# Patient Record
Sex: MALE | Race: WHITE | NOT HISPANIC OR LATINO | Employment: FULL TIME | URBAN - METROPOLITAN AREA
[De-identification: names, ages, dates, MRNs, and addresses within clinical notes are randomized per-mention and may not be internally consistent; named-entity substitution may affect disease eponyms.]

---

## 2017-09-15 ENCOUNTER — HOSPITAL ENCOUNTER (OUTPATIENT)
Dept: SLEEP MEDICINE | Facility: HOSPITAL | Age: 42
Discharge: HOME | End: 2017-09-15
Admitting: NURSE PRACTITIONER

## 2017-09-15 PROCEDURE — G0463 HOSPITAL OUTPT CLINIC VISIT: HCPCS

## 2017-09-19 NOTE — PROGRESS NOTES
[facility]  Telephone: [phone] / Fax: [phone] [place]  Telephone: [phone] / Fax: [phone] Azeb Villagran    PCP: Lei Gonsalez MD    Reason for visit: LEN f/u    Colby Duenas was last seen at [facility] sleep lab 1 year ago.  He uses the CPAP device and benefits from its use in terms of reduction of hypersomnia and snoring.  His mask fits well.  He reports worsening allergic rhinitis type symptoms that limiting CPAP use at times.  He is using Allegra and fluticasone nasal spray to alleviate the symptoms.  His ESS is 7 today.  He uses over the nose mask.  It fits well.  He denies excessive air leak.  No dry mouth.  He last changed his mask 3 months ago.  In the interval he was found to have paroxysmal supraventricular tachycardia and has followed with cardiology.    Social history, chewing tobacco, drinks 6+ of alcohol every week, one soda a day, no energy drinks.    Review of systems unremarkable    Current Medications:  Hycosamine  Clonazepam  Metoprolol  Lexapro  Allegra      Patient  has no past medical history on file.    I have reviewed the Past Medical History, Past Surgical History, Social History and Family History.             Vital Signs:  Height 68 inches, weight 184 pounds, BMI 27, blood pressure 113/73, heart rate 82           General: Alert. Cooperative. Well developed. No acute distress.           Throat: No oral lesions. No thrush. Moist mucous membranes.           Pharynx- Class III Mallampati airway, large tongue, no evidence of redundant lateral pharyngeal tissue             Head:  Normocephalic. Symmetrical. Atraumatic.              Nose: No septal deviation. No drainage.            Chest Wall -Normal shape. Symmetric expansion with respiration. No tenderness.             Neck:  Trachea midline.           Lungs:  Clear to auscultation bilaterally. No wheezes. No rhonchi. No rales. Respirations regular, even and unlabored.            Heart:  Regular  rhythm and normal rate. Normal S1 and S2. No murmur.     Abdomen:  Soft, non-tender and non-distended. Normal bowel sounds. No masses.  Extremities:  Moves all extremities well. No edema.    Psychiatric: Normal mood and affect.    Testing:  · Download 6/17/17-9/14/17 average use of 5 hours and 53 minutes on auto CPAP 5-15 centimeters with average pressure of 12.5, residual AHI 2.8    · HST 7/1/16-overall apnea hypopneas index is 5.7 indicating mild obstructive sleep apnea.  Snoring present for 59%. No significant desaturation with sleep.         Impression:  1. Obstructive sleep apnea.   2. Allergic rhinitis.  3. Recent PSVT      Plan:  Patient uses the CPAP device and benefits from its use in terms of reduction of hypersomnia and snoring. I asked him to increase compliance for full benefit.  As allergic rhinitis symptoms have increased, he was asked to see an allergist.  He may benefit from allergy shots.  Compliance with CPAP was reemphasized especially with underlying recent diagnosis of paroxysmal supraventricular tachycardia.    Weight loss will be strongly beneficial to reduce the severity of sleep-disordered breathing.  Caution during activities that require prolonged concentration is strongly advised if sleepiness returns. Changing of PAP supplies regularly is important for effective use. Patient needs to change cushion on the mask or plugs on nasal pillows along with disposable filters once every month and change mask frame, tubing, headgear and Velcro straps every 6 months at the minimum.       Follow up with Ilene YOUNGBLOOD in 6 months.    Thank you for allowing me to participate in your patient's care.      FORD Ramos  Dictating for Dr Pacheco  [facility]  Phone: [phone]      Part of this note may be an electronic transcription/translation of spoken language to printed text using the Dragon Dictation System.

## 2018-03-22 ENCOUNTER — APPOINTMENT (OUTPATIENT)
Dept: SLEEP MEDICINE | Facility: HOSPITAL | Age: 43
End: 2018-03-22

## 2021-04-02 ENCOUNTER — BULK ORDERING (OUTPATIENT)
Dept: CASE MANAGEMENT | Facility: OTHER | Age: 46
End: 2021-04-02

## 2021-04-02 DIAGNOSIS — Z23 IMMUNIZATION DUE: ICD-10-CM

## 2021-06-15 ENCOUNTER — OFFICE VISIT (OUTPATIENT)
Dept: FAMILY MEDICINE CLINIC | Facility: CLINIC | Age: 46
End: 2021-06-15

## 2021-06-15 VITALS
HEIGHT: 68 IN | HEART RATE: 62 BPM | WEIGHT: 189.6 LBS | TEMPERATURE: 97.5 F | BODY MASS INDEX: 28.73 KG/M2 | OXYGEN SATURATION: 98 % | RESPIRATION RATE: 16 BRPM | DIASTOLIC BLOOD PRESSURE: 68 MMHG | SYSTOLIC BLOOD PRESSURE: 110 MMHG

## 2021-06-15 DIAGNOSIS — G43.109 MIGRAINE WITH AURA AND WITHOUT STATUS MIGRAINOSUS, NOT INTRACTABLE: Primary | ICD-10-CM

## 2021-06-15 DIAGNOSIS — I70.1 RENAL ARTERY ATHEROSCLEROSIS (HCC): ICD-10-CM

## 2021-06-15 DIAGNOSIS — E55.9 VITAMIN D DEFICIENCY: ICD-10-CM

## 2021-06-15 DIAGNOSIS — J30.9 CHRONIC ALLERGIC RHINITIS: ICD-10-CM

## 2021-06-15 DIAGNOSIS — R55 VASOVAGAL SYNCOPE: ICD-10-CM

## 2021-06-15 DIAGNOSIS — F43.10 PTSD (POST-TRAUMATIC STRESS DISORDER): ICD-10-CM

## 2021-06-15 DIAGNOSIS — Z87.820 HISTORY OF MULTIPLE CONCUSSIONS: ICD-10-CM

## 2021-06-15 DIAGNOSIS — K58.0 IRRITABLE BOWEL SYNDROME WITH DIARRHEA: ICD-10-CM

## 2021-06-15 PROBLEM — G47.8 SLEEP PARALYSIS: Status: ACTIVE | Noted: 2020-10-08

## 2021-06-15 PROBLEM — Z45.09 ENCOUNTER FOR LOOP RECORDER AT END OF BATTERY LIFE: Status: ACTIVE | Noted: 2020-09-30

## 2021-06-15 PROBLEM — R19.7 DIARRHEA: Status: ACTIVE | Noted: 2019-01-11

## 2021-06-15 PROBLEM — G47.19 EXCESSIVE DAYTIME SLEEPINESS: Status: ACTIVE | Noted: 2020-10-08

## 2021-06-15 PROCEDURE — 99204 OFFICE O/P NEW MOD 45 MIN: CPT | Performed by: PHYSICIAN ASSISTANT

## 2021-06-15 RX ORDER — ELETRIPTAN HYDROBROMIDE 20 MG/1
TABLET, FILM COATED ORAL
COMMUNITY
Start: 2021-05-06 | End: 2021-06-15 | Stop reason: SDUPTHER

## 2021-06-15 RX ORDER — PANTOPRAZOLE SODIUM 20 MG/1
TABLET, DELAYED RELEASE ORAL
COMMUNITY
Start: 2021-03-14 | End: 2021-06-15 | Stop reason: SDUPTHER

## 2021-06-15 RX ORDER — ERENUMAB-AOOE 140 MG/ML
1 INJECTION, SOLUTION SUBCUTANEOUS
Qty: 1.12 ML | Refills: 11 | Status: SHIPPED | OUTPATIENT
Start: 2021-06-15 | End: 2022-06-28 | Stop reason: SDUPTHER

## 2021-06-15 RX ORDER — PANTOPRAZOLE SODIUM 20 MG/1
20 TABLET, DELAYED RELEASE ORAL DAILY
Qty: 90 TABLET | Refills: 3 | Status: SHIPPED | OUTPATIENT
Start: 2021-06-15 | End: 2022-07-13 | Stop reason: SDUPTHER

## 2021-06-15 RX ORDER — DICYCLOMINE HCL 20 MG
TABLET ORAL
COMMUNITY
Start: 2021-01-27 | End: 2021-07-13 | Stop reason: SDUPTHER

## 2021-06-15 RX ORDER — MONTELUKAST SODIUM 10 MG/1
TABLET ORAL
COMMUNITY
Start: 2021-06-06 | End: 2021-07-13 | Stop reason: SDUPTHER

## 2021-06-15 RX ORDER — METOPROLOL SUCCINATE 50 MG/1
50 TABLET, EXTENDED RELEASE ORAL DAILY
Qty: 90 TABLET | Refills: 1 | Status: SHIPPED | OUTPATIENT
Start: 2021-06-15 | End: 2021-07-13 | Stop reason: SDUPTHER

## 2021-06-15 RX ORDER — GABAPENTIN 300 MG/1
300 CAPSULE ORAL 3 TIMES DAILY
COMMUNITY
End: 2022-10-05 | Stop reason: SDDI

## 2021-06-15 RX ORDER — ELETRIPTAN HYDROBROMIDE 20 MG/1
20 TABLET, FILM COATED ORAL ONCE AS NEEDED
Qty: 15 TABLET | Refills: 11 | Status: SHIPPED | OUTPATIENT
Start: 2021-06-15 | End: 2022-07-13 | Stop reason: SDUPTHER

## 2021-06-15 NOTE — PROGRESS NOTES
Subjective   Colby Duenas is a 46 y.o. male.     History of Present Illness       Colby Duenas 46 y.o. male who presents today for a new patient appointment.    he has a history of   Patient Active Problem List   Diagnosis   • Change in blood platelet count   • Seizure disorder (CMS/HCC)   • Idiopathic insomnia   • Cutaneous eruption   • Acute post-traumatic neurosis   • LEN (obstructive sleep apnea)   • BP (high blood pressure)   • Fatigue   • Chronic mixed headache syndrome   • Allergic rhinitis   • Benign essential hypertension   • Diarrhea   • Encounter for loop recorder at end of battery life   • Excessive daytime sleepiness   • History of multiple concussions   • Irritable bowel syndrome with diarrhea   • Impingement syndrome of right shoulder   • S/P bilateral inguinal hernia repair   • Sleep paralysis   • Vasovagal syncope   • PTSD (post-traumatic stress disorder)   • Allergic rhinitis   • LEN on CPAP   • Migraine headache without aura   .  he is here to establish care I reviewed the PFSH recorded today by my MA/LPN staff.   he   He has been feeling tired.  Sees Dr Han for PTSD--psych  Use Cpap/Bipap every night  On beta blocker for hx sinus tachycardia; hx syncope  Loop recorder----released from cardio    IBS Dr. Chowdary---IBS---- fiber therapy and IB Guard---this helps  GERD is controlled on PPI Rx.    Last colonoscopy 3-1-19  Colby Duenas 46 y.o. male presents for evaluation of migraine. Symptoms began about several years ago. Generally, the headaches last about several hours and occur 2 or more times a week. The headaches are usually throbbing. The location of the headache pain is bilateral, occipital, temporal, parietal and retro-orbital. Recently, the headaches have been increasing in both severity and frequency. Work attendance or other daily activities are affected by the headaches. Precipitating factors include: odors, stress, weather changes and lack sleep. The headaches are  usually preceded by an aura consisting of a peculiar odor and visual scintillations. Associated symptoms include nausea, vomiting, headache pain worse with movement, photophobia and phonophobia. The patient denies depression. Home treatment has included NSAIDs, Excedrin, Topamax, Elavil with no improvement. Other history includes: Migraine headache. . Family history includes headaches of unknown type in sister.  Prior therapies tried include triptans with relief some help, but still symptoms.    Saw Dr Cordova in past  1-6-17  IMPRESSION:   1. Minimal intimal thickening of the bilateral carotid system with no   hemodynamically significant stenosis.   2. Unremarkable vertebral and subclavian arterial flow.    Renal artery doppler 12-30-16  PROCEDURE PERFORMED: DUPLEX US RENAL ARTERY     Conclusions: Renal-aorta ratios are 0.62  (right) and 0.62  (left) consistent with <60% bilateral renal artery stenosis. Resistive indices within normal limits bilaterally. Kidney lengths measure 10.3  cm (right) and 10.3  cm (left). Bilateral renal   veins are patent. Incidental finding, echogenicity in region of the gallbladder. Recommend clinical correlation and further imaging if warranted.No prior examination for comparison.     Echo stress test 12-23-16  Conclusions:   PDP 90560   Global left ventricular wall motion and contractility are within normal   limits.   The EF 4ch was calculated at 58%.   Normal left ventricular diastolic filling is observed.   Definity was used to optimize study.   Normal Sress Echo   Normal Stress EKG      The following portions of the patient's history were reviewed and updated as appropriate: allergies, current medications, past family history, past medical history, past social history, past surgical history and problem list.    Review of Systems   Constitutional: Negative for activity change, appetite change and unexpected weight change.   HENT: Negative for nosebleeds and trouble swallowing.    Eyes:  Negative for pain and visual disturbance.   Respiratory: Negative for chest tightness, shortness of breath and wheezing.    Cardiovascular: Negative for chest pain and palpitations.   Gastrointestinal: Negative for abdominal pain and blood in stool.   Endocrine: Negative.    Genitourinary: Negative for difficulty urinating and hematuria.   Musculoskeletal: Negative for joint swelling.   Skin: Negative for color change and rash.   Allergic/Immunologic: Negative.    Neurological: Negative for syncope and speech difficulty.   Hematological: Negative for adenopathy.   Psychiatric/Behavioral: Negative for agitation and confusion.   All other systems reviewed and are negative.      Objective   Physical Exam  Vitals and nursing note reviewed.   Constitutional:       General: He is not in acute distress.     Appearance: Normal appearance. He is well-developed. He is not diaphoretic.   HENT:      Head: Normocephalic.      Right Ear: External ear normal.      Left Ear: External ear normal.   Eyes:      General: No scleral icterus.     Conjunctiva/sclera: Conjunctivae normal.      Pupils: Pupils are equal, round, and reactive to light.   Neck:      Vascular: No carotid bruit.   Cardiovascular:      Rate and Rhythm: Normal rate and regular rhythm.      Heart sounds: Normal heart sounds. No murmur heard.     Pulmonary:      Effort: Pulmonary effort is normal.      Breath sounds: Normal breath sounds. No rales.   Abdominal:      Tenderness: There is no abdominal tenderness.   Musculoskeletal:         General: Normal range of motion.      Cervical back: Normal range of motion and neck supple.      Right lower leg: No edema.      Left lower leg: No edema.   Lymphadenopathy:      Cervical: No cervical adenopathy.   Skin:     General: Skin is warm and dry.      Coloration: Skin is not jaundiced.      Findings: No rash.   Neurological:      General: No focal deficit present.      Mental Status: He is alert and oriented to person,  place, and time.   Psychiatric:         Mood and Affect: Affect is not inappropriate.         Behavior: Behavior normal.         Thought Content: Thought content normal.         Judgment: Judgment normal.         Assessment/Plan   Diagnoses and all orders for this visit:    1. Migraine with aura and without status migrainosus, not intractable (Primary)  Comments:  more severe and frequent  Orders:  -     Comprehensive metabolic panel  -     Lipid panel  -     CBC and Differential  -     TSH  -     T3, Free  -     T4, Free  -     Vitamin B12  -     Folate  -     Vitamin D 25 Hydroxy  -     Hemoglobin A1c  -     MRI Brain With & Without Contrast; Future  -     Duplex Renal Artery - Bilateral Complete CAR; Future    2. Chronic allergic rhinitis  -     Comprehensive metabolic panel  -     Lipid panel  -     CBC and Differential  -     TSH  -     T3, Free  -     T4, Free  -     Vitamin B12  -     Folate  -     Vitamin D 25 Hydroxy  -     Hemoglobin A1c  -     MRI Brain With & Without Contrast; Future  -     Duplex Renal Artery - Bilateral Complete CAR; Future    3. PTSD (post-traumatic stress disorder)  -     Comprehensive metabolic panel  -     Lipid panel  -     CBC and Differential  -     TSH  -     T3, Free  -     T4, Free  -     Vitamin B12  -     Folate  -     Vitamin D 25 Hydroxy  -     Hemoglobin A1c  -     MRI Brain With & Without Contrast; Future  -     Duplex Renal Artery - Bilateral Complete CAR; Future    4. History of multiple concussions  -     Comprehensive metabolic panel  -     Lipid panel  -     CBC and Differential  -     TSH  -     T3, Free  -     T4, Free  -     Vitamin B12  -     Folate  -     Vitamin D 25 Hydroxy  -     Hemoglobin A1c  -     MRI Brain With & Without Contrast; Future  -     Duplex Renal Artery - Bilateral Complete CAR; Future    5. Vasovagal syncope  -     Comprehensive metabolic panel  -     Lipid panel  -     CBC and Differential  -     TSH  -     T3, Free  -     T4, Free  -      Vitamin B12  -     Folate  -     Vitamin D 25 Hydroxy  -     Hemoglobin A1c  -     MRI Brain With & Without Contrast; Future  -     Duplex Renal Artery - Bilateral Complete CAR; Future    6. Irritable bowel syndrome with diarrhea  -     Comprehensive metabolic panel  -     Lipid panel  -     CBC and Differential  -     TSH  -     T3, Free  -     T4, Free  -     Vitamin B12  -     Folate  -     Vitamin D 25 Hydroxy  -     Hemoglobin A1c  -     MRI Brain With & Without Contrast; Future  -     Duplex Renal Artery - Bilateral Complete CAR; Future    7. Vitamin D deficiency  -     Comprehensive metabolic panel  -     Lipid panel  -     CBC and Differential  -     TSH  -     T3, Free  -     T4, Free  -     Vitamin B12  -     Folate  -     Vitamin D 25 Hydroxy  -     Hemoglobin A1c  -     MRI Brain With & Without Contrast; Future  -     Duplex Renal Artery - Bilateral Complete CAR; Future    8. Renal artery atherosclerosis (CMS/HCC)  -     Comprehensive metabolic panel  -     Lipid panel  -     CBC and Differential  -     TSH  -     T3, Free  -     T4, Free  -     Vitamin B12  -     Folate  -     Vitamin D 25 Hydroxy  -     Hemoglobin A1c  -     MRI Brain With & Without Contrast; Future  -     Duplex Renal Artery - Bilateral Complete CAR; Future    Other orders  -     pantoprazole (PROTONIX) 20 MG EC tablet; Take 1 tablet by mouth Daily. For GERD  Dispense: 90 tablet; Refill: 3  -     metoprolol succinate XL (TOPROL-XL) 50 MG 24 hr tablet; Take 1 tablet by mouth Daily. For heart rate  Dispense: 90 tablet; Refill: 1  -     eletriptan (RELPAX) 20 MG tablet; Take 1 tablet by mouth 1 (One) Time As Needed for Migraine for up to 1 dose. may repeat in 2 hours if necessary  Dispense: 15 tablet; Refill: 11  -     Erenumab-aooe (Aimovig) 140 MG/ML prefilled syringe; Inject 1 mL under the skin into the appropriate area as directed Every 30 (Thirty) Days. For migraines  Dispense: 1.12 mL; Refill: 11      Failed several meds for  migrain--restart Aimovig and keep Relpax PRN migraines  Talk to DR Han about sleep  Update brain imaging---hx concussions; migraines worse again; restart Aimovig and keep Relpax PRN  Ok Bentyl PRN IBS and PPI for GERD  Use Cpap/Bipap every night  Renal doppler---2016 plaque?

## 2021-06-16 LAB
25(OH)D3+25(OH)D2 SERPL-MCNC: 21.4 NG/ML (ref 30–100)
ALBUMIN SERPL-MCNC: 5.3 G/DL (ref 3.5–5.2)
ALBUMIN/GLOB SERPL: 2 G/DL
ALP SERPL-CCNC: 87 U/L (ref 39–117)
ALT SERPL-CCNC: 25 U/L (ref 1–41)
AST SERPL-CCNC: 20 U/L (ref 1–40)
BASOPHILS # BLD AUTO: ABNORMAL 10*3/UL
BILIRUB SERPL-MCNC: 0.7 MG/DL (ref 0–1.2)
BUN SERPL-MCNC: 16 MG/DL (ref 6–20)
BUN/CREAT SERPL: 15.8 (ref 7–25)
CALCIUM SERPL-MCNC: 10.2 MG/DL (ref 8.6–10.5)
CHLORIDE SERPL-SCNC: 100 MMOL/L (ref 98–107)
CHOLEST SERPL-MCNC: 216 MG/DL (ref 0–200)
CO2 SERPL-SCNC: 28.8 MMOL/L (ref 22–29)
CREAT SERPL-MCNC: 1.01 MG/DL (ref 0.76–1.27)
DIFFERENTIAL COMMENT: NORMAL
EOSINOPHIL # BLD AUTO: ABNORMAL 10*3/UL
EOSINOPHIL # BLD MANUAL: 0.14 10*3/MM3 (ref 0–0.4)
EOSINOPHIL NFR BLD AUTO: ABNORMAL %
EOSINOPHIL NFR BLD MANUAL: 2 % (ref 0.3–6.2)
ERYTHROCYTE [DISTWIDTH] IN BLOOD BY AUTOMATED COUNT: 12.8 % (ref 12.3–15.4)
FOLATE SERPL-MCNC: 9.5 NG/ML (ref 4.78–24.2)
GLOBULIN SER CALC-MCNC: 2.7 GM/DL
GLUCOSE SERPL-MCNC: 87 MG/DL (ref 65–99)
HBA1C MFR BLD: 5.2 % (ref 4.8–5.6)
HCT VFR BLD AUTO: 48.8 % (ref 37.5–51)
HDLC SERPL-MCNC: 58 MG/DL (ref 40–60)
HGB BLD-MCNC: 16.2 G/DL (ref 13–17.7)
LDLC SERPL CALC-MCNC: 137 MG/DL (ref 0–100)
LYMPHOCYTES # BLD AUTO: ABNORMAL 10*3/UL
LYMPHOCYTES # BLD MANUAL: 2.93 10*3/MM3 (ref 0.7–3.1)
LYMPHOCYTES NFR BLD AUTO: ABNORMAL %
LYMPHOCYTES NFR BLD MANUAL: 41.4 % (ref 19.6–45.3)
MCH RBC QN AUTO: 30.5 PG (ref 26.6–33)
MCHC RBC AUTO-ENTMCNC: 33.2 G/DL (ref 31.5–35.7)
MCV RBC AUTO: 91.7 FL (ref 79–97)
MONOCYTES # BLD MANUAL: 0.36 10*3/MM3 (ref 0.1–0.9)
MONOCYTES NFR BLD AUTO: ABNORMAL %
MONOCYTES NFR BLD MANUAL: 5.1 % (ref 5–12)
NEUTROPHILS # BLD MANUAL: 3.64 10*3/MM3 (ref 1.7–7)
NEUTROPHILS NFR BLD AUTO: ABNORMAL %
NEUTROPHILS NFR BLD MANUAL: 51.5 % (ref 42.7–76)
PLATELET # BLD AUTO: 120 10*3/MM3 (ref 140–450)
PLATELET BLD QL SMEAR: NORMAL
POTASSIUM SERPL-SCNC: 4.8 MMOL/L (ref 3.5–5.2)
PROT SERPL-MCNC: 8 G/DL (ref 6–8.5)
RBC # BLD AUTO: 5.32 10*6/MM3 (ref 4.14–5.8)
RBC MORPH BLD: NORMAL
SODIUM SERPL-SCNC: 140 MMOL/L (ref 136–145)
T3FREE SERPL-MCNC: 3.1 PG/ML (ref 2–4.4)
T4 FREE SERPL-MCNC: 1.17 NG/DL (ref 0.93–1.7)
TRIGL SERPL-MCNC: 120 MG/DL (ref 0–150)
TSH SERPL DL<=0.005 MIU/L-ACNC: 2.04 UIU/ML (ref 0.27–4.2)
VIT B12 SERPL-MCNC: 846 PG/ML (ref 211–946)
VLDLC SERPL CALC-MCNC: 21 MG/DL (ref 5–40)
WBC # BLD AUTO: 7.07 10*3/MM3 (ref 3.4–10.8)

## 2021-07-13 ENCOUNTER — OFFICE VISIT (OUTPATIENT)
Dept: FAMILY MEDICINE CLINIC | Facility: CLINIC | Age: 46
End: 2021-07-13

## 2021-07-13 VITALS
OXYGEN SATURATION: 96 % | TEMPERATURE: 98.5 F | WEIGHT: 191 LBS | RESPIRATION RATE: 16 BRPM | DIASTOLIC BLOOD PRESSURE: 82 MMHG | HEIGHT: 68 IN | BODY MASS INDEX: 28.95 KG/M2 | SYSTOLIC BLOOD PRESSURE: 120 MMHG | HEART RATE: 65 BPM

## 2021-07-13 DIAGNOSIS — K21.9 GASTROESOPHAGEAL REFLUX DISEASE WITHOUT ESOPHAGITIS: ICD-10-CM

## 2021-07-13 DIAGNOSIS — D69.6 THROMBOCYTOPENIA (HCC): ICD-10-CM

## 2021-07-13 DIAGNOSIS — I47.1 PSVT (PAROXYSMAL SUPRAVENTRICULAR TACHYCARDIA) (HCC): ICD-10-CM

## 2021-07-13 DIAGNOSIS — G43.019 INTRACTABLE MIGRAINE WITHOUT AURA AND WITHOUT STATUS MIGRAINOSUS: Primary | ICD-10-CM

## 2021-07-13 DIAGNOSIS — Z99.89 OSA ON CPAP: ICD-10-CM

## 2021-07-13 DIAGNOSIS — E78.2 HYPERLIPIDEMIA, MIXED: ICD-10-CM

## 2021-07-13 DIAGNOSIS — G47.33 OSA ON CPAP: ICD-10-CM

## 2021-07-13 PROCEDURE — 99214 OFFICE O/P EST MOD 30 MIN: CPT | Performed by: PHYSICIAN ASSISTANT

## 2021-07-13 RX ORDER — METOPROLOL SUCCINATE 50 MG/1
50 TABLET, EXTENDED RELEASE ORAL DAILY
Qty: 90 TABLET | Refills: 3 | Status: SHIPPED | OUTPATIENT
Start: 2021-07-13 | End: 2022-07-13 | Stop reason: SDUPTHER

## 2021-07-13 RX ORDER — DICYCLOMINE HCL 20 MG
20 TABLET ORAL 3 TIMES DAILY PRN
Qty: 90 TABLET | Refills: 11 | Status: SHIPPED | OUTPATIENT
Start: 2021-07-13 | End: 2022-07-13 | Stop reason: SDUPTHER

## 2021-07-13 RX ORDER — MONTELUKAST SODIUM 10 MG/1
10 TABLET ORAL NIGHTLY
Qty: 90 TABLET | Refills: 3 | Status: SHIPPED | OUTPATIENT
Start: 2021-07-13 | End: 2022-07-13 | Stop reason: SDUPTHER

## 2021-07-13 NOTE — PATIENT INSTRUCTIONS
Fat and Cholesterol Restricted Eating Plan  Eating a diet that limits fat and cholesterol may help lower your risk for heart disease and other conditions. Your body needs fat and cholesterol for basic functions, but eating too much of these things can be harmful to your health.  Your health care provider may order lab tests to check your blood fat (lipid) and cholesterol levels. This helps your health care provider understand your risk for certain conditions and whether you need to make diet changes. Work with your health care provider or dietitian to make an eating plan that is right for you.  Your plan includes:  · Limit your fat intake to ______% or less of your total calories a day.  · Limit your saturated fat intake to ______% or less of your total calories a day.  · Limit the amount of cholesterol in your diet to less than _________mg a day.  · Eat ___________ g of fiber a day.  What are tips for following this plan?  General guidelines    · If you are overweight, work with your health care provider to lose weight safely. Losing just 5-10% of your body weight can improve your overall health and help prevent diseases such as diabetes and heart disease.  · Avoid:  ? Foods with added sugar.  ? Fried foods.  ? Foods that contain partially hydrogenated oils, including stick margarine, some tub margarines, cookies, crackers, and other baked goods.  · Limit alcohol intake to no more than 1 drink a day for nonpregnant women and 2 drinks a day for men. One drink equals 12 oz of beer, 5 oz of wine, or 1½ oz of hard liquor.  Reading food labels  · Check food labels for:  ? Trans fats, partially hydrogenated oils, or high amounts of saturated fat. Avoid foods that contain saturated fat and trans fat.  ? The amount of cholesterol in each serving. Try to eat no more than 200 mg of cholesterol each day.  ? The amount of fiber in each serving. Try to eat at least 20-30 g of fiber each day.  · Choose foods with healthy fats,  "such as:  ? Monounsaturated and polyunsaturated fats. These include olive and canola oil, flaxseeds, walnuts, almonds, and seeds.  ? Omega-3 fats. These are found in foods such as salmon, mackerel, sardines, tuna, flaxseed oil, and ground flaxseeds.  · Choose grain products that have whole grains. Look for the word \"whole\" as the first word in the ingredient list.  Cooking  · Cook foods using methods other than frying. Baking, boiling, grilling, and broiling are some healthy options.  · Eat more home-cooked food and less restaurant, buffet, and fast food.  · Avoid cooking using saturated fats.  ? Animal sources of saturated fats include meats, butter, and cream.  ? Plant sources of saturated fats include palm oil, palm kernel oil, and coconut oil.  Meal planning    · At meals, imagine dividing your plate into fourths:  ? Fill one-half of your plate with vegetables and green salads.  ? Fill one-fourth of your plate with whole grains.  ? Fill one-fourth of your plate with lean protein foods.  · Eat fish that is high in omega-3 fats at least two times a week.  · Eat more foods that contain fiber, such as whole grains, beans, apples, broccoli, carrots, peas, and barley. These foods help promote healthy cholesterol levels in the blood.  Recommended foods  Grains  · Whole grains, such as whole wheat or whole grain breads, crackers, cereals, and pasta. Unsweetened oatmeal, bulgur, barley, quinoa, or brown rice. Corn or whole wheat flour tortillas.  Vegetables  · Fresh or frozen vegetables (raw, steamed, roasted, or grilled). Green salads.  Fruits  · All fresh, canned (in natural juice), or frozen fruits.  Meats and other protein foods  · Ground beef (85% or leaner), grass-fed beef, or beef trimmed of fat. Skinless chicken or turkey. Ground chicken or turkey. Pork trimmed of fat. All fish and seafood. Egg whites. Dried beans, peas, or lentils. Unsalted nuts or seeds. Unsalted canned beans. Natural nut butters without added " sugar and oil.  Dairy  · Low-fat or nonfat dairy products, such as skim or 1% milk, 2% or reduced-fat cheeses, low-fat and fat-free ricotta or cottage cheese, or plain low-fat and nonfat yogurt.  Fats and oils  · Tub margarine without trans fats. Light or reduced-fat mayonnaise and salad dressings. Avocado. Olive, canola, sesame, or safflower oils.  The items listed above may not be a complete list of recommended foods or beverages. Contact your dietitian for more options.  Foods to avoid  Grains  · White bread. White pasta. White rice. Cornbread. Bagels, pastries, and croissants. Crackers and snack foods that contain trans fat and hydrogenated oils.  Vegetables  · Vegetables cooked in cheese, cream, or butter sauce. Fried vegetables.  Fruits  · Canned fruit in heavy syrup. Fruit in cream or butter sauce. Fried fruit.  Meats and other protein foods  · Fatty cuts of meat. Ribs, chicken wings, suero, sausage, bologna, salami, chitterlings, fatback, hot dogs, bratwurst, and packaged lunch meats. Liver and organ meats. Whole eggs and egg yolks. Chicken and turkey with skin. Fried meat.  Dairy  · Whole or 2% milk, cream, half-and-half, and cream cheese. Whole milk cheeses. Whole-fat or sweetened yogurt. Full-fat cheeses. Nondairy creamers and whipped toppings. Processed cheese, cheese spreads, and cheese curds.  Beverages  · Alcohol. Sugar-sweetened drinks such as sodas, lemonade, and fruit drinks.  Fats and oils  · Butter, stick margarine, lard, shortening, ghee, or suero fat. Coconut, palm kernel, and palm oils.  Sweets and desserts  · Corn syrup, sugars, honey, and molasses. Candy. Jam and jelly. Syrup. Sweetened cereals. Cookies, pies, cakes, donuts, muffins, and ice cream.  The items listed above may not be a complete list of foods and beverages to avoid. Contact your dietitian for more information.  Summary  · Your body needs fat and cholesterol for basic functions. However, eating too much of these things can be  harmful to your health.  · Work with your health care provider and dietitian to follow a diet low in fat and cholesterol. Doing this may help lower your risk for heart disease and other conditions.  · Choose healthy fats, such as monounsaturated and polyunsaturated fats, and foods high in omega-3 fatty acids.  · Eat fiber-rich foods, such as whole grains, beans, peas, fruits, and vegetables.  · Limit or avoid alcohol, fried foods, and foods high in saturated fats, partially hydrogenated oils, and sugar.  This information is not intended to replace advice given to you by your health care provider. Make sure you discuss any questions you have with your health care provider.  Document Revised: 11/30/2018 Document Reviewed: 09/04/2018  [employer]vier Patient Education © 2021 [employer]

## 2021-07-13 NOTE — PROGRESS NOTES
"Subjective   Colby Duenas is a 46 y.o. male.     History of Present Illness    Since the last visit, he has overall felt tired.  He has GERD controlled on PPI Rx, Hyperlipidemia and working on this with diet and exercise, Seasonal allergies and doing well on their medication , Vitamin D deficiency and will update labs for continued management, Migraine headaches and responding well to PRN triptan Rx and Migraine headaches and well controlled on suppression medication.  he has been compliant with current medications have reviewed them.  The patient denies medication side effects.  Will refill medications. /82   Pulse 65   Temp 98.5 °F (36.9 °C)   Resp 16   Ht 172.7 cm (67.99\")   Wt 86.6 kg (191 lb)   SpO2 96%   BMI 29.05 kg/m²   GERD Rx is daily---failed Zantac;  Bentyl for IBS works great PRN.  2013 AHA (American Heart Association) Cholesterol Risk Ratio Score Goal is <=7.5% and your score is:  2.15%  Exercise daily--physical job    Results for orders placed or performed in visit on 06/15/21   Comprehensive metabolic panel    Specimen: Blood   Result Value Ref Range    Glucose 87 65 - 99 mg/dL    BUN 16 6 - 20 mg/dL    Creatinine 1.01 0.76 - 1.27 mg/dL    eGFR Non African Am 79 >60 mL/min/1.73    eGFR African Am 96 >60 mL/min/1.73    BUN/Creatinine Ratio 15.8 7.0 - 25.0    Sodium 140 136 - 145 mmol/L    Potassium 4.8 3.5 - 5.2 mmol/L    Chloride 100 98 - 107 mmol/L    Total CO2 28.8 22.0 - 29.0 mmol/L    Calcium 10.2 8.6 - 10.5 mg/dL    Total Protein 8.0 6.0 - 8.5 g/dL    Albumin 5.30 (H) 3.50 - 5.20 g/dL    Globulin 2.7 gm/dL    A/G Ratio 2.0 g/dL    Total Bilirubin 0.7 0.0 - 1.2 mg/dL    Alkaline Phosphatase 87 39 - 117 U/L    AST (SGOT) 20 1 - 40 U/L    ALT (SGPT) 25 1 - 41 U/L   Lipid panel    Specimen: Blood   Result Value Ref Range    Total Cholesterol 216 (H) 0 - 200 mg/dL    Triglycerides 120 0 - 150 mg/dL    HDL Cholesterol 58 40 - 60 mg/dL    VLDL Cholesterol Jose Armando 21 5 - 40 mg/dL    LDL " Chol Calc (NIH) 137 (H) 0 - 100 mg/dL   TSH    Specimen: Blood   Result Value Ref Range    TSH 2.040 0.270 - 4.200 uIU/mL   T3, Free    Specimen: Blood   Result Value Ref Range    T3, Free 3.1 2.0 - 4.4 pg/mL   T4, Free    Specimen: Blood   Result Value Ref Range    Free T4 1.17 0.93 - 1.70 ng/dL   Vitamin B12    Specimen: Blood   Result Value Ref Range    Vitamin B-12 846 211 - 946 pg/mL   Folate    Specimen: Blood   Result Value Ref Range    Folate 9.50 4.78 - 24.20 ng/mL   Vitamin D 25 Hydroxy    Specimen: Blood   Result Value Ref Range    25 Hydroxy, Vitamin D 21.4 (L) 30.0 - 100.0 ng/mL   Hemoglobin A1c    Specimen: Blood   Result Value Ref Range    Hemoglobin A1C 5.20 4.80 - 5.60 %   Manual Differential   Result Value Ref Range    Neutrophil Rel % 51.5 42.7 - 76.0 %    Lymphocyte Rel % 41.4 19.6 - 45.3 %    Monocyte Rel % 5.1 5.0 - 12.0 %    Eosinophil Rel % 2.0 0.3 - 6.2 %    Neutrophils Absolute 3.64 1.70 - 7.00 10*3/mm3    Lymphocytes Absolute 2.93 0.70 - 3.10 10*3/mm3    Monocytes Absolute 0.36 0.10 - 0.90 10*3/mm3    Eosinophil Abs 0.14 0.00 - 0.40 10*3/mm3    Differential Comment Comment     Comment Comment     Plt Comment Comment    CBC and Differential    Specimen: Blood   Result Value Ref Range    WBC 7.07 3.40 - 10.80 10*3/mm3    RBC 5.32 4.14 - 5.80 10*6/mm3    Hemoglobin 16.2 13.0 - 17.7 g/dL    Hematocrit 48.8 37.5 - 51.0 %    MCV 91.7 79.0 - 97.0 fL    MCH 30.5 26.6 - 33.0 pg    MCHC 33.2 31.5 - 35.7 g/dL    RDW 12.8 12.3 - 15.4 %    Platelets 120 (L) 140 - 450 10*3/mm3    Neutrophil Rel % CANCELED     Lymphocyte Rel % CANCELED     Monocyte Rel % CANCELED     Eosinophil Rel % CANCELED     Lymphocytes Absolute CANCELED     Eosinophils Absolute CANCELED     Basophils Absolute CANCELED      Prior notes: ---  On beta blocker for hx sinus tachycardia; hx syncope  Loop recorder----released from cardio-----DR Moreno   Echo stress test 12-23-16  Conclusions:   Global left ventricular wall motion and  contractility are within normal   limits.   The EF 4ch was calculated at 58%.   Normal left ventricular diastolic filling is observed.   Definity was used to optimize study.   Normal Sress Echo   Normal Stress EKG      Has been to neurology in the past and saw Dr. Cordova for migraines and hx concussions.  Last visit he reported that he was not taking his prevention medicine due to insurance.  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  Also noted prior renal artery ultrasound stating less than 60% bilateral renal artery stenosis and ordered follow-up Doppler and this was completed in normal.    Sees Dr Han for psych    The following portions of the patient's history were reviewed and updated as appropriate: allergies, current medications, past family history, past medical history, past social history, past surgical history and problem list.    Review of Systems   Constitutional: Positive for appetite change and fatigue. Negative for activity change and unexpected weight change.   HENT: Negative for nosebleeds and trouble swallowing.    Eyes: Negative for pain and visual disturbance.   Respiratory: Negative for chest tightness, shortness of breath and wheezing.    Cardiovascular: Negative for chest pain and palpitations.   Gastrointestinal: Negative for abdominal pain and blood in stool.   Endocrine: Negative.    Genitourinary: Negative for difficulty urinating and hematuria.   Musculoskeletal: Positive for arthralgias. Negative for joint swelling.   Skin: Negative for color change and rash.   Allergic/Immunologic: Positive for environmental allergies.   Neurological: Negative for syncope and speech difficulty.   Hematological: Negative for adenopathy.   Psychiatric/Behavioral: Positive  for decreased concentration and sleep disturbance. Negative for agitation and confusion. The patient is nervous/anxious.    All other systems reviewed and are negative.      Objective   Physical Exam  Vitals and nursing note reviewed.   Constitutional:       General: He is not in acute distress.     Appearance: Normal appearance. He is well-developed. He is not diaphoretic.   HENT:      Head: Normocephalic.   Eyes:      General: No scleral icterus.     Conjunctiva/sclera: Conjunctivae normal.      Pupils: Pupils are equal, round, and reactive to light.   Cardiovascular:      Rate and Rhythm: Normal rate and regular rhythm.      Pulses: Normal pulses.      Heart sounds: Normal heart sounds. No murmur heard.     Pulmonary:      Effort: Pulmonary effort is normal.      Breath sounds: Normal breath sounds. No rales.   Musculoskeletal:         General: Normal range of motion.      Cervical back: Normal range of motion and neck supple.   Skin:     General: Skin is warm and dry.      Findings: No rash.   Neurological:      Mental Status: He is alert and oriented to person, place, and time. Mental status is at baseline.   Psychiatric:         Mood and Affect: Affect is not inappropriate.         Behavior: Behavior normal.         Thought Content: Thought content normal.         Judgment: Judgment normal.         Assessment/Plan   Diagnoses and all orders for this visit:    1. Intractable migraine without aura and without status migrainosus (Primary)    2. LEN on CPAP    3. PSVT (paroxysmal supraventricular tachycardia) (CMS/HCC)    4. Hyperlipidemia, mixed    5. Thrombocytopenia (CMS/HCC)  -     Platelet Ct On Citrated Bld    6. Gastroesophageal reflux disease without esophagitis    Other orders  -     montelukast (SINGULAIR) 10 MG tablet; Take 1 tablet by mouth Every Night.  Dispense: 90 tablet; Refill: 3  -     metoprolol succinate XL (TOPROL-XL) 50 MG 24 hr tablet; Take 1 tablet by mouth Daily. For heart rate  Dispense:  90 tablet; Refill: 3  -     dicyclomine (BENTYL) 20 MG tablet; Take 1 tablet by mouth 3 (Three) Times a Day As Needed (IBS).  Dispense: 90 tablet; Refill: 11      MRI brain as above---no further work up, renal doppler was normal ---no further work up   Aimovig working--suppresses migraines  Sees Dr Han, psych  Cont beta blocker for heart rate control  Need citrated platelet count---abn lab  Failed H2 blocker  Bentyl for IBS works great PRN.  Plan, Colby Duenas, was seen today.  he was seen for GERD and will continue on PPI medication, Hyperlipidemia and will work on this with diet and exercise, Hypothyroidism and under the care of Endocrinologist, Vitamin D deficiency and will update labs , Migraine headaches and will continue with their PRN triptan and Migraine headaches and well controlled on their suppressive medication.

## 2021-09-09 ENCOUNTER — OFFICE VISIT (OUTPATIENT)
Dept: FAMILY MEDICINE CLINIC | Facility: CLINIC | Age: 46
End: 2021-09-09

## 2021-09-09 VITALS
DIASTOLIC BLOOD PRESSURE: 62 MMHG | SYSTOLIC BLOOD PRESSURE: 115 MMHG | RESPIRATION RATE: 16 BRPM | TEMPERATURE: 97.5 F | WEIGHT: 194 LBS | BODY MASS INDEX: 29.4 KG/M2 | HEIGHT: 68 IN | OXYGEN SATURATION: 96 % | HEART RATE: 66 BPM

## 2021-09-09 DIAGNOSIS — M25.511 ACUTE PAIN OF RIGHT SHOULDER: Primary | ICD-10-CM

## 2021-09-09 PROCEDURE — 99213 OFFICE O/P EST LOW 20 MIN: CPT | Performed by: PHYSICIAN ASSISTANT

## 2021-09-09 RX ORDER — LORAZEPAM 1 MG/1
TABLET ORAL
Qty: 2 TABLET | Refills: 0 | Status: SHIPPED | OUTPATIENT
Start: 2021-09-09 | End: 2021-10-05 | Stop reason: SDUPTHER

## 2021-09-09 NOTE — PATIENT INSTRUCTIONS
Shoulder Pain  Many things can cause shoulder pain, including:  · An injury to the shoulder.  · Overuse of the shoulder.  · Arthritis.  The source of the pain can be:  · Inflammation.  · An injury to the shoulder joint.  · An injury to a tendon, ligament, or bone.  Follow these instructions at home:  Pay attention to changes in your symptoms. Let your health care provider know about them. Follow these instructions to relieve your pain.  If you have a sling:  · Wear the sling as told by your health care provider. Remove it only as told by your health care provider.  · Loosen the sling if your fingers tingle, become numb, or turn cold and blue.  · Keep the sling clean.  · If the sling is not waterproof:  ? Do not let it get wet. Remove it to shower or bathe.  · Move your arm as little as possible, but keep your hand moving to prevent swelling.  Managing pain, stiffness, and swelling    · If directed, put ice on the painful area:  ? Put ice in a plastic bag.  ? Place a towel between your skin and the bag.  ? Leave the ice on for 20 minutes, 2-3 times per day. Stop applying ice if it does not help with the pain.  · Squeeze a soft ball or a foam pad as much as possible. This helps to keep the shoulder from swelling. It also helps to strengthen the arm.    General instructions  · Take over-the-counter and prescription medicines only as told by your health care provider.  · Keep all follow-up visits as told by your health care provider. This is important.  Contact a health care provider if:  · Your pain gets worse.  · Your pain is not relieved with medicines.  · New pain develops in your arm, hand, or fingers.  Get help right away if:  · Your arm, hand, or fingers:  ? Tingle.  ? Become numb.  ? Become swollen.  ? Become painful.  ? Turn white or blue.  Summary  · Shoulder pain can be caused by an injury, overuse, or arthritis.  · Pay attention to changes in your symptoms. Let your health care provider know about  them.  · This condition may be treated with a sling, ice, and pain medicines.  · Contact your health care provider if the pain gets worse or new pain develops. Get help right away if your arm, hand, or fingers tingle or become numb, swollen, or painful.  · Keep all follow-up visits as told by your health care provider. This is important.  This information is not intended to replace advice given to you by your health care provider. Make sure you discuss any questions you have with your health care provider.  Document Revised: 07/02/2019 Document Reviewed: 07/02/2019  Elsevier Patient Education © 2021 [employer]

## 2021-09-09 NOTE — PROGRESS NOTES
"Subjective   Colby Duenas is a 46 y.o. male.     History of Present Illness   Colby Duenas 46 y.o. male /62 (BP Location: Left arm, Patient Position: Sitting, Cuff Size: Adult)   Pulse 66   Temp 97.5 °F (36.4 °C)   Resp 16   Ht 172.7 cm (67.99\")   Wt 88 kg (194 lb)   SpO2 96%   BMI 29.50 kg/m²  who presents today for right shoulder pain.  he has a history of   Patient Active Problem List   Diagnosis   • Change in blood platelet count   • Idiopathic insomnia   • Cutaneous eruption   • Acute post-traumatic neurosis   • LEN (obstructive sleep apnea)   • BP (high blood pressure)   • Fatigue   • Chronic mixed headache syndrome   • Allergic rhinitis   • Diarrhea   • Encounter for loop recorder at end of battery life   • Excessive daytime sleepiness   • History of multiple concussions   • Irritable bowel syndrome with diarrhea   • Impingement syndrome of right shoulder   • S/P bilateral inguinal hernia repair   • Sleep paralysis   • Vasovagal syncope   • PTSD (post-traumatic stress disorder)   • Allergic rhinitis   • LEN on CPAP   • Migraine headache without aura   • PSVT (paroxysmal supraventricular tachycardia) (CMS/HCC)   • Hyperlipidemia, mixed   • Gastroesophageal reflux disease without esophagitis   .    Did see ortho mos ago and had injection----only helped 10 days.  Next step is MRI.  He does not need precert from me.  I will print order today.  I do recommend right shoulder MRI--ortho did Xray a few mos ago.     Pain with all shoulder ROM; pain to lay on right shoulder  I see he called yesterday about his shoulder pain.  And noted prior surgery 7 years ago due to bone spurs and chronic impingement syndrome.  Reported experiencing discomfort mobility issues now and mirrors the symptoms he had before    Sees psych--on Gabapentin; will get N[facility] and do premed one time for MRI---can get today--wife works there.  The following portions of the patient's history were reviewed and updated as " appropriate: allergies, current medications, past family history, past medical history, past social history, past surgical history and problem list.    Review of Systems   Constitutional: Negative for activity change, appetite change and unexpected weight change.   HENT: Negative for nosebleeds and trouble swallowing.    Eyes: Negative for pain and visual disturbance.   Respiratory: Negative for chest tightness, shortness of breath and wheezing.    Cardiovascular: Negative for chest pain and palpitations.   Gastrointestinal: Negative for abdominal pain and blood in stool.   Endocrine: Negative.    Genitourinary: Negative for difficulty urinating and hematuria.   Musculoskeletal: Positive for arthralgias. Negative for joint swelling.   Skin: Negative for color change and rash.   Allergic/Immunologic: Negative.    Neurological: Negative for syncope and speech difficulty.   Hematological: Negative for adenopathy.   Psychiatric/Behavioral: Negative for agitation and confusion.   All other systems reviewed and are negative.      Objective   Physical Exam  Vitals and nursing note reviewed.   Constitutional:       General: He is not in acute distress.     Appearance: Normal appearance. He is well-developed. He is not diaphoretic.   HENT:      Head: Normocephalic.   Eyes:      Conjunctiva/sclera: Conjunctivae normal.      Pupils: Pupils are equal, round, and reactive to light.   Cardiovascular:      Rate and Rhythm: Normal rate.   Pulmonary:      Effort: Pulmonary effort is normal.   Musculoskeletal:         General: Tenderness present. Normal range of motion.      Cervical back: Normal range of motion and neck supple.      Comments: Sore to touch right shoulder and all ROM pain   Skin:     General: Skin is warm and dry.      Findings: No rash.   Neurological:      Mental Status: He is alert and oriented to person, place, and time.   Psychiatric:         Mood and Affect: Mood normal. Affect is not inappropriate.          Behavior: Behavior normal.         Thought Content: Thought content normal.         Judgment: Judgment normal.         Assessment/Plan   Diagnoses and all orders for this visit:    1. Acute pain of right shoulder (Primary)  -     MRI Shoulder Right Without Contrast; Future        Refer to ortho  Needs MRI right shoulder--pre med Ativan           Answers for HPI/ROS submitted by the patient on 9/9/2021  What is the primary reason for your visit?: Other  Please describe your symptoms.: Right shoulder pain,  mobility issues  Have you had these symptoms before?: Yes  How long have you been having these symptoms?: Greater than 2 weeks  Please describe any probable cause for these symptoms. : Chronic impingement syndrome,  had surgery approximately 8 years ago to clean out joint and remove bone spurs

## 2021-09-21 ENCOUNTER — OFFICE VISIT (OUTPATIENT)
Dept: ORTHOPEDIC SURGERY | Facility: CLINIC | Age: 46
End: 2021-09-21

## 2021-09-21 VITALS — WEIGHT: 194 LBS | TEMPERATURE: 98 F | BODY MASS INDEX: 29.4 KG/M2 | HEIGHT: 68 IN

## 2021-09-21 DIAGNOSIS — M79.601 PAIN OF RIGHT UPPER EXTREMITY: ICD-10-CM

## 2021-09-21 DIAGNOSIS — M75.41 SHOULDER IMPINGEMENT, RIGHT: Primary | ICD-10-CM

## 2021-09-21 PROCEDURE — 20610 DRAIN/INJ JOINT/BURSA W/O US: CPT | Performed by: NURSE PRACTITIONER

## 2021-09-21 PROCEDURE — 99204 OFFICE O/P NEW MOD 45 MIN: CPT | Performed by: NURSE PRACTITIONER

## 2021-09-21 PROCEDURE — 73030 X-RAY EXAM OF SHOULDER: CPT | Performed by: NURSE PRACTITIONER

## 2021-09-21 RX ORDER — LIDOCAINE HYDROCHLORIDE 20 MG/ML
5 INJECTION, SOLUTION EPIDURAL; INFILTRATION; INTRACAUDAL; PERINEURAL
Status: COMPLETED | OUTPATIENT
Start: 2021-09-21 | End: 2021-09-21

## 2021-09-21 RX ORDER — METHYLPREDNISOLONE ACETATE 80 MG/ML
80 INJECTION, SUSPENSION INTRA-ARTICULAR; INTRALESIONAL; INTRAMUSCULAR; SOFT TISSUE
Status: COMPLETED | OUTPATIENT
Start: 2021-09-21 | End: 2021-09-21

## 2021-09-21 RX ADMIN — METHYLPREDNISOLONE ACETATE 80 MG: 80 INJECTION, SUSPENSION INTRA-ARTICULAR; INTRALESIONAL; INTRAMUSCULAR; SOFT TISSUE at 13:38

## 2021-09-21 RX ADMIN — LIDOCAINE HYDROCHLORIDE 5 ML: 20 INJECTION, SOLUTION EPIDURAL; INFILTRATION; INTRACAUDAL; PERINEURAL at 13:38

## 2021-09-21 NOTE — PROGRESS NOTES
[facility]  New Patient      Patient Name: Colby Duenas  : 1975  Primary Care Physician: Mary Lopez PA-C        Chief Complaint:  R shoulder pain.     HPI:   Colby Duenas is a 46 y.o. year old who presents today for evaluation of R shoulder pain. Patient previously had right shoulder arthroscopy and debridement 7 years ago with a physician at [facility]. He did well after that, but noted return of his pain in about January of this year. He had no particular event or injury which restarted the pain. He works as an  and his job is quite demanding on his shoulders. He went back for an evaluation in February of this year, he reports they gave him an injection which did absolutely nothing for his pain symptoms. He has also used a dedicated regimen of anti-inflammatories, his physical therapy exercises, gentle stretching and strengthening all without alleviation. He comes today with new x-rays and an MRI for further evaluation and treatment.    In discussion with the patient he also endorses some pain and numbness and tingling which radiates down into the arm, the numbness and tingling gets better with position changes but he feels persistent weakness in the upper extremity and particularly with his  strength in the right hand. No dedicated neck pain but his shoulder pain is deep within the joint and also some posteriorly around the trapezius muscles.    He is right-hand dominant, pain is severe at 5 out of 10. It is waking him up at night. Pain is throbbing and grinding as well as some associated clicking and popping.      Past Medical/Surgical, Social and Family History:  I have reviewed and/or updated pertinent history as noted in the medical record including:  Past Medical History:   Diagnosis Date   • Allergic    • Anxiety    • Dislocation, shoulder    • Fracture, clavicle    • Knee swelling 10/94   • Low back strain    • Migraines    •  Periarthritis of shoulder 2014   • PSVT (paroxysmal supraventricular tachycardia) (CMS/HCC)    • Stress fracture    • Tear of meniscus of knee    • Tendinitis of knee      Past Surgical History:   Procedure Laterality Date   • CARDIAC CATHETERIZATION     • HAND SURGERY     • HERNIA REPAIR     • INGUINAL HERNIA REPAIR     • SHOULDER ACROMIOCLAVICULAR JOINT REPAIR     • SHOULDER SURGERY  2014   • TRIGGER POINT INJECTION  Multiple     Social History     Occupational History   • Not on file   Tobacco Use   • Smoking status: Former Smoker     Packs/day: 2.00     Years: 15.00     Pack years: 30.00     Types: Cigarettes     Start date: 7/10/1990     Quit date: 2017     Years since quittin.3   • Smokeless tobacco: Former User     Types: Chew     Quit date: 2014   Vaping Use   • Vaping Use: Never used   Substance and Sexual Activity   • Alcohol use: Not Currently     Alcohol/week: 0.0 standard drinks     Comment: Sober since 2018   • Drug use: Never   • Sexual activity: Yes     Partners: Female     Birth control/protection: None      Social History     Social History Narrative   • Not on file     Family History   Problem Relation Age of Onset   • Hypertension Mother    • Arthritis Mother    • Hypertension Father        Allergies:   Allergies   Allergen Reactions   • Oxycodone-Acetaminophen Hives   • Oxycodone-Acetaminophen        Medications:   Home Medications:  Current Outpatient Medications on File Prior to Visit   Medication Sig   • dicyclomine (BENTYL) 20 MG tablet Take 1 tablet by mouth 3 (Three) Times a Day As Needed (IBS).   • eletriptan (RELPAX) 20 MG tablet Take 1 tablet by mouth 1 (One) Time As Needed for Migraine for up to 1 dose. may repeat in 2 hours if necessary   • Erenumab-aooe (Aimovig) 140 MG/ML prefilled syringe Inject 1 mL under the skin into the appropriate area as directed Every 30 (Thirty) Days. For migraines   • FIBER COMPLETE tablet Take  by mouth.   •  fluticasone (FLONASE) 50 MCG/ACT nasal spray 1 spray into the nostril(s) as directed by provider Daily.   • gabapentin (NEURONTIN) 300 MG capsule Take 300 mg by mouth 3 (Three) Times a Day.   • LORazepam (ATIVAN) 1 MG tablet One PO one hour prior to medical test and may repeat X1 if needed at time of procedure   • melatonin 3 MG tablet Take  by mouth Daily.   • metoprolol succinate XL (TOPROL-XL) 50 MG 24 hr tablet Take 1 tablet by mouth Daily. For heart rate   • montelukast (SINGULAIR) 10 MG tablet Take 1 tablet by mouth Every Night.   • ondansetron ODT (ZOFRAN-ODT) 4 MG disintegrating tablet DISSOLVE ONE TABLET BY MOUTH EVERY 8 HOURS AS NEEDED FOR NAUSEA   • pantoprazole (PROTONIX) 20 MG EC tablet Take 1 tablet by mouth Daily. For GERD     No current facility-administered medications on file prior to visit.         ROS:  14 point review of systems was negative except as listed in the HPI and activity change, sleep disturbance, headache and environmental allergies.    Physical Exam:   46 y.o. male  Body mass index is 29.5 kg/m²., 88 kg (194 lb)  Vitals:    09/21/21 1019   Temp: 98 °F (36.7 °C)     General: Alert, cooperative, appears well and in no observable distress. Appears stated age and BMI as listed above.  HEENT: Normocephalic, atraumatic on external visual inspection.  CV: No significant peripheral edema.  Respiratory: Normal respiratory effort.  Skin: Warm & well perfused; appropriate skin turgor.  Psych: Appropriate mood & affect.  Neuro: Gross sensation and motor intact in affected extremity/extremities.  Vascular: Peripheral pulses palpable in affected extremity/extremities.     MSK Exam:  RIGHT Shoulder  No obvious deformity or wounds, atrophy: absent, tenderness:AC joint, lateral acromial, AROM:Flexion: 180; ER: 60; IR: lower thoracic spine, Cuff Strength: ER 4+; IR 5; Supraspinatus 4+, uncomfortable with isometric testing, +impingement signs, empty can supraspinatus test positive, external  rotation/infraspinatus test : positive and [place]'s Test: positive    LEFT Shoulder  No deformity or wounds.  No tenderness to palpation.  Full, painless AROM.  Cuff Strength 5/5 with ER, IR & Supraspinatus testing.  Negative provocative testing.    Cervical Spine:  No obvious deformity.  limitation of flexion: mild, limitation of rotation to the right: mild, limitation of rotation to the left: not present, limitation of extension: mild  Motor strength preserved in bilateral upper extremities. I do appreciate very slight discrepancy in his  strength. Mild weakness with shoulder abduction compared with the L. Bicep, tricep, wrist flex/ext WNL, digit abduction WNL.  SILT distally.      Radiology:    The following X-rays were ordered/reviewed today to evaluate the patient's symptoms: Shoulder: AP, Scapular Y and Axillary Lateral of right shoulder(s) show Mild degenerative changes in the shoulder particularly the AC joint without acute bony pathology.. There are no prior films available for direct comparison.     Patient also has an MRI which was performed on September 9, 2021 at [facility].  I independently reviewed those images and agree with the findings as noted by the radiologist. Patient has minimal AC joint arthrosis with supraspinatus contact.  There is mild tendinopathy in the supraspinatus and infraspinatus without a tear.  The remainder of the shoulder structures are unremarkable.    Procedure:   See Procedure Note: The potential risks and benefits of performing a diagnostic and therapeutic injection were discussed with the patient prior to procedure. Risks include, but are not limited to infection, swelling, transient increase in pain, bleeding, bruising. Patient was advised that injections are a diagnostic and therapeutic tool meaning they may not alleviate symptoms at all, or may only provide partial or temporary relief.     Large Joint Arthrocentesis: R subacromial bursa  Date/Time: 9/21/2021 1:38  PM  Consent given by: patient  Site marked: site marked  Timeout: Immediately prior to procedure a time out was called to verify the correct patient, procedure, equipment, support staff and site/side marked as required   Supporting Documentation  Indications: pain   Procedure Details  Location: shoulder - R subacromial bursa  Preparation: Patient was prepped and draped in the usual sterile fashion  Needle size: 22 G  Approach: posterior  Medications administered: 80 mg methylPREDNISolone acetate 80 MG/ML; 5 mL lidocaine PF 2% 2 %  Patient tolerance: patient tolerated the procedure well with no immediate complications            Misc. Data/Labs: N/A    Assessment & Plan:    This is a 46-year-old male with recurrent right shoulder pain.  He was quite discouraged after a cortisone injection in February which gave him no relief.  He does have some tendinitis in the rotator cuff but no appreciable tear.  He does have some concerning symptoms of numbness and tingling in the right upper extremity which are concerning for neck pathology and or nerve impingement.    I discussed the case with Dr. Caruso and we agree that it would be best to try and exhaust conservative measures first.  I would like to start with repeating the cortisone injection as a diagnostic and therapeutic tool.  Patient was agreeable to that today and tolerated the procedure well.  If again he gets no relief and that I think we would need to work-up the chain and evaluate his neck.  His shoulder MRI results do not really show anything significant enough to cause him the amount of pain that he is currently having.  Additionally if we work the neck up and are unable to find anything clinically significant might consider repeating the MRI with an arthrogram, certainly if there is something there we will get him to Dr. Caruso to consider repeat arthroscopy.    Discussed this plan thoroughly with the patient and he was in agreement.  He will continue to use  his over-the-counter medications and other conservative treatments.  We will follow up with him in 2 weeks.    Injection provided in the office today. Tolerated well with no immediate complications. Injection precautions discussed with the patient., Patient encouraged to call with questions or concerns prior to follow up. , Patient instructed on appropriate use of NSAIDs and signs/symptoms of adverse reactions. Patient advised to stop medications and notify the office in the event of any noted side effects.  and Patient instructed on use of ICE therapy PRN for pain and swelling.      ICD-10-CM ICD-9-CM   1. Shoulder impingement, right  M75.41 726.2   2. Pain of right upper extremity  M79.601 729.5     Imaging Results (Most Recent)     Procedure Component Value Units Date/Time    XR Shoulder 2+ View Right [[id number]] Resulted: 09/21/21 1018     Updated: 09/21/21 1018    Impression:      Ordering physician's impression is located in the Encounter Note dated 09/21/21. X-ray performed in the DR room.          No orders of the defined types were placed in this encounter.    Orders Placed This Encounter   Procedures   • XR Shoulder 2+ View Right     Return in about 2 weeks (around 10/5/2021) for Recheck.    FORD Cancino      Dictated Utilizing Dragon Dictation

## 2021-10-05 ENCOUNTER — OFFICE VISIT (OUTPATIENT)
Dept: ORTHOPEDIC SURGERY | Facility: CLINIC | Age: 46
End: 2021-10-05

## 2021-10-05 VITALS — TEMPERATURE: 97.3 F | BODY MASS INDEX: 29.4 KG/M2 | HEIGHT: 68 IN | WEIGHT: 194 LBS

## 2021-10-05 DIAGNOSIS — M25.511 ACUTE PAIN OF RIGHT SHOULDER: ICD-10-CM

## 2021-10-05 DIAGNOSIS — S43.431D TEAR OF RIGHT GLENOID LABRUM, SUBSEQUENT ENCOUNTER: Primary | ICD-10-CM

## 2021-10-05 DIAGNOSIS — M79.601 PAIN OF RIGHT UPPER EXTREMITY: ICD-10-CM

## 2021-10-05 PROCEDURE — 99214 OFFICE O/P EST MOD 30 MIN: CPT | Performed by: NURSE PRACTITIONER

## 2021-10-05 RX ORDER — LORAZEPAM 1 MG/1
TABLET ORAL
Qty: 2 TABLET | Refills: 0 | Status: SHIPPED | OUTPATIENT
Start: 2021-10-05 | End: 2021-11-17

## 2021-10-05 NOTE — PROGRESS NOTES
[facility]              Follow Up      Patient Name: Colby Duenas  : 1975  Primary Care Physician: Mary Lopez PA-C        Chief Complaint: R Shoulder pain.    HPI:   Colby Duenas is a 46 y.o. year old who presents today for evaluation of R shoulder pain.  Patient previously had right shoulder arthroscopy and debridement 7 years ago in the same shoulder and did well after that but his pain returned in January of this year.  There was no particular event or injury pain gradually came back and he really felt it was quite similar to his symptoms before.  He had new x-rays and an MRI on 2021 without contrast which suggested minimal AC joint arthrosis with some supraspinatus contact.  Additionally he had some tendinopathy but no tear.    The severity of his symptoms did not quite reflect what was revealed on the MRI so we elected to try 1 additional cortisone injection he had previously had one in February with no relief.  The injection we gave him 2 weeks ago did temporarily help his symptoms for about half a week and then returned right back to the baseline.    He has also done a strict regimen of anti-inflammatories, physical therapy, stretching strengthening all without alleviation.  He works as an  and does have some symptoms especially while he is working and driving for long distances.  Pain is mostly in the anterior shoulder some radiates posteriorly, he does have some sensation of weakness in the arm with activities but reports is really not so much numbness and tingling or electric shock type pain.      Past Medical/Surgical, Social and Family History:  I have reviewed and/or updated pertinent history as noted in the medical record including:  Past Medical History:   Diagnosis Date   • Allergic    • Anxiety    • Dislocation, shoulder    • Fracture, clavicle    • Knee swelling 10/94   • Low back strain    • Migraines    • Periarthritis of shoulder  2014   • PSVT (paroxysmal supraventricular tachycardia) ([facility])    • Stress fracture    • Tear of meniscus of knee    • Tendinitis of knee      Past Surgical History:   Procedure Laterality Date   • CARDIAC CATHETERIZATION     • HAND SURGERY     • HERNIA REPAIR     • INGUINAL HERNIA REPAIR     • SHOULDER ACROMIOCLAVICULAR JOINT REPAIR     • SHOULDER SURGERY  2014   • TRIGGER POINT INJECTION  Multiple     Social History     Occupational History   • Not on file   Tobacco Use   • Smoking status: Former Smoker     Packs/day: 2.00     Years: 15.00     Pack years: 30.00     Types: Cigarettes     Start date: 7/10/1990     Quit date: 2017     Years since quittin.3   • Smokeless tobacco: Former User     Types: Chew     Quit date: 2014   Vaping Use   • Vaping Use: Never used   Substance and Sexual Activity   • Alcohol use: Not Currently     Alcohol/week: 0.0 standard drinks     Comment: Sober since 2018   • Drug use: Never   • Sexual activity: Yes     Partners: Female     Birth control/protection: None      Social History     Social History Narrative   • Not on file     Family History   Problem Relation Age of Onset   • Hypertension Mother    • Arthritis Mother    • Hypertension Father        Allergies:   Allergies   Allergen Reactions   • Oxycodone-Acetaminophen Hives   • Oxycodone-Acetaminophen        Medications:   Home Medications:  Current Outpatient Medications on File Prior to Visit   Medication Sig   • dicyclomine (BENTYL) 20 MG tablet Take 1 tablet by mouth 3 (Three) Times a Day As Needed (IBS).   • Erenumab-aooe (Aimovig) 140 MG/ML prefilled syringe Inject 1 mL under the skin into the appropriate area as directed Every 30 (Thirty) Days. For migraines   • FIBER COMPLETE tablet Take  by mouth.   • gabapentin (NEURONTIN) 300 MG capsule Take 300 mg by mouth 3 (Three) Times a Day.   • melatonin 3 MG tablet Take  by mouth Daily.   • metoprolol succinate XL (TOPROL-XL) 50 MG 24 hr tablet  Take 1 tablet by mouth Daily. For heart rate   • montelukast (SINGULAIR) 10 MG tablet Take 1 tablet by mouth Every Night.   • pantoprazole (PROTONIX) 20 MG EC tablet Take 1 tablet by mouth Daily. For GERD   • eletriptan (RELPAX) 20 MG tablet Take 1 tablet by mouth 1 (One) Time As Needed for Migraine for up to 1 dose. may repeat in 2 hours if necessary   • fluticasone (FLONASE) 50 MCG/ACT nasal spray 1 spray into the nostril(s) as directed by provider Daily.   • ondansetron ODT (ZOFRAN-ODT) 4 MG disintegrating tablet DISSOLVE ONE TABLET BY MOUTH EVERY 8 HOURS AS NEEDED FOR NAUSEA   • [DISCONTINUED] LORazepam (ATIVAN) 1 MG tablet One PO one hour prior to medical test and may repeat X1 if needed at time of procedure     No current facility-administered medications on file prior to visit.         ROS:  ROS negative except as listed in the HPI.    Physical Exam:   46 y.o. male  Body mass index is 29.5 kg/m²., 88 kg (194 lb)  Vitals:    10/05/21 0834   Temp: 97.3 °F (36.3 °C)     General: Alert, cooperative, appears well and in no observable distress. Appears stated age and BMI as listed above.  HEENT: Normocephalic, atraumatic on external visual inspection.  CV: No significant peripheral edema.  Respiratory: Normal respiratory effort.  Skin: Warm & well perfused; appropriate skin turgor.  Psych: Appropriate mood & affect.  Neuro: Gross sensation and motor intact in affected extremity/extremities.  Vascular: Peripheral pulses palpable in affected extremity/extremities.     MSK Exam:  RIGHT Shoulder  No obvious deformity or wounds, atrophy: absent, tenderness:AC joint, lateral acromial, AROM:Flexion: 180; ER: 60; IR: lower thoracic spine, Cuff Strength: ER 4+; IR 5; Supraspinatus 4+, uncomfortable with isometric testing,+ labral signs, mild apprehension, +impingement signs, empty can supraspinatus test positive, external rotation/infraspinatus test : positive and [place]'s Test: positive     LEFT Shoulder  No deformity or  wounds.  No tenderness to palpation.  Full, painless AROM.  Cuff Strength 5/5 with ER, IR & Supraspinatus testing.  Negative provocative testing.        Radiology:    No new images were needed at the visit today.      Prior images were reviewed today, these were taken at the last visit 2 weeks ago and show only mild degenerative changes at the AC joint.    Again as stated the MRI performed on September 9, 2021 without contrast at [facility] shows minimal AC joint arthrosis with supraspinatus contact, mild tendinopathy without tear in the supraspinatus and infraspinatus.  No other acute or chronic pathology was appreciated.    Procedure:   N/A      Misc. Data/Labs: N/A    Assessment & Plan:    This is a 46-year-old male with recurrent right shoulder pain.  We elected to try a cortisone injection at the last visit and he did get some temporary relief when previously he had not gotten any relief back in February.  This does make me suspicious that he has some pathology in the shoulder itself.  After discussion at our last appointment with  we considered repeating his MRI with an arthrogram depending on how he responded.  Certainly some of his symptoms sound like they could be labral, and his exam does support that.      Recommend to proceed with the MRI arthrogram of the right shoulder.  Once we get the results I will review those and discuss them with the patient.  Ultimately would like to have him follow-up with Dr. Caruso for additional recommendations and treatments.     I did refill the Ativan for him to use during the procedure.  I gave him to 1 mg tablets with instructions for use.  I checked the [facility] report and it is appropriate to prescribe that for acute claustrophobia related to a medical procedure.    Patient encouraged to call with questions or concerns prior to follow up.       ICD-10-CM ICD-9-CM   1. Tear of right glenoid labrum, subsequent encounter  S43.431D V58.89     840.8   2. Pain  of right upper extremity  M79.601 729.5   3. Acute pain of right shoulder  M25.511 719.41     Imaging Results (Most Recent)     None        New Medications Ordered This Visit   Medications   • LORazepam (ATIVAN) 1 MG tablet     Sig: One PO one hour prior to medical test and may repeat X1 if needed at time of procedure     Dispense:  2 tablet     Refill:  0     Orders Placed This Encounter   Procedures   • MRI Shoulder Right Arthrogram   • FL Contrast Injection CT / MRI     Return for Appointment after MRI is complete..    Navi Mcdonald, FORD      Dictated Utilizing Dragon Dictation

## 2021-10-20 ENCOUNTER — TELEPHONE (OUTPATIENT)
Dept: ORTHOPEDIC SURGERY | Facility: CLINIC | Age: 46
End: 2021-10-20

## 2021-10-25 ENCOUNTER — OFFICE VISIT (OUTPATIENT)
Dept: ORTHOPEDIC SURGERY | Facility: CLINIC | Age: 46
End: 2021-10-25

## 2021-10-25 VITALS — HEIGHT: 68 IN | WEIGHT: 190 LBS | TEMPERATURE: 97.8 F | BODY MASS INDEX: 28.79 KG/M2

## 2021-10-25 DIAGNOSIS — M75.41 IMPINGEMENT SYNDROME OF RIGHT SHOULDER: Primary | ICD-10-CM

## 2021-10-25 PROCEDURE — 99214 OFFICE O/P EST MOD 30 MIN: CPT | Performed by: ORTHOPAEDIC SURGERY

## 2021-10-25 RX ORDER — CEFAZOLIN SODIUM 2 G/100ML
2 INJECTION, SOLUTION INTRAVENOUS ONCE
Status: CANCELLED | OUTPATIENT
Start: 2021-11-24 | End: 2021-10-25

## 2021-10-25 NOTE — PROGRESS NOTES
Patient: Colby Duenas  YOB: 1975  Date of Service: 10/25/2021    Chief Complaints:   Chief Complaint   Patient presents with   • Right Shoulder - Establish Care, Pain     Right shoulder pain  Subjective:    History of Present Illness: Pt is seen in the office today with complaints of Colby Duenas is a 46 y.o. year old who presents today for evaluation of R shoulder pain. Patient previously had right shoulder arthroscopy and debridement 7 years ago with a physician at [facility]. He did well after that, but noted return of his pain in about January of this year. He had no particular event or injury which restarted the pain. He works as an  and his job is quite demanding on his shoulders. He went back for an evaluation in February of this year, he reports they gave him an injection which did absolutely nothing for his pain symptoms. He has also used a dedicated regimen of anti-inflammatories, his physical therapy exercises, gentle stretching and strengthening all without alleviation. He comes today with new x-rays and an MRI for further evaluation and treatment.     In discussion with the patient he also endorses some pain and numbness and tingling which radiates down into the arm, the numbness and tingling gets better with position changes but he feels persistent weakness in the upper extremity and particularly with his  strength in the right hand. No dedicated neck pain but his shoulder pain is deep within the joint and also some posteriorly around the trapezius muscles.    He has done conservative management, physical therapy injections with no significant lasting relief but temporary relief.  He does have an MR arthrogram which shows some tendinosis of the rotator cuff no obvious tear and obvious labral tear.  He is quite symptomatic and limit his activities and wishes to proceed the next step.  His symptoms are moderate intermittent aching worse with activity  somewhat better with rest his past medical history is well listed below reviewed by me     Chief Complaint   Patient presents with   • Right Shoulder - Establish Care, Pain   .          Allergies:   Allergies   Allergen Reactions   • Oxycodone-Acetaminophen Hives   • Oxycodone-Acetaminophen        Medications:   Home Medications:  Current Outpatient Medications on File Prior to Visit   Medication Sig   • dicyclomine (BENTYL) 20 MG tablet Take 1 tablet by mouth 3 (Three) Times a Day As Needed (IBS).   • eletriptan (RELPAX) 20 MG tablet Take 1 tablet by mouth 1 (One) Time As Needed for Migraine for up to 1 dose. may repeat in 2 hours if necessary   • Erenumab-aooe (Aimovig) 140 MG/ML prefilled syringe Inject 1 mL under the skin into the appropriate area as directed Every 30 (Thirty) Days. For migraines   • FIBER COMPLETE tablet Take  by mouth.   • fluticasone (FLONASE) 50 MCG/ACT nasal spray 1 spray into the nostril(s) as directed by provider Daily.   • gabapentin (NEURONTIN) 300 MG capsule Take 300 mg by mouth 3 (Three) Times a Day.   • LORazepam (ATIVAN) 1 MG tablet One PO one hour prior to medical test and may repeat X1 if needed at time of procedure   • melatonin 3 MG tablet Take  by mouth Daily.   • metoprolol succinate XL (TOPROL-XL) 50 MG 24 hr tablet Take 1 tablet by mouth Daily. For heart rate   • montelukast (SINGULAIR) 10 MG tablet Take 1 tablet by mouth Every Night.   • ondansetron ODT (ZOFRAN-ODT) 4 MG disintegrating tablet DISSOLVE ONE TABLET BY MOUTH EVERY 8 HOURS AS NEEDED FOR NAUSEA   • pantoprazole (PROTONIX) 20 MG EC tablet Take 1 tablet by mouth Daily. For GERD     No current facility-administered medications on file prior to visit.     Current Medications:  Scheduled Meds:  Continuous Infusions:No current facility-administered medications for this visit.    PRN Meds:.    I have reviewed the patient's medical history in detail and updated the computerized patient record.  Review and summarization  of old records include:    Past Medical History:   Diagnosis Date   • Allergic    • Anxiety    • Dislocation, shoulder    • Fracture, clavicle    • Knee swelling 10/94   • Low back strain    • Migraines    • Periarthritis of shoulder 2014   • PSVT (paroxysmal supraventricular tachycardia) (HCC)    • Stress fracture    • Tear of meniscus of knee    • Tendinitis of knee         Past Surgical History:   Procedure Laterality Date   • CARDIAC CATHETERIZATION     • HAND SURGERY     • HERNIA REPAIR     • INGUINAL HERNIA REPAIR     • SHOULDER ACROMIOCLAVICULAR JOINT REPAIR     • SHOULDER SURGERY  2014   • TRIGGER POINT INJECTION  Multiple        Social History     Occupational History   • Not on file   Tobacco Use   • Smoking status: Former Smoker     Packs/day: 2.00     Years: 15.00     Pack years: 30.00     Types: Cigarettes     Start date: 7/10/1990     Quit date: 2017     Years since quittin.4   • Smokeless tobacco: Former User     Types: Chew     Quit date: 2014   Vaping Use   • Vaping Use: Never used   Substance and Sexual Activity   • Alcohol use: Not Currently     Alcohol/week: 0.0 standard drinks     Comment: Sober since 2018   • Drug use: Never   • Sexual activity: Yes     Partners: Female     Birth control/protection: None      Social History     Social History Narrative   • Not on file        Family History   Problem Relation Age of Onset   • Hypertension Mother    • Arthritis Mother    • Hypertension Father        ROS: 14 point review of systems was performed and was negative except for documented findings in HPI and today's encounter.     Allergies:   Allergies   Allergen Reactions   • Oxycodone-Acetaminophen Hives   • Oxycodone-Acetaminophen      Constitutional:  Denies fever, shaking or chills   Eyes:  Denies change in visual acuity   HENT:  Denies nasal congestion or sore throat   Respiratory:  Denies cough or shortness of breath   Cardiovascular:  Denies  "chest pain or severe LE edema   GI:  Denies abdominal pain, nausea, vomiting, bloody stools or diarrhea   Musculoskeletal:  Numbness, tingling, or loss of motor function only as noted above in history of present illness.  : Denies painful urination or hematuria  Integument:  Denies rash, lesion or ulceration   Neurologic:  Denies headache or focal weakness  Endocrine:  Denies lymphadenopathy  Psych:  Denies confusion or change in mental status   Hem:  Denies active bleeding      Physical Exam: 46 y.o. male  Wt Readings from Last 3 Encounters:   10/25/21 86.2 kg (190 lb)   10/05/21 88 kg (194 lb)   09/21/21 88 kg (194 lb)       Body mass index is 28.89 kg/m².  Facility age limit for growth percentiles is 20 years.  Vitals:    10/25/21 1523   Temp: 97.8 °F (36.6 °C)     Vital signs reviewed.   General Appearance:    Alert, cooperative, in no acute distress                    Ortho exam  Physical exam of the right shoulder reveals no overlying skin changes no lymphedema no lymphadenopathy.  Patient has active flexion 180 with mild symptoms abduction is similar external rotation is to 50 and internal rotation to the upper lumbar spine with mild symptoms.  Patient has good rotator cuff strength 4+ over 5 with isometric strength testing with pain.  Patient has a positive impingement and a positive Moreno sign.  Patient has good cervical range of motion which is full and asymptomatic no radicular symptoms.  Patient has a normal elbow exam.  Good distal pulses are present  Patient has pain with overhead activity and a positive Neer sign and a positive empty can sign , a positive drop arm and a definitive painful arc    Physical Exam: 46 y.o. male  General Appearance:    Alert, cooperative, in no acute distress                      Vitals:    10/25/21 1523   Temp: 97.8 °F (36.6 °C)   Weight: 86.2 kg (190 lb)   Height: 172.7 cm (68\")   PainSc:   4        Head:    Normocephalic, without obvious abnormality, atraumatic "   Eyes:            conjunctivae and sclerae normal, no pallor, corneas clear,    Ears:    Ears appear intact with no abnormalities noted   Throat:   No oral lesions, no thrush, oral mucosa moist   Neck:   No adenopathy, supple, trachea midline, no thyromegaly,    Back:     No kyphosis present, no scoliosis present, no skin lesions,      erythema or scars, no tenderness to percussion or                   palpation,   range of motion normal   Lungs:     Clear to auscultation,respirations regular, even and                  unlabored    Heart:    Regular rhythm and normal rate               Chest Wall:    No abnormalities observed               Pulses:   Pulses palpable and equal bilaterally   Skin:   No bleeding, bruising or rash   Lymph nodes:   No palpable adenopathy   Neurologic:   Appears neurologic intact       I did review x-rays taken by [facility] he has a mild degenerative change acromioclavicular joint otherwise no significant abnormality also reviewed his MRI which shows marked tendinosis of the rotator cuff no obvious tear have reviewed all of the above and agree with     .time    Assessment: Persistent right shoulder pain despite conservative measures he is failed all the conservative management he is an MRI which shows some tendinosis.  Plan is to proceed with arthroscopy would also discussed with him possibility of decompression.  I discussed in detail other possibilities that could be found at time of surgery including a more significant rotator cuff injury requiring rotator cuff repair.  Also talked about the potential for labral injury requiring debridement or repair as well as addressing the biceps tendon with debridement tenotomy or tenodesis.  I discussed all these in detail with the patient he understands and agrees to proceed we also talked about risk benefits and alternatives  The patient voiced understanding of the risks, benefits, and alternative forms of treatment that were discussed and the  patient consents to proceed with the above listed surgery.  All risks, benefits and alternatives were discussed.  Risks including to but not exclusive to anesthetic complications, including death, MI, CVA, infection, bleeding DVT, fracture, residual pain and need for future surgery.      Plan: Plan is as above  Follow up as indicated.  Ice, elevate, and rest as needed.  Radha Caruso M.D.

## 2021-10-29 ENCOUNTER — TELEPHONE (OUTPATIENT)
Dept: ORTHOPEDIC SURGERY | Facility: CLINIC | Age: 46
End: 2021-10-29

## 2021-11-17 ENCOUNTER — PRE-ADMISSION TESTING (OUTPATIENT)
Dept: PREADMISSION TESTING | Facility: HOSPITAL | Age: 46
End: 2021-11-17

## 2021-11-17 VITALS
TEMPERATURE: 98.8 F | WEIGHT: 194 LBS | HEART RATE: 66 BPM | OXYGEN SATURATION: 98 % | DIASTOLIC BLOOD PRESSURE: 80 MMHG | SYSTOLIC BLOOD PRESSURE: 118 MMHG | BODY MASS INDEX: 29.4 KG/M2 | HEIGHT: 68 IN | RESPIRATION RATE: 16 BRPM

## 2021-11-17 LAB
ANION GAP SERPL CALCULATED.3IONS-SCNC: 8.2 MMOL/L (ref 5–15)
BUN SERPL-MCNC: 12 MG/DL (ref 6–20)
BUN/CREAT SERPL: 11.1 (ref 7–25)
CALCIUM SPEC-SCNC: 9 MG/DL (ref 8.6–10.5)
CHLORIDE SERPL-SCNC: 102 MMOL/L (ref 98–107)
CO2 SERPL-SCNC: 25.8 MMOL/L (ref 22–29)
CREAT SERPL-MCNC: 1.08 MG/DL (ref 0.76–1.27)
DEPRECATED RDW RBC AUTO: 40 FL (ref 37–54)
ERYTHROCYTE [DISTWIDTH] IN BLOOD BY AUTOMATED COUNT: 13 % (ref 12.3–15.4)
GFR SERPL CREATININE-BSD FRML MDRD: 73 ML/MIN/1.73
GLUCOSE SERPL-MCNC: 91 MG/DL (ref 65–99)
HCT VFR BLD AUTO: 39.5 % (ref 37.5–51)
HGB BLD-MCNC: 14.3 G/DL (ref 13–17.7)
MCH RBC QN AUTO: 31.1 PG (ref 26.6–33)
MCHC RBC AUTO-ENTMCNC: 36.2 G/DL (ref 31.5–35.7)
MCV RBC AUTO: 85.9 FL (ref 79–97)
PLATELET # BLD AUTO: 133 10*3/MM3 (ref 140–450)
PMV BLD AUTO: 12.9 FL (ref 6–12)
POTASSIUM SERPL-SCNC: 4.3 MMOL/L (ref 3.5–5.2)
QT INTERVAL: 429 MS
RBC # BLD AUTO: 4.6 10*6/MM3 (ref 4.14–5.8)
SODIUM SERPL-SCNC: 136 MMOL/L (ref 136–145)
WBC # BLD AUTO: 6.34 10*3/MM3 (ref 3.4–10.8)

## 2021-11-17 PROCEDURE — 93010 ELECTROCARDIOGRAM REPORT: CPT | Performed by: INTERNAL MEDICINE

## 2021-11-17 PROCEDURE — 93005 ELECTROCARDIOGRAM TRACING: CPT

## 2021-11-17 PROCEDURE — 80048 BASIC METABOLIC PNL TOTAL CA: CPT

## 2021-11-17 PROCEDURE — 36415 COLL VENOUS BLD VENIPUNCTURE: CPT

## 2021-11-17 PROCEDURE — 85027 COMPLETE CBC AUTOMATED: CPT

## 2021-11-17 NOTE — DISCHARGE INSTRUCTIONS
Take the following medications the morning of surgery:  METOPROLOL, PANTOPRAZOLE      ARRIVE 7:45 AM  11/24    If you are on prescription narcotic pain medication to control your pain you may also take that medication the morning of surgery.    General Instructions:  • Do not eat solid food after midnight the night before surgery.  • You may drink clear liquids day of surgery but must stop at least one hour before your hospital arrival time.  • It is beneficial for you to have a clear drink that contains carbohydrates the day of surgery.  We suggest a 12 to 20 ounce bottle of Gatorade or Powerade for non-diabetic patients or a 12 to 20 ounce bottle of G2 or Powerade Zero for diabetic patients. (Pediatric patients, are not advised to drink a 12 to 20 ounce carbohydrate drink)    Clear liquids are liquids you can see through.  Nothing red in color.     Plain water                               Sports drinks  Sodas                                   Gelatin (Jell-O)  Fruit juices without pulp such as white grape juice and apple juice  Popsicles that contain no fruit or yogurt  Tea or coffee (no cream or milk added)  Gatorade / Powerade  G2 / Powerade Zero    • Infants may have breast milk up to four hours before surgery.  • Infants drinking formula may drink formula up to six hours before surgery.   • Patients who avoid smoking, chewing tobacco and alcohol for 4 weeks prior to surgery have a reduced risk of post-operative complications.  Quit smoking as many days before surgery as you can.  • Do not smoke, use chewing tobacco or drink alcohol the day of surgery.   • If applicable bring your C-PAP/ BI-PAP machine.  • Bring any papers given to you in the doctor’s office.  • Wear clean comfortable clothes.  • Do not wear contact lenses, false eyelashes or make-up.  Bring a case for your glasses.   • Bring crutches or walker if applicable.  • Remove all piercings.  Leave jewelry and any other valuables at home.  • Hair  extensions with metal clips must be removed prior to surgery.  • The Pre-Admission Testing nurse will instruct you to bring medications if unable to obtain an accurate list in Pre-Admission Testing.        If you were given a blood bank ID arm band remember to bring it with you the day of surgery.  Preventing a Surgical Site Infection:  • For 2 to 3 days before surgery, avoid shaving with a razor because the razor can irritate skin and make it easier to develop an infection.    • Any areas of open skin can increase the risk of a post-operative wound infection by allowing bacteria to enter and travel throughout the body.  Notify your surgeon if you have any skin wounds / rashes even if it is not near the expected surgical site.  The area will need assessed to determine if surgery should be delayed until it is healed.  • The night prior to surgery shower using a fresh bar of anti-bacterial soap (such as Dial) and clean washcloth.  Sleep in a clean bed with clean clothing.  Do not allow pets to sleep with you.  • Shower on the morning of surgery using a fresh bar of anti-bacterial soap (such as Dial) and clean washcloth.  Dry with a clean towel and dress in clean clothing.  • Ask your surgeon if you will be receiving antibiotics prior to surgery.  • Make sure you, your family, and all healthcare providers clean their hands with soap and water or an alcohol based hand  before caring for you or your wound.    Day of surgery:  Your arrival time is approximately two hours before your scheduled surgery time.  Upon arrival, a Pre-op nurse and Anesthesiologist will review your health history, obtain vital signs, and answer questions you may have.  The only belongings needed at this time will be a list of your home medications and if applicable your C-PAP/BI-PAP machine.  A Pre-op nurse will start an IV and you may receive medication in preparation for surgery, including something to help you relax.     Please be  aware that surgery does come with discomfort.  We want to make every effort to control your discomfort so please discuss any uncontrolled symptoms with your nurse.   Your doctor will most likely have prescribed pain medications.      If you are going home after surgery you will receive individualized written care instructions before being discharged.  A responsible adult must drive you to and from the hospital on the day of your surgery and stay with you for 24 hours.  Discharge prescriptions can be filled by the hospital pharmacy during regular pharmacy hours.  If you are having surgery late in the day/evening your prescription may be e-prescribed to your pharmacy.  Please verify your pharmacy hours or chose a 24 hour pharmacy to avoid not having access to your prescription because your pharmacy has closed for the day.    If you are staying overnight following surgery, you will be transported to your hospital room following the recovery period.  [facility] has all private rooms.    If you have any questions please call Pre-Admission Testing at [phone].  Deductibles and co-payments are collected on the day of service. Please be prepared to pay the required co-pay, deductible or deposit on the day of service as defined by your plan.    Patient Education for Self-Quarantine Process    • Following your COVID testing, we strongly recommend that you wear a mask when you are with other people and practice social distancing.   • Limit your activities to only required outings.  • Wash your hands with soap and water frequently for at least 20 seconds.   • Avoid touching your eyes, nose and mouth with unwashed hands.  • Do not share anything - utensils, drinking glasses, food from the same bowl.   • Sanitize household surfaces daily. Include all high touch areas (door handles, light switches, phones, countertops, etc.)    Call your surgeon immediately if you experience any of the following  symptoms:  • Sore Throat  • Shortness of Breath or difficulty breathing  • Cough  • Chills  • Body soreness or muscle pain  • Headache  • Fever  • New loss of taste or smell  • Do not arrive for your surgery ill.  Your procedure will need to be rescheduled to another time.  You will need to call your physician before the day of surgery to avoid any unnecessary exposure to hospital staff as well as other patients.

## 2021-11-23 ENCOUNTER — LAB (OUTPATIENT)
Dept: LAB | Facility: HOSPITAL | Age: 46
End: 2021-11-23

## 2021-11-23 DIAGNOSIS — M75.41 IMPINGEMENT SYNDROME OF RIGHT SHOULDER: ICD-10-CM

## 2021-11-23 LAB — SARS-COV-2 ORF1AB RESP QL NAA+PROBE: NOT DETECTED

## 2021-11-23 PROCEDURE — C9803 HOPD COVID-19 SPEC COLLECT: HCPCS

## 2021-11-23 PROCEDURE — U0004 COV-19 TEST NON-CDC HGH THRU: HCPCS

## 2021-11-24 ENCOUNTER — ANESTHESIA (OUTPATIENT)
Dept: PERIOP | Facility: HOSPITAL | Age: 46
End: 2021-11-24

## 2021-11-24 ENCOUNTER — ANESTHESIA EVENT (OUTPATIENT)
Dept: PERIOP | Facility: HOSPITAL | Age: 46
End: 2021-11-24

## 2021-11-24 ENCOUNTER — HOSPITAL ENCOUNTER (OUTPATIENT)
Facility: HOSPITAL | Age: 46
Setting detail: HOSPITAL OUTPATIENT SURGERY
Discharge: HOME | End: 2021-11-24
Attending: ORTHOPAEDIC SURGERY | Admitting: ORTHOPAEDIC SURGERY

## 2021-11-24 VITALS
DIASTOLIC BLOOD PRESSURE: 90 MMHG | SYSTOLIC BLOOD PRESSURE: 133 MMHG | RESPIRATION RATE: 16 BRPM | HEART RATE: 75 BPM | TEMPERATURE: 97.8 F | OXYGEN SATURATION: 95 %

## 2021-11-24 DIAGNOSIS — M75.41 IMPINGEMENT SYNDROME OF RIGHT SHOULDER: ICD-10-CM

## 2021-11-24 PROCEDURE — 29822 SHO ARTHRS SRG LMTD DBRDMT: CPT | Performed by: ORTHOPAEDIC SURGERY

## 2021-11-24 PROCEDURE — 25010000002 FENTANYL CITRATE (PF) 50 MCG/ML SOLUTION: Performed by: ANESTHESIOLOGY

## 2021-11-24 PROCEDURE — 25010000002 ONDANSETRON PER 1 MG: Performed by: NURSE ANESTHETIST, CERTIFIED REGISTERED

## 2021-11-24 PROCEDURE — C9290 INJ, BUPIVACAINE LIPOSOME: HCPCS | Performed by: ANESTHESIOLOGY

## 2021-11-24 PROCEDURE — 25010000002 MIDAZOLAM PER 1 MG: Performed by: ANESTHESIOLOGY

## 2021-11-24 PROCEDURE — 25010000002 FENTANYL CITRATE (PF) 50 MCG/ML SOLUTION: Performed by: NURSE ANESTHETIST, CERTIFIED REGISTERED

## 2021-11-24 PROCEDURE — 25010000002 PHENYLEPHRINE 10 MG/ML SOLUTION: Performed by: NURSE ANESTHETIST, CERTIFIED REGISTERED

## 2021-11-24 PROCEDURE — 25010000002 PROPOFOL 10 MG/ML EMULSION: Performed by: NURSE ANESTHETIST, CERTIFIED REGISTERED

## 2021-11-24 PROCEDURE — 29826 SHO ARTHRS SRG DECOMPRESSION: CPT | Performed by: ORTHOPAEDIC SURGERY

## 2021-11-24 PROCEDURE — 76942 ECHO GUIDE FOR BIOPSY: CPT | Performed by: ORTHOPAEDIC SURGERY

## 2021-11-24 PROCEDURE — 25010000002 EPINEPHRINE PER 0.1 MG: Performed by: ORTHOPAEDIC SURGERY

## 2021-11-24 PROCEDURE — 0 BUPIVACAINE LIPOSOME 1.3 % SUSPENSION: Performed by: ANESTHESIOLOGY

## 2021-11-24 PROCEDURE — 25010000002 DEXAMETHASONE PER 1 MG: Performed by: NURSE ANESTHETIST, CERTIFIED REGISTERED

## 2021-11-24 PROCEDURE — 0 CEFAZOLIN IN DEXTROSE 2-4 GM/100ML-% SOLUTION: Performed by: ORTHOPAEDIC SURGERY

## 2021-11-24 RX ORDER — HYDRALAZINE HYDROCHLORIDE 20 MG/ML
5 INJECTION INTRAMUSCULAR; INTRAVENOUS
Status: DISCONTINUED | OUTPATIENT
Start: 2021-11-24 | End: 2021-11-24 | Stop reason: HOSPADM

## 2021-11-24 RX ORDER — NALOXONE HCL 0.4 MG/ML
0.2 VIAL (ML) INJECTION AS NEEDED
Status: DISCONTINUED | OUTPATIENT
Start: 2021-11-24 | End: 2021-11-24 | Stop reason: HOSPADM

## 2021-11-24 RX ORDER — PROPOFOL 10 MG/ML
VIAL (ML) INTRAVENOUS AS NEEDED
Status: DISCONTINUED | OUTPATIENT
Start: 2021-11-24 | End: 2021-11-24 | Stop reason: SURG

## 2021-11-24 RX ORDER — HYDROMORPHONE HYDROCHLORIDE 1 MG/ML
0.5 INJECTION, SOLUTION INTRAMUSCULAR; INTRAVENOUS; SUBCUTANEOUS
Status: DISCONTINUED | OUTPATIENT
Start: 2021-11-24 | End: 2021-11-24 | Stop reason: HOSPADM

## 2021-11-24 RX ORDER — LABETALOL HYDROCHLORIDE 5 MG/ML
5 INJECTION, SOLUTION INTRAVENOUS
Status: DISCONTINUED | OUTPATIENT
Start: 2021-11-24 | End: 2021-11-24 | Stop reason: HOSPADM

## 2021-11-24 RX ORDER — PROMETHAZINE HYDROCHLORIDE 25 MG/1
25 SUPPOSITORY RECTAL ONCE AS NEEDED
Status: DISCONTINUED | OUTPATIENT
Start: 2021-11-24 | End: 2021-11-24 | Stop reason: HOSPADM

## 2021-11-24 RX ORDER — ROCURONIUM BROMIDE 10 MG/ML
INJECTION, SOLUTION INTRAVENOUS AS NEEDED
Status: DISCONTINUED | OUTPATIENT
Start: 2021-11-24 | End: 2021-11-24 | Stop reason: SURG

## 2021-11-24 RX ORDER — MIDAZOLAM HYDROCHLORIDE 1 MG/ML
INJECTION INTRAMUSCULAR; INTRAVENOUS
Status: COMPLETED | OUTPATIENT
Start: 2021-11-24 | End: 2021-11-24

## 2021-11-24 RX ORDER — FENTANYL CITRATE 50 UG/ML
50 INJECTION, SOLUTION INTRAMUSCULAR; INTRAVENOUS
Status: DISCONTINUED | OUTPATIENT
Start: 2021-11-24 | End: 2021-11-24 | Stop reason: HOSPADM

## 2021-11-24 RX ORDER — SODIUM CHLORIDE 0.9 % (FLUSH) 0.9 %
3 SYRINGE (ML) INJECTION EVERY 12 HOURS SCHEDULED
Status: DISCONTINUED | OUTPATIENT
Start: 2021-11-24 | End: 2021-11-24 | Stop reason: HOSPADM

## 2021-11-24 RX ORDER — EPHEDRINE SULFATE 50 MG/ML
5 INJECTION, SOLUTION INTRAVENOUS ONCE AS NEEDED
Status: DISCONTINUED | OUTPATIENT
Start: 2021-11-24 | End: 2021-11-24 | Stop reason: HOSPADM

## 2021-11-24 RX ORDER — ONDANSETRON 4 MG/1
4 TABLET, FILM COATED ORAL EVERY 8 HOURS PRN
Qty: 20 TABLET | Refills: 0 | Status: SHIPPED | OUTPATIENT
Start: 2021-11-24

## 2021-11-24 RX ORDER — FLUMAZENIL 0.1 MG/ML
0.2 INJECTION INTRAVENOUS AS NEEDED
Status: DISCONTINUED | OUTPATIENT
Start: 2021-11-24 | End: 2021-11-24 | Stop reason: HOSPADM

## 2021-11-24 RX ORDER — SODIUM CHLORIDE, SODIUM LACTATE, POTASSIUM CHLORIDE, CALCIUM CHLORIDE 600; 310; 30; 20 MG/100ML; MG/100ML; MG/100ML; MG/100ML
9 INJECTION, SOLUTION INTRAVENOUS CONTINUOUS
Status: DISCONTINUED | OUTPATIENT
Start: 2021-11-24 | End: 2021-11-24 | Stop reason: HOSPADM

## 2021-11-24 RX ORDER — IBUPROFEN 600 MG/1
600 TABLET ORAL ONCE AS NEEDED
Status: DISCONTINUED | OUTPATIENT
Start: 2021-11-24 | End: 2021-11-24 | Stop reason: HOSPADM

## 2021-11-24 RX ORDER — LIDOCAINE HYDROCHLORIDE 20 MG/ML
INJECTION, SOLUTION INFILTRATION; PERINEURAL AS NEEDED
Status: DISCONTINUED | OUTPATIENT
Start: 2021-11-24 | End: 2021-11-24 | Stop reason: SURG

## 2021-11-24 RX ORDER — HYDROCODONE BITARTRATE AND ACETAMINOPHEN 5; 325 MG/1; MG/1
1 TABLET ORAL EVERY 4 HOURS PRN
Qty: 50 TABLET | Refills: 0 | Status: SHIPPED | OUTPATIENT
Start: 2021-11-24 | End: 2022-02-21 | Stop reason: SDDI

## 2021-11-24 RX ORDER — DIPHENHYDRAMINE HCL 25 MG
25 CAPSULE ORAL
Status: DISCONTINUED | OUTPATIENT
Start: 2021-11-24 | End: 2021-11-24 | Stop reason: HOSPADM

## 2021-11-24 RX ORDER — DEXAMETHASONE SODIUM PHOSPHATE 4 MG/ML
INJECTION, SOLUTION INTRA-ARTICULAR; INTRALESIONAL; INTRAMUSCULAR; INTRAVENOUS; SOFT TISSUE AS NEEDED
Status: DISCONTINUED | OUTPATIENT
Start: 2021-11-24 | End: 2021-11-24 | Stop reason: SURG

## 2021-11-24 RX ORDER — CEFAZOLIN SODIUM 2 G/100ML
2 INJECTION, SOLUTION INTRAVENOUS ONCE
Status: COMPLETED | OUTPATIENT
Start: 2021-11-24 | End: 2021-11-24

## 2021-11-24 RX ORDER — HYDROCODONE BITARTRATE AND ACETAMINOPHEN 7.5; 325 MG/1; MG/1
1 TABLET ORAL ONCE AS NEEDED
Status: DISCONTINUED | OUTPATIENT
Start: 2021-11-24 | End: 2021-11-24 | Stop reason: HOSPADM

## 2021-11-24 RX ORDER — LIDOCAINE HYDROCHLORIDE 10 MG/ML
0.5 INJECTION, SOLUTION EPIDURAL; INFILTRATION; INTRACAUDAL; PERINEURAL ONCE AS NEEDED
Status: DISCONTINUED | OUTPATIENT
Start: 2021-11-24 | End: 2021-11-24 | Stop reason: HOSPADM

## 2021-11-24 RX ORDER — PROMETHAZINE HYDROCHLORIDE 25 MG/1
25 TABLET ORAL ONCE AS NEEDED
Status: DISCONTINUED | OUTPATIENT
Start: 2021-11-24 | End: 2021-11-24 | Stop reason: HOSPADM

## 2021-11-24 RX ORDER — FENTANYL CITRATE 50 UG/ML
INJECTION, SOLUTION INTRAMUSCULAR; INTRAVENOUS
Status: COMPLETED | OUTPATIENT
Start: 2021-11-24 | End: 2021-11-24

## 2021-11-24 RX ORDER — FAMOTIDINE 10 MG/ML
20 INJECTION, SOLUTION INTRAVENOUS ONCE
Status: COMPLETED | OUTPATIENT
Start: 2021-11-24 | End: 2021-11-24

## 2021-11-24 RX ORDER — PHENYLEPHRINE HYDROCHLORIDE 10 MG/ML
INJECTION INTRAVENOUS AS NEEDED
Status: DISCONTINUED | OUTPATIENT
Start: 2021-11-24 | End: 2021-11-24 | Stop reason: SURG

## 2021-11-24 RX ORDER — DIPHENHYDRAMINE HYDROCHLORIDE 50 MG/ML
12.5 INJECTION INTRAMUSCULAR; INTRAVENOUS
Status: DISCONTINUED | OUTPATIENT
Start: 2021-11-24 | End: 2021-11-24 | Stop reason: HOSPADM

## 2021-11-24 RX ORDER — MIDAZOLAM HYDROCHLORIDE 1 MG/ML
1 INJECTION INTRAMUSCULAR; INTRAVENOUS
Status: DISCONTINUED | OUTPATIENT
Start: 2021-11-24 | End: 2021-11-24 | Stop reason: HOSPADM

## 2021-11-24 RX ORDER — SODIUM CHLORIDE 0.9 % (FLUSH) 0.9 %
3-10 SYRINGE (ML) INJECTION AS NEEDED
Status: DISCONTINUED | OUTPATIENT
Start: 2021-11-24 | End: 2021-11-24 | Stop reason: HOSPADM

## 2021-11-24 RX ORDER — BUPIVACAINE HYDROCHLORIDE 2.5 MG/ML
INJECTION, SOLUTION EPIDURAL; INFILTRATION; INTRACAUDAL
Status: COMPLETED | OUTPATIENT
Start: 2021-11-24 | End: 2021-11-24

## 2021-11-24 RX ORDER — DROPERIDOL 2.5 MG/ML
0.62 INJECTION, SOLUTION INTRAMUSCULAR; INTRAVENOUS ONCE
Status: DISCONTINUED | OUTPATIENT
Start: 2021-11-24 | End: 2021-11-24 | Stop reason: HOSPADM

## 2021-11-24 RX ORDER — ONDANSETRON 2 MG/ML
INJECTION INTRAMUSCULAR; INTRAVENOUS AS NEEDED
Status: DISCONTINUED | OUTPATIENT
Start: 2021-11-24 | End: 2021-11-24 | Stop reason: SURG

## 2021-11-24 RX ORDER — ONDANSETRON 2 MG/ML
4 INJECTION INTRAMUSCULAR; INTRAVENOUS ONCE AS NEEDED
Status: DISCONTINUED | OUTPATIENT
Start: 2021-11-24 | End: 2021-11-24 | Stop reason: HOSPADM

## 2021-11-24 RX ADMIN — FENTANYL CITRATE 50 MCG: 50 INJECTION INTRAMUSCULAR; INTRAVENOUS at 11:48

## 2021-11-24 RX ADMIN — CEFAZOLIN SODIUM 2 G: 2 INJECTION, SOLUTION INTRAVENOUS at 09:57

## 2021-11-24 RX ADMIN — BUPIVACAINE 10 ML: 13.3 INJECTION, SUSPENSION, LIPOSOMAL INFILTRATION at 08:51

## 2021-11-24 RX ADMIN — MIDAZOLAM 2 MG: 1 INJECTION INTRAMUSCULAR; INTRAVENOUS at 08:37

## 2021-11-24 RX ADMIN — LIDOCAINE HYDROCHLORIDE 100 MG: 20 INJECTION, SOLUTION INFILTRATION; PERINEURAL at 10:11

## 2021-11-24 RX ADMIN — PHENYLEPHRINE HYDROCHLORIDE 200 MCG: 10 INJECTION, SOLUTION INTRAVENOUS at 10:45

## 2021-11-24 RX ADMIN — BUPIVACAINE HYDROCHLORIDE 20 ML: 2.5 INJECTION, SOLUTION EPIDURAL; INFILTRATION; INTRACAUDAL; PERINEURAL at 08:51

## 2021-11-24 RX ADMIN — DEXAMETHASONE SODIUM PHOSPHATE 8 MG: 4 INJECTION, SOLUTION INTRAMUSCULAR; INTRAVENOUS at 10:33

## 2021-11-24 RX ADMIN — PROPOFOL 200 MG: 10 INJECTION, EMULSION INTRAVENOUS at 10:11

## 2021-11-24 RX ADMIN — PHENYLEPHRINE HYDROCHLORIDE 200 MCG: 10 INJECTION, SOLUTION INTRAVENOUS at 10:28

## 2021-11-24 RX ADMIN — FAMOTIDINE 20 MG: 10 INJECTION INTRAVENOUS at 08:36

## 2021-11-24 RX ADMIN — SUGAMMADEX 200 MG: 100 INJECTION, SOLUTION INTRAVENOUS at 10:46

## 2021-11-24 RX ADMIN — SODIUM CHLORIDE, POTASSIUM CHLORIDE, SODIUM LACTATE AND CALCIUM CHLORIDE 9 ML/HR: 600; 310; 30; 20 INJECTION, SOLUTION INTRAVENOUS at 08:36

## 2021-11-24 RX ADMIN — PHENYLEPHRINE HYDROCHLORIDE 200 MCG: 10 INJECTION, SOLUTION INTRAVENOUS at 10:37

## 2021-11-24 RX ADMIN — ROCURONIUM BROMIDE 50 MG: 50 INJECTION INTRAVENOUS at 10:12

## 2021-11-24 RX ADMIN — FENTANYL CITRATE 50 MCG: 50 INJECTION INTRAMUSCULAR; INTRAVENOUS at 08:44

## 2021-11-24 RX ADMIN — ONDANSETRON 4 MG: 2 INJECTION INTRAMUSCULAR; INTRAVENOUS at 10:47

## 2021-11-24 NOTE — ANESTHESIA PROCEDURE NOTES
Airway  Urgency: elective    Date/Time: 11/24/2021 10:14 AM  Airway not difficult    General Information and Staff    Patient location during procedure: OR  Anesthesiologist: Khang Julien MD  CRNA: Pily Cueva CRNA    Indications and Patient Condition  Indications for airway management: airway protection    Preoxygenated: yes  Mask difficulty assessment: 2 - vent by mask + OA or adjuvant +/- NMBA    Final Airway Details  Final airway type: endotracheal airway      Successful airway: ETT  Cuffed: yes   Successful intubation technique: direct laryngoscopy  Facilitating devices/methods: cricoid pressure  Endotracheal tube insertion site: oral  Blade: Larry  Blade size: 2  ETT size (mm): 7.5  Cormack-Lehane Classification: grade IIa - partial view of glottis  Placement verified by: chest auscultation and capnometry   Cuff volume (mL): 5  Measured from: lips  ETT/EBT  to lips (cm): 22  Number of attempts at approach: 1  Assessment: lips, teeth, and gum same as pre-op    Additional Comments  EBBS: ETCO2+: Atraumatic intubation

## 2021-11-24 NOTE — H&P
History & Physical       Patient: Colby Duenas    Date of Admission: No admission date for patient encounter.    YOB: 1975    Medical Record Number: [id number]    Attending Physician: Radha Caruso MD        Chief Complaints: Impingement syndrome of right shoulder [M75.41]      History of Present Illness: This patient is several-month history of right shoulder pain.  He is status post decompression 7 or 8 years ago by another physician.  He has an MRI which shows some tendinosis of the rotator cuff.  He has failed conservative management and presents for arthroscopy  Persistent right shoulder pain despite conservative measures he is failed all the conservative management he is an MRI which shows some tendinosis.  Plan is to proceed with arthroscopy would also discussed with him possibility of decompression.  I discussed in detail other possibilities that could be found at time of surgery including a more significant rotator cuff injury requiring rotator cuff repair.  Also talked about the potential for labral injury requiring debridement or repair as well as addressing the biceps tendon with debridement tenotomy or tenodesis.  I discussed all these in detail with the patient he understands and agrees to proceed we also talked about risk benefits and alternatives  The patient voiced understanding of the risks, benefits, and alternative forms of treatment that were discussed and the patient consents to proceed with the above listed surgery.  All risks, benefits and alternatives were discussed.  Risks including to but not exclusive to anesthetic complications, including death, MI, CVA, infection, bleeding DVT, fracture, residual pain and need for future surgery.      Allergies:   Allergies   Allergen Reactions   • Oxycodone-Acetaminophen Hives       Medications:   Home Medications:  No current facility-administered medications on file prior to encounter.     Current Outpatient Medications on  File Prior to Encounter   Medication Sig   • dicyclomine (BENTYL) 20 MG tablet Take 1 tablet by mouth 3 (Three) Times a Day As Needed (IBS).   • eletriptan (RELPAX) 20 MG tablet Take 1 tablet by mouth 1 (One) Time As Needed for Migraine for up to 1 dose. may repeat in 2 hours if necessary   • Erenumab-aooe (Aimovig) 140 MG/ML prefilled syringe Inject 1 mL under the skin into the appropriate area as directed Every 30 (Thirty) Days. For migraines   • FIBER COMPLETE tablet Take 1 tablet by mouth 2 (Two) Times a Day.   • fluticasone (FLONASE) 50 MCG/ACT nasal spray 1 spray into the nostril(s) as directed by provider Daily As Needed.   • gabapentin (NEURONTIN) 300 MG capsule Take 300 mg by mouth 3 (Three) Times a Day.   • melatonin 3 MG tablet Take  by mouth Every Night. WITH CBD OIL/PT HOLDING FOR SURGERY   • metoprolol succinate XL (TOPROL-XL) 50 MG 24 hr tablet Take 1 tablet by mouth Daily. For heart rate (Patient taking differently: Take 50 mg by mouth Every Morning. For heart rate)   • montelukast (SINGULAIR) 10 MG tablet Take 1 tablet by mouth Every Night.   • ondansetron ODT (ZOFRAN-ODT) 4 MG disintegrating tablet Place 4 mg on the tongue Every 8 (Eight) Hours As Needed.   • pantoprazole (PROTONIX) 20 MG EC tablet Take 1 tablet by mouth Daily. For GERD (Patient taking differently: Take 20 mg by mouth Every Morning. For GERD)     Current Medications:  Scheduled Meds:  Continuous Infusions:No current facility-administered medications for this encounter.    PRN Meds:.    Past Medical History:   Diagnosis Date   • Allergic    • Anxiety    • Arthritis    • Dislocation, shoulder 6/96   • Fracture, clavicle 2/86   • GERD (gastroesophageal reflux disease)    • History of colon polyps    • IBS (irritable bowel syndrome)    • Knee swelling 10/94   • Low back strain 7/92   • Migraines    • Periarthritis of shoulder 4/2014   • PSVT (paroxysmal supraventricular tachycardia) ([facility])    • Seasonal allergies    • Shoulder pain    •  Sleep apnea     CPAP   • Stress fracture    • Tear of meniscus of knee    • Tendinitis of knee         Past Surgical History:   Procedure Laterality Date   • CARDIAC CATHETERIZATION     • COLONOSCOPY     • HAND SURGERY     • HERNIA REPAIR     • INGUINAL HERNIA REPAIR      X3   • SHOULDER ACROMIOCLAVICULAR JOINT REPAIR Right    • SHOULDER SURGERY Right 2014   • TRIGGER POINT INJECTION  Multiple        Social History     Occupational History   • Not on file   Tobacco Use   • Smoking status: Former Smoker     Packs/day: 2.00     Years: 15.00     Pack years: 30.00     Types: Cigarettes     Start date: 7/10/1990     Quit date: 2017     Years since quittin.4   • Smokeless tobacco: Former User     Types: Chew     Quit date: 2014   Vaping Use   • Vaping Use: Never used   Substance and Sexual Activity   • Alcohol use: Not Currently     Alcohol/week: 0.0 standard drinks     Comment: Sober since 2018   • Drug use: Never   • Sexual activity: Yes     Partners: Female     Birth control/protection: None      Social History     Social History Narrative   • Not on file        Family History   Problem Relation Age of Onset   • Hypertension Mother    • Arthritis Mother    • Hypertension Father    • Malig Hyperthermia Neg Hx        Review of Systems      Physical Exam: 46 y.o. male  General Appearance:    Alert, cooperative, in no acute distress                    There were no vitals filed for this visit.     Head:  Normocephalic, without obvious abnormality, atraumatic   Eyes:          Conjunctivae and sclerae normal, no pallor, corneas clear,    Ears:  Ears appear intact with no abnormalities noted   Throat: No oral lesions, no thrush, oral mucosa moist   Neck: No adenopathy, supple, trachea midline, no thyromegaly,    Back:   No kyphosis present, no scoliosis present, no skin lesions,      erythema or scars, no tenderness to percussion or                   palpation,range of motion normal    Lungs:   Clear to auscultation, respirations regular, even and                 unlabored    Heart:  Regular rhythm and normal rate               Chest Wall:  No abnormalities observed   Abdomen:   Normal bowel sounds, no masses, no organomegaly, soft    nontender, nondistended, no guarding, no rebound   tenderness   Rectal:   Deferred   Extremities:  Moves all extremities well, no edema,   no cyanosis, no redness   Pulses: Pulses palpable and equal bilaterally   Skin: No bleeding, bruising or rash   Lymph nodes: No palpable adenopathy   Neurologic: Appears neurologic intact             Assessment:  Patient Active Problem List   Diagnosis   • Change in blood platelet count   • Idiopathic insomnia   • Cutaneous eruption   • Acute post-traumatic neurosis   • LEN (obstructive sleep apnea)   • BP (high blood pressure)   • Fatigue   • Chronic mixed headache syndrome   • Allergic rhinitis   • Diarrhea   • Encounter for loop recorder at end of battery life   • Excessive daytime sleepiness   • History of multiple concussions   • Irritable bowel syndrome with diarrhea   • Impingement syndrome of right shoulder   • S/P bilateral inguinal hernia repair   • Sleep paralysis   • Vasovagal syncope   • PTSD (post-traumatic stress disorder)   • Allergic rhinitis   • LEN on CPAP   • Migraine headache without aura   • PSVT (paroxysmal supraventricular tachycardia) ([facility])   • Hyperlipidemia, mixed   • Gastroesophageal reflux disease without esophagitis           Plan: All risks, benefits and alternatives were discussed.  Risks including but not exclusive to anesthetic complications, including death, MI, CVA, infection, bleeding DVT, PE,  fracture, residual pain and need for future surgery.  Patient understood all and agrees to proceed.

## 2021-11-24 NOTE — OP NOTE
Shoulder Scope Decompression Operative Note      Facility: [facility]  Patient Name: Colby Duenas  YOB: 1975  Date: 11/24/2021  Medical Record Number: [id number]      Pre-op Diagnosis:   Impingement syndrome of right shoulder [M75.41]    Post-Op Diagnosis Codes:     * Impingement syndrome of right shoulder [M75.41]  Partial bursal sided tear of the rotator cuff    Procedure(s):  Right SHOULDER ARTHROSCOPY WITH SUBACROMIAL DECOMPRESSION, DEBRIDEMENT  BURSAL SIDED ROTATOR CUFF    Surgeon(s):  Radha Caruso MD    Anesthesia: General with Block  Anesthesiologist: Khang Julien MD  CRNA: Pily Cueva CRNA    Staff:   Circulator: Deidre Wild RN  Scrub Person: Stacy Calderon  Assistant: Yu Amado first assist   first assist was used for proper positioning the patient, proper positioning of the extremity during the surgery itself and assistance with the scope in other instruments       Estimated Blood Loss: 10 mL    Specimens:   * No orders in the log *     Drains: None    Findings: See Dictation    Complications: None    Indication for procedure:     This patient has had a several month history of right shoulder pain which has been unresponsive to conservative management. They have an MRI and an exam which are consistent with impingement and a presents for arthroscopy. The patient understands all risks benefits and alternatives. Risks including but not exclusive to anesthetic complications including death, MI, CVA, as well as infection, bleeding, failure to relieve symptoms and need for future surgery. The patient understands these and agrees to proceed.      Description of procedure:       Patient was taken to the operating room. They were placed supine on the operating room table. After induction of adequate LMA anesthesia, scalene nerve block and IV antibiotics, they underwent exam under anesthesia which demonstrated  full range of motion which was  symmetric side to side. They were placed in the modified beachchair position all prominent areas well padded and head well stabilized. The arm was prepped and draped in the usual sterile fashion. Bony landmarks were demarcated and the joint was infiltrated with 30 cc of fluid. Standard posterior portal was made inferior and medial to the posterior H acromion with a 11 blade. Blunt trocar penetrated into the joint, scope followed and our evaluation began. This shoulder joint itself looked fine with regard to the biceps tendon subscapularis the rotator cuff looked normal. The articular cartilage was normal    I then exited the space entered the subacromial space. I made accessory lateral portal off the anterior lateral edge of the acromion placed the shaver and removed marked amount of thickened bursa. I delineated the anterior lateral edge of the acromion with the Apollo device and resected the large spur that was present. The rotator cuff was found to have some tendinopathy and a partial tear on the bursal side this was debrided with a motorized shaver and the Apollo device was not a significant amount of a tear and did not need to be repaired everything was thoroughly irrigated, it was suctioned, the portals were closed with 3-0 nylon in interrupted fashion. Sterile dressings and a sling were applied. The patient tolerated this well, was taken to recovery room in good condition all sponge and needle counts were correct.      Date: 11/24/2021  Time: 11:41 EST

## 2021-11-24 NOTE — ANESTHESIA PROCEDURE NOTES
Peripheral Block    Pre-sedation assessment completed: 11/24/2021 8:35 AM    Patient reassessed immediately prior to procedure    Patient location during procedure: pre-op  Start time: 11/24/2021 8:42 AM  Stop time: 11/24/2021 8:51 AM  Reason for block: at surgeon's request and post-op pain management  Performed by  Anesthesiologist: Khang Julien MD  Preanesthetic Checklist  Completed: patient identified, IV checked, site marked, risks and benefits discussed, surgical consent, monitors and equipment checked, pre-op evaluation and timeout performed  Prep:  Pt Position: supine  Sterile barriers:cap, gloves, mask and washed/disinfected hands  Prep: ChloraPrep  Patient monitoring: blood pressure monitoring, continuous pulse oximetry and EKG  Procedure    Sedation: yes  Performed under: local infiltration  Guidance:ultrasound guided    ULTRASOUND INTERPRETATION. Using ultrasound guidance a gauge needle was placed in close proximity to the brachial plexus nerve, at which point, under ultrasound guidance anesthetic was injected in the area of the nerve and spread of the anesthesia was seen on ultrasound in close proximity thereto.  There were no abnormalities seen on ultrasound; a digital image was taken; and the patient tolerated the procedure with no complications. Images:still images obtained    Laterality:right  Block Type:interscalene  Injection Technique:single-shot  Needle Type:echogenic and short-bevel  Needle Gauge:21 G  Resistance on Injection: none    Medications Used: bupivacaine liposome (EXPAREL) 1.3 % injection, 10 mL  bupivacaine PF (MARCAINE) 0.25 % injection, 20 mL  Med administered at 11/24/2021 8:51 AM      Post Assessment  Injection Assessment: negative aspiration for heme, no paresthesia on injection and incremental injection  Patient Tolerance:comfortable throughout block  Complications:no  Additional Notes  Ultrasound guidance used for location of target, insertion of needle, injection of  medication, and placement confirmation.

## 2021-11-24 NOTE — ANESTHESIA POSTPROCEDURE EVALUATION
Patient: Colby Duenas    Procedure Summary     Date: 11/24/21 Room / Location:  [facility]    Anesthesia Start: 0957 Anesthesia Stop: 1112    Procedure: SHOULDER ARTHROSCOPY WITH SUBACROMIAL DECOMPRESSION, DEBRIDEMENT  BURSAL SIDED ROTATOR CUFF (Right Shoulder) Diagnosis:       Impingement syndrome of right shoulder      (Impingement syndrome of right shoulder [M75.41])    Surgeons: Radha Caruso MD Provider: Khang Julien MD    Anesthesia Type: general with block ASA Status: 3          Anesthesia Type: general with block    Vitals  Vitals Value Taken Time   /90 11/24/21 1201   Temp 36.6 °C (97.8 °F) 11/24/21 1200   Pulse 76 11/24/21 1206   Resp 16 11/24/21 1200   SpO2 93 % 11/24/21 1207   Vitals shown include unvalidated device data.        Post Anesthesia Care and Evaluation    Patient location during evaluation: bedside  Patient participation: complete - patient participated  Level of consciousness: awake and alert  Pain management: adequate  Airway patency: patent  Anesthetic complications: No anesthetic complications    Cardiovascular status: acceptable  Respiratory status: acceptable  Hydration status: acceptable    Comments: /90 (BP Location: Left arm, Patient Position: Lying)   Pulse 72   Temp 36.6 °C (97.8 °F) (Temporal)   Resp 16   SpO2 94%

## 2021-11-24 NOTE — ANESTHESIA PREPROCEDURE EVALUATION
Anesthesia Evaluation     Patient summary reviewed   NPO Solid Status: > 8 hours  NPO Liquid Status: > 2 hours           Airway   Mallampati: I  TM distance: >3 FB  Neck ROM: full  Dental          Pulmonary     breath sounds clear to auscultation  (+) a smoker Former, sleep apnea on CPAP,   (-) shortness of breath  Cardiovascular   Exercise tolerance: good (4-7 METS)    Patient on routine beta blocker and Beta blocker given within 24 hours of surgery  Rhythm: regular  Rate: normal    (+) hypertension, dysrhythmias (paroxysmal supraventricular tachycardia), hyperlipidemia,   (-) angina, JONES      Neuro/Psych  (+) headaches, syncope, psychiatric history,     GI/Hepatic/Renal/Endo    (+)  GERD,      Musculoskeletal     Abdominal    Substance History   (-) alcohol use (sober x 3 yrs)     OB/GYN          Other   arthritis,                      Anesthesia Plan    ASA 3     general with block   (Nerve block(s) requested by surgeon for post-op pain control. Risks, benefits, and alternatives discussed with patient or patient representative who agrees to proceed with plan.   )  intravenous induction     Anesthetic plan, all risks, benefits, and alternatives have been provided, discussed and informed consent has been obtained with: patient.

## 2021-11-30 ENCOUNTER — OFFICE VISIT (OUTPATIENT)
Dept: ORTHOPEDIC SURGERY | Facility: CLINIC | Age: 46
End: 2021-11-30

## 2021-11-30 VITALS — BODY MASS INDEX: 29.55 KG/M2 | HEIGHT: 68 IN | TEMPERATURE: 98 F | WEIGHT: 195 LBS

## 2021-11-30 DIAGNOSIS — Z98.890 S/P ARTHROSCOPY OF SHOULDER: Primary | ICD-10-CM

## 2021-11-30 PROCEDURE — 99024 POSTOP FOLLOW-UP VISIT: CPT | Performed by: ORTHOPAEDIC SURGERY

## 2021-12-02 ENCOUNTER — TELEPHONE (OUTPATIENT)
Dept: ORTHOPEDIC SURGERY | Facility: CLINIC | Age: 46
End: 2021-12-02

## 2021-12-02 NOTE — TELEPHONE ENCOUNTER
Caller: LUKE    Relationship: [facility]    Best call back number: [phone]    What form or medical record are you requesting: OP REPORT / RT SHOULDER    Who is requesting this form or medical record from you: [facility]    How would you like to receive the form or medical records (pick-up, mail, fax):   FAX: [phone]      Timeframe paperwork needed: AS SOON AS POSSIBLE

## 2022-01-04 ENCOUNTER — OFFICE VISIT (OUTPATIENT)
Dept: ORTHOPEDIC SURGERY | Facility: CLINIC | Age: 47
End: 2022-01-04

## 2022-01-04 VITALS — HEIGHT: 68 IN | TEMPERATURE: 98.1 F | BODY MASS INDEX: 28.79 KG/M2 | WEIGHT: 190 LBS

## 2022-01-04 DIAGNOSIS — Z09 SURGERY FOLLOW-UP: Primary | ICD-10-CM

## 2022-01-04 PROCEDURE — 20610 DRAIN/INJ JOINT/BURSA W/O US: CPT | Performed by: ORTHOPAEDIC SURGERY

## 2022-01-04 RX ADMIN — TRIAMCINOLONE ACETONIDE 40 MG: 40 INJECTION, SUSPENSION INTRA-ARTICULAR; INTRAMUSCULAR at 15:39

## 2022-01-04 NOTE — PROGRESS NOTES
Patient: Colby Duenas  YOB: 1975  Date of Service: 1/4/2022    Chief Complaints: Right shoulder pain status post shoulder arthroscopy follow-up    Subjective:    History of Present Illness: Pt is seen in the office today with complaints of right shoulder pain he is status post shoulder arthroscopy he states he continues to improve little bit achy still at night but is progressing he is.   back to work but did not do a lot of lifting. He states is his most very experienced in his shoulder is deftly better than it was before surgery      Allergies:   Allergies   Allergen Reactions   • Oxycodone-Acetaminophen Hives       Medications:   Home Medications:  Current Outpatient Medications on File Prior to Visit   Medication Sig   • dicyclomine (BENTYL) 20 MG tablet Take 1 tablet by mouth 3 (Three) Times a Day As Needed (IBS).   • eletriptan (RELPAX) 20 MG tablet Take 1 tablet by mouth 1 (One) Time As Needed for Migraine for up to 1 dose. may repeat in 2 hours if necessary   • Erenumab-aooe (Aimovig) 140 MG/ML prefilled syringe Inject 1 mL under the skin into the appropriate area as directed Every 30 (Thirty) Days. For migraines   • FIBER COMPLETE tablet Take 1 tablet by mouth 2 (Two) Times a Day.   • fluticasone (FLONASE) 50 MCG/ACT nasal spray 1 spray into the nostril(s) as directed by provider Daily As Needed.   • gabapentin (NEURONTIN) 300 MG capsule Take 300 mg by mouth 3 (Three) Times a Day.   • HYDROcodone-acetaminophen (NORCO) 5-325 MG per tablet Take 1 tablet by mouth Every 4 (Four) Hours As Needed for Severe Pain .   • melatonin 3 MG tablet Take  by mouth Every Night. WITH CBD OIL/PT HOLDING FOR SURGERY   • metoprolol succinate XL (TOPROL-XL) 50 MG 24 hr tablet Take 1 tablet by mouth Daily. For heart rate (Patient taking differently: Take 50 mg by mouth Every Morning. For heart rate)   • montelukast (SINGULAIR) 10 MG tablet Take 1 tablet by mouth Every Night.   • ondansetron (Zofran) 4 MG  tablet Take 1 tablet by mouth Every 8 (Eight) Hours As Needed for Nausea or Vomiting.   • ondansetron ODT (ZOFRAN-ODT) 4 MG disintegrating tablet Place 4 mg on the tongue Every 8 (Eight) Hours As Needed.   • pantoprazole (PROTONIX) 20 MG EC tablet Take 1 tablet by mouth Daily. For GERD (Patient taking differently: Take 20 mg by mouth Every Morning. For GERD)   • Peppermint Oil (IBGARD PO) Take 1 capsule by mouth 2 (Two) Times a Day. PT HOLDING FOR SURGERY   • Probiotic Product (ALIGN PO) Take 1 capsule by mouth Every Morning. PT HOLDING FOR SURGERY     No current facility-administered medications on file prior to visit.     Current Medications:  Scheduled Meds:  Continuous Infusions:No current facility-administered medications for this visit.    PRN Meds:.    I have reviewed the patient's medical history in detail and updated the computerized patient record.  Review and summarization of old records include:    Past Medical History:   Diagnosis Date   • Allergic    • Anxiety    • Arthritis    • Dislocation, shoulder 6/96   • Fracture, clavicle 2/86   • GERD (gastroesophageal reflux disease)    • History of colon polyps    • IBS (irritable bowel syndrome)    • Knee swelling 10/94   • Low back strain 7/92   • Migraines    • Periarthritis of shoulder 4/2014   • PSVT (paroxysmal supraventricular tachycardia) ([facility])    • Seasonal allergies    • Shoulder pain    • Sleep apnea     CPAP   • Stress fracture 2/95   • Tear of meniscus of knee 2/95   • Tendinitis of knee 2/95        Past Surgical History:   Procedure Laterality Date   • CARDIAC CATHETERIZATION     • COLONOSCOPY     • HAND SURGERY  11/90   • HERNIA REPAIR     • INGUINAL HERNIA REPAIR      X3   • SHOULDER ACROMIOCLAVICULAR JOINT REPAIR Right    • SHOULDER ARTHROSCOPY Right 11/24/2021    Procedure: SHOULDER ARTHROSCOPY WITH SUBACROMIAL DECOMPRESSION, DEBRIDEMENT  BURSAL SIDED ROTATOR CUFF;  Surgeon: Radha Caruso MD;  Location: [facility] OR [facility];  Service:  Orthopedics;  Laterality: Right;   • SHOULDER SURGERY Right 2014   • TRIGGER POINT INJECTION  Multiple        Social History     Occupational History   • Not on file   Tobacco Use   • Smoking status: Former Smoker     Packs/day: 2.00     Years: 15.00     Pack years: 30.00     Types: Cigarettes     Start date: 7/10/1990     Quit date: 2017     Years since quittin.5   • Smokeless tobacco: Former User     Types: Chew     Quit date: 2014   Vaping Use   • Vaping Use: Never used   Substance and Sexual Activity   • Alcohol use: Not Currently     Alcohol/week: 0.0 standard drinks     Comment: Sober since 2018   • Drug use: Never   • Sexual activity: Yes     Partners: Female     Birth control/protection: None      Social History     Social History Narrative   • Not on file        Family History   Problem Relation Age of Onset   • Hypertension Mother    • Arthritis Mother    • Hypertension Father    • Malig Hyperthermia Neg Hx        ROS: 14 point review of systems was performed and was negative except for documented findings in HPI and today's encounter.     Allergies:   Allergies   Allergen Reactions   • Oxycodone-Acetaminophen Hives     Constitutional:  Denies fever, shaking or chills   Eyes:  Denies change in visual acuity   HENT:  Denies nasal congestion or sore throat   Respiratory:  Denies cough or shortness of breath   Cardiovascular:  Denies chest pain or severe LE edema   GI:  Denies abdominal pain, nausea, vomiting, bloody stools or diarrhea   Musculoskeletal:  Numbness, tingling, or loss of motor function only as noted above in history of present illness.  : Denies painful urination or hematuria  Integument:  Denies rash, lesion or ulceration   Neurologic:  Denies headache or focal weakness  Endocrine:  Denies lymphadenopathy  Psych:  Denies confusion or change in mental status   Hem:  Denies active bleeding      Physical Exam: 46 y.o. male  Wt Readings from Last 3 Encounters:   21 88.5  kg (195 lb)   11/17/21 88 kg (194 lb)   10/25/21 86.2 kg (190 lb)       There is no height or weight on file to calculate BMI.  No height and weight on file for this encounter.  There were no vitals filed for this visit.  Vital signs reviewed.   General Appearance:    Alert, cooperative, in no acute distress                    Ortho exam    Physical exam of the right shoulder reveals no overlying skin changes no lymphedema no lymphadenopathy.  Patient has active flexion 180 with mild symptoms abduction is similar external rotation is to 50 and internal rotation to the upper lumbar spine with mild symptoms.  Patient has good rotator cuff strength 4+ over 5 with isometric strength testing with pain.  Patient has a positive impingement and a positive Moreno sign.  Patient has good cervical range of motion which is full and asymptomatic no radicular symptoms.  Patient has a normal elbow exam.  Good distal pulses are present  Patient has pain with overhead activity and a positive Neer sign and a positive empty can sign , a positive drop arm and a definitive painful arc         .time    Assessment: Status post right shoulder arthroscopy is doing well still having some night pain probably doing a little bit too much during the day headache an injection will hopefully calm this down which he was agreeable to do    Plan:   Follow up as indicated.  Ice, elevate, and rest as needed.  Discussed conservative measures of pain control including ice, bracing.  Also talked about the importance of strengthening and maintaining ideal body weight    Radha Caruso M.D.    Large Joint Arthrocentesis: R subacromial bursa  Date/Time: 1/4/2022 3:39 PM  Consent given by: patient  Site marked: site marked  Timeout: Immediately prior to procedure a time out was called to verify the correct patient, procedure, equipment, support staff and site/side marked as required   Supporting Documentation  Indications: pain   Procedure Details  Location:  shoulder - R subacromial bursa  Preparation: Patient was prepped and draped in the usual sterile fashion  Needle gauge: 21G.  Approach: posterior  Medications administered: 4 mL lidocaine (cardiac); 40 mg triamcinolone acetonide 40 MG/ML  Patient tolerance: patient tolerated the procedure well with no immediate complications

## 2022-01-05 RX ORDER — TRIAMCINOLONE ACETONIDE 40 MG/ML
40 INJECTION, SUSPENSION INTRA-ARTICULAR; INTRAMUSCULAR
Status: COMPLETED | OUTPATIENT
Start: 2022-01-04 | End: 2022-01-04

## 2022-02-21 ENCOUNTER — OFFICE VISIT (OUTPATIENT)
Dept: FAMILY MEDICINE CLINIC | Facility: CLINIC | Age: 47
End: 2022-02-21

## 2022-02-21 VITALS
HEIGHT: 68 IN | TEMPERATURE: 97.5 F | BODY MASS INDEX: 28.95 KG/M2 | DIASTOLIC BLOOD PRESSURE: 62 MMHG | RESPIRATION RATE: 16 BRPM | OXYGEN SATURATION: 97 % | WEIGHT: 191 LBS | HEART RATE: 78 BPM | SYSTOLIC BLOOD PRESSURE: 110 MMHG

## 2022-02-21 DIAGNOSIS — K21.9 GASTROESOPHAGEAL REFLUX DISEASE WITHOUT ESOPHAGITIS: ICD-10-CM

## 2022-02-21 DIAGNOSIS — R42 LIGHT HEADED: ICD-10-CM

## 2022-02-21 DIAGNOSIS — G47.33 OSA (OBSTRUCTIVE SLEEP APNEA): Primary | ICD-10-CM

## 2022-02-21 DIAGNOSIS — H53.8 BLURRED VISION: ICD-10-CM

## 2022-02-21 DIAGNOSIS — I47.1 PSVT (PAROXYSMAL SUPRAVENTRICULAR TACHYCARDIA): ICD-10-CM

## 2022-02-21 DIAGNOSIS — D69.6 THROMBOCYTOPENIA: ICD-10-CM

## 2022-02-21 DIAGNOSIS — E78.2 HYPERLIPIDEMIA, MIXED: ICD-10-CM

## 2022-02-21 DIAGNOSIS — K58.0 IRRITABLE BOWEL SYNDROME WITH DIARRHEA: ICD-10-CM

## 2022-02-21 DIAGNOSIS — E55.9 VITAMIN D DEFICIENCY: ICD-10-CM

## 2022-02-21 DIAGNOSIS — R41.3 MEMORY CHANGES: ICD-10-CM

## 2022-02-21 DIAGNOSIS — G43.109 MIGRAINE WITH AURA AND WITHOUT STATUS MIGRAINOSUS, NOT INTRACTABLE: ICD-10-CM

## 2022-02-21 DIAGNOSIS — G43.019 INTRACTABLE MIGRAINE WITHOUT AURA AND WITHOUT STATUS MIGRAINOSUS: ICD-10-CM

## 2022-02-21 PROCEDURE — 99214 OFFICE O/P EST MOD 30 MIN: CPT | Performed by: PHYSICIAN ASSISTANT

## 2022-02-21 NOTE — PROGRESS NOTES
"Subjective   Colby Duenas is a 46 y.o. male.     History of Present Illness    Since the last visit, he has overall felt tired.  He has GERD controlled on PPI Rx, Hyperlipidemia and working on this with diet and exercise, Vitamin D deficiency and labs are at goal >30 ng/mL, Migraine headaches and responding well to PRN triptan Rx and Migraine headaches and well controlled on suppression medication.  he has been compliant with current medications have reviewed them.  The patient denies medication side effects.  Will refill medications. /62 (BP Location: Left arm, Patient Position: Sitting, Cuff Size: Adult)   Pulse 78   Temp 97.5 °F (36.4 °C)   Resp 16   Ht 172.7 cm (67.99\")   Wt 86.6 kg (191 lb)   SpO2 97%   BMI 29.05 kg/m²     Results for orders placed or performed in visit on 11/23/21   COVID-19,APTIMA PANTHER(CHLOE),[facility], NP/OP SWAB IN UTM/VTM/[facility] TRANSPORT MEDIA,24 HR TAT - Swab, Nasopharynx    Specimen: Nasopharynx; Swab   Result Value Ref Range    COVID19 Not Detected Not Detected - Ref. Range         Has hx obst sleep apnea  Has been lightheaded, confusion, memory changes, issues with object---name recall.   .  Not using Cpap and sleeps only 3 hours a night.  6-23-21 brain MRI without acute changes----incidental notes.  No f/u noted  GERD Rx is daily---failed Zantac;  Bentyl for IBS works great PRN.  2013 AHA (American Heart Association) Cholesterol Risk Ratio Score Goal is <=7.5% and your score is:  2.15%  Episodes of blurred vision---need him to see DR Cordova---neuro for above  Need him to see sleep med.  Figure out if this is from lack of sleep?  Wants [facility] cardio    Prior notes: ---  On beta blocker for hx sinus tachycardia; hx syncope  Loop recorder----released from cardio-----DR Moreno   Echo stress test 12-23-16  Conclusions:   Global left ventricular wall motion and contractility are within normal   limits.   The EF 4ch was calculated at 58%.   Normal left ventricular diastolic " filling is observed.   Definity was used to optimize study.   Normal Sress Echo   Normal Stress EKG       Has been to neurology in the past and saw Dr. Cordova for migraines and hx concussions.  Last visit he reported that he was not taking his prevention medicine due to insurance.  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  .Sees Dr Han for psych;   He is also on Gabapentin ---this can make him lightheaded and memory issues.  Episodes off balance-----all onset worse 1 mo; onset 6 weeks ago.  On Aimovig for migraine prevention and works  GERD is controlled on PPI Rx.    The following portions of the patient's history were reviewed and updated as appropriate: allergies, current medications, past family history, past medical history, past social history, past surgical history and problem list.    Review of Systems   Constitutional: Positive for fatigue. Negative for activity change, appetite change and unexpected weight change.   HENT: Negative for nosebleeds and trouble swallowing.    Eyes: Negative for pain and visual disturbance.   Respiratory: Negative for chest tightness, shortness of breath and wheezing.    Cardiovascular: Negative for chest pain and palpitations.   Gastrointestinal: Negative for abdominal pain and blood in stool.   Endocrine: Negative.    Genitourinary: Negative for difficulty urinating and hematuria.   Musculoskeletal: Negative for joint swelling.   Skin: Negative for color change and rash.   Allergic/Immunologic: Negative.    Neurological: Positive for light-headedness. Negative for syncope and speech difficulty.   Hematological: Negative for adenopathy.   Psychiatric/Behavioral: Positive for confusion and decreased concentration. Negative for agitation.   All  other systems reviewed and are negative.      Objective   Physical Exam  Vitals and nursing note reviewed.   Constitutional:       General: He is not in acute distress.     Appearance: He is well-developed. He is not diaphoretic.   HENT:      Head: Normocephalic.      Right Ear: External ear normal.      Left Ear: External ear normal.   Eyes:      General: No scleral icterus.     Conjunctiva/sclera: Conjunctivae normal.      Pupils: Pupils are equal, round, and reactive to light.   Cardiovascular:      Rate and Rhythm: Normal rate and regular rhythm.      Heart sounds: Normal heart sounds. No murmur heard.      Pulmonary:      Effort: Pulmonary effort is normal.      Breath sounds: Normal breath sounds.   Musculoskeletal:         General: Normal range of motion.      Cervical back: Normal range of motion and neck supple.   Skin:     General: Skin is warm and dry.      Findings: No rash.   Neurological:      General: No focal deficit present.      Mental Status: He is alert and oriented to person, place, and time. Mental status is at baseline.      Coordination: Coordination normal.      Gait: Gait normal.   Psychiatric:         Mood and Affect: Mood normal. Affect is not inappropriate.         Behavior: Behavior normal.         Thought Content: Thought content normal.         Judgment: Judgment normal.         Assessment/Plan   Diagnoses and all orders for this visit:    1. LEN (obstructive sleep apnea) (Primary)  -     Ambulatory Referral to Sleep Medicine  -     Comprehensive metabolic panel  -     Lipid panel  -     CBC and Differential  -     TSH  -     Platelet Ct On Citrated Bld  -     Hemoglobin A1c  -     T3, Free  -     T4, Free  -     Vitamin B12  -     Folate  -     Vitamin D 25 Hydroxy    2. Hyperlipidemia, mixed  -     Comprehensive metabolic panel  -     Lipid panel  -     CBC and Differential  -     TSH  -     Platelet Ct On Citrated Bld  -     Hemoglobin A1c  -     T3, Free  -     T4, Free  -      Vitamin B12  -     Folate  -     Vitamin D 25 Hydroxy    3. Intractable migraine without aura and without status migrainosus  -     Comprehensive metabolic panel  -     Lipid panel  -     CBC and Differential  -     TSH  -     Platelet Ct On Citrated Bld  -     Hemoglobin A1c  -     T3, Free  -     T4, Free  -     Vitamin B12  -     Folate  -     Vitamin D 25 Hydroxy    4. Irritable bowel syndrome with diarrhea  -     Comprehensive metabolic panel  -     Lipid panel  -     CBC and Differential  -     TSH  -     Platelet Ct On Citrated Bld  -     Hemoglobin A1c  -     T3, Free  -     T4, Free  -     Vitamin B12  -     Folate  -     Vitamin D 25 Hydroxy    5. Gastroesophageal reflux disease without esophagitis  -     Comprehensive metabolic panel  -     Lipid panel  -     CBC and Differential  -     TSH  -     Platelet Ct On Citrated Bld  -     Hemoglobin A1c  -     T3, Free  -     T4, Free  -     Vitamin B12  -     Folate  -     Vitamin D 25 Hydroxy    6. PSVT (paroxysmal supraventricular tachycardia) ([facility])  -     Ambulatory Referral to Cardiology  -     Comprehensive metabolic panel  -     Lipid panel  -     CBC and Differential  -     TSH  -     Platelet Ct On Citrated Bld  -     Hemoglobin A1c  -     T3, Free  -     T4, Free  -     Vitamin B12  -     Folate  -     Vitamin D 25 Hydroxy    7. Thrombocytopenia ([facility])  -     Comprehensive metabolic panel  -     Lipid panel  -     CBC and Differential  -     TSH  -     Platelet Ct On Citrated Bld  -     Hemoglobin A1c  -     T3, Free  -     T4, Free  -     Vitamin B12  -     Folate  -     Vitamin D 25 Hydroxy    8. Blurred vision  -     Ambulatory Referral to Neurology    9. Light headed  -     Ambulatory Referral to Neurology    10. Memory changes  -     Ambulatory Referral to Neurology    11. Migraine with aura and without status migrainosus, not intractable  -     Ambulatory Referral to Neurology    12. Vitamin D deficiency    Also following up on platelet count with  citrated plt  Update labs  Plan, Colby Duenas, was seen today.  he was seen for Imparied fasting glucose and plan follow up labs, diet, and exercise, GERD and will continue on PPI medication, Hyperlipidemia and will work on this with diet and exercise, Vitamin D deficiency and will update labs , Migraine headaches and will continue with their PRN triptan and Migraine headaches and well controlled on their suppressive medication.  See cardio for PSVT hx; on beta blocker    GERD is controlled on PPI Rx.  See sleep med ---lightheaded; memory issues---sleep deprived?  Talk to Dr Han about Gabapentin and lightheaded; memory issues

## 2022-02-21 NOTE — PATIENT INSTRUCTIONS

## 2022-03-28 ENCOUNTER — OFFICE VISIT (OUTPATIENT)
Dept: ORTHOPEDIC SURGERY | Facility: CLINIC | Age: 47
End: 2022-03-28

## 2022-03-28 VITALS — BODY MASS INDEX: 29.55 KG/M2 | TEMPERATURE: 97.3 F | WEIGHT: 195 LBS | HEIGHT: 68 IN

## 2022-03-28 DIAGNOSIS — Z98.890 HISTORY OF ARTHROSCOPIC SURGERY OF SHOULDER: ICD-10-CM

## 2022-03-28 DIAGNOSIS — M75.01 ADHESIVE CAPSULITIS OF RIGHT SHOULDER: Primary | ICD-10-CM

## 2022-03-28 PROCEDURE — 99214 OFFICE O/P EST MOD 30 MIN: CPT | Performed by: NURSE PRACTITIONER

## 2022-03-28 PROCEDURE — 72040 X-RAY EXAM NECK SPINE 2-3 VW: CPT | Performed by: NURSE PRACTITIONER

## 2022-03-28 NOTE — PROGRESS NOTES
With this right [facility]              Follow Up      Patient Name: Colby Duenas  : 1975  Primary Care Physician: Mary Lopez, SATYA        Chief Complaint: Right shoulder pain    HPI:   Colby Duenas is a 47 y.o. year old who presents today for evaluation of right shoulder pain.  Patient has a history of chronic right shoulder pain and longstanding history with the shoulder.  It he most recently had subacromial decompression and debridement of a bursal sided rotator cuff tear with Dr. Caruso approximately 4 months ago.  He did pretty well after the surgery, he says he really had no complaints with the surgery but at a follow-up in January was having some pain.  In reviewing the note with Dr. Caruso it seems she felt he might have been overdoing it and they tried a subacromial injection as a means to control his symptoms.  The patient said he was really sore after that injection but it really helped, his shoulder felt pretty good but this only lasted about 4 weeks and then his pain comes back.  Patient says his shoulder at times hurts more now than it did before the procedure, he really does not attribute anything about the procedure to it he just feels there is something more going on there.  Patient did do some physical therapy after surgery but probably not quite as much as would be ideal.    He is very active, he works as an  but is doing more supervisory work.  Some of the days the pain in the shoulder is really tremendous other days it is not too bad.  There are times where certain motions are incredibly painful almost to the point where he has weakness and feels like he is going to drop things that he is holding.  He has been taking anti-inflammatories, doing stretching etc. all without much relief in his symptoms.      Past Medical/Surgical, Social and Family History:  I have reviewed and/or updated pertinent history as noted in the medical record including:  Past  Medical History:   Diagnosis Date   • Allergic    • Anxiety    • Arthritis    • Dislocation, shoulder    • Fracture, clavicle    • GERD (gastroesophageal reflux disease)    • History of colon polyps    • IBS (irritable bowel syndrome)    • Knee swelling 10/94   • Low back strain    • Migraines    • Periarthritis of shoulder 2014   • PSVT (paroxysmal supraventricular tachycardia) ([facility])    • Seasonal allergies    • Shoulder pain    • Sleep apnea     CPAP   • Stress fracture    • Tear of meniscus of knee    • Tendinitis of knee      Past Surgical History:   Procedure Laterality Date   • CARDIAC CATHETERIZATION     • COLONOSCOPY     • HAND SURGERY     • HERNIA REPAIR     • INGUINAL HERNIA REPAIR      X3   • SHOULDER ACROMIOCLAVICULAR JOINT REPAIR Right    • SHOULDER ARTHROSCOPY Right 2021    Procedure: SHOULDER ARTHROSCOPY WITH SUBACROMIAL DECOMPRESSION, DEBRIDEMENT  BURSAL SIDED ROTATOR CUFF;  Surgeon: Radha Caruso MD;  Location: [facility];  Service: Orthopedics;  Laterality: Right;   • SHOULDER SURGERY Right 2014   • TRIGGER POINT INJECTION  Multiple     Social History     Occupational History   • Not on file   Tobacco Use   • Smoking status: Former Smoker     Packs/day: 2.00     Years: 15.00     Pack years: 30.00     Types: Cigarettes     Start date: 7/10/1990     Quit date: 2017     Years since quittin.8   • Smokeless tobacco: Former User     Types: Chew     Quit date: 2014   Vaping Use   • Vaping Use: Never used   Substance and Sexual Activity   • Alcohol use: Not Currently     Alcohol/week: 0.0 standard drinks     Comment: Sober since 2018   • Drug use: Never   • Sexual activity: Yes     Partners: Female     Birth control/protection: None      Social History     Social History Narrative   • Not on file     Family History   Problem Relation Age of Onset   • Hypertension Mother    • Arthritis Mother    • Hypertension Father    • Malig Hyperthermia Neg  Hx        Allergies:   Allergies   Allergen Reactions   • Oxycodone-Acetaminophen Hives       Medications:   Home Medications:  Current Outpatient Medications on File Prior to Visit   Medication Sig   • dicyclomine (BENTYL) 20 MG tablet Take 1 tablet by mouth 3 (Three) Times a Day As Needed (IBS).   • eletriptan (RELPAX) 20 MG tablet Take 1 tablet by mouth 1 (One) Time As Needed for Migraine for up to 1 dose. may repeat in 2 hours if necessary   • Erenumab-aooe (Aimovig) 140 MG/ML prefilled syringe Inject 1 mL under the skin into the appropriate area as directed Every 30 (Thirty) Days. For migraines   • FIBER COMPLETE tablet Take 1 tablet by mouth 2 (Two) Times a Day.   • fluticasone (FLONASE) 50 MCG/ACT nasal spray 1 spray into the nostril(s) as directed by provider Daily As Needed.   • gabapentin (NEURONTIN) 300 MG capsule Take 300 mg by mouth 3 (Three) Times a Day.   • melatonin 3 MG tablet Take  by mouth Every Night. WITH CBD OIL/PT HOLDING FOR SURGERY   • metoprolol succinate XL (TOPROL-XL) 50 MG 24 hr tablet Take 1 tablet by mouth Daily. For heart rate (Patient taking differently: Take 50 mg by mouth Every Morning. For heart rate)   • montelukast (SINGULAIR) 10 MG tablet Take 1 tablet by mouth Every Night.   • ondansetron (Zofran) 4 MG tablet Take 1 tablet by mouth Every 8 (Eight) Hours As Needed for Nausea or Vomiting.   • ondansetron ODT (ZOFRAN-ODT) 4 MG disintegrating tablet Place 4 mg on the tongue Every 8 (Eight) Hours As Needed.   • pantoprazole (PROTONIX) 20 MG EC tablet Take 1 tablet by mouth Daily. For GERD (Patient taking differently: Take 20 mg by mouth Every Morning. For GERD)   • Peppermint Oil (IBGARD PO) Take 1 capsule by mouth 2 (Two) Times a Day. PT HOLDING FOR SURGERY   • Probiotic Product (ALIGN PO) Take 1 capsule by mouth Every Morning. PT HOLDING FOR SURGERY     No current facility-administered medications on file prior to visit.         ROS:  ROS negative except as listed in the  HPI.    Physical Exam:   47 y.o. male  Body mass index is 29.65 kg/m²., 88.5 kg (195 lb)  Vitals:    03/28/22 1010   Temp: 97.3 °F (36.3 °C)     General: Alert, cooperative, appears well and in no observable distress. Appears stated age and BMI as listed above.  HEENT: Normocephalic, atraumatic on external visual inspection.  CV: No significant peripheral edema.  Respiratory: Normal respiratory effort.  Skin: Warm & well perfused; appropriate skin turgor.  Psych: Appropriate mood & affect.  Neuro: Gross sensation and motor intact in affected extremity/extremities.  Vascular: Peripheral pulses palpable in affected extremity/extremities.     MSK Exam:  RIGHT Shoulder  No obvious deformity or wounds, crepitus : mild, tenderness:none, AROM:Flexion: 170; ER: 70; IR: lumbar spine, +firm endpoint in ROM, Cuff Strength: ER 4; IR 4+; Supraspinatus 4, uncomfortable with isometric testing and +impingement signs    LEFT Shoulder  No deformity or wounds.  No tenderness to palpation.  Full, painless AROM.  Cuff Strength 5/5 with ER, IR & Supraspinatus testing.  Negative provocative testing.      Cervical exam is grossly normal for patient's age. ROM is smooth and without pain.  No clear radicular pattern is reproduced on clinical exam.  Elbow exam reveals no abnormality with normal painless ROM.        Radiology:    The following X-rays were ordered/reviewed today to evaluate the patient's symptoms: Spine: AP and Lateral view of the cervical spine shows Slight loss of normal cervical lordosis, otherwise no acute bony pathology to account for reported symptoms.  Very mild degenerative changes but overall disc spaces appear well-preserved.. There are no prior films available for direct comparison.    Procedure:   See Procedure Note: The potential risks and benefits of performing a diagnostic and therapeutic injection were discussed with the patient prior to procedure. Risks include, but are not limited to infection, swelling,  transient increase in pain, bleeding, bruising. Patient was advised that injections are a diagnostic and therapeutic tool meaning they may not alleviate symptoms at all, or may only provide partial or temporary relief.       Misc. Data/Labs: N/A    Assessment & Plan:    This is a 47-year-old male with ongoing right shoulder pain.  He has a history of arthroscopy x2 in that shoulder, most recently with Dr. Caruso in November 2021.  He did well after the surgery but had a return in his pain symptoms and had a subacromial injection in January 2022.  Based on clinical exam today his presentation is a little concerning for an early frozen shoulder.  Cervical spine pathology is also in the differential however his presentation is a bit ambiguous.  He does not have any significant changes on his C-spine x-ray that clearly point me in that direction.    Plan is to proceed with a glenohumeral injection today, I discussed with the patient I would like for him to touch base with us in about a week.  If he is getting good relief then we might really have to stress physical therapy to recuperate his range of motion.  If he is not getting any additional relief I would consider pursuing additional means of testing or referring him back to Dr. Caruso and can consider working up his cervical spine.    Injection provided in the office today. Tolerated well with no immediate complications. Injection precautions discussed with the patient., Patient encouraged to call with questions or concerns prior to follow up. , Patient instructed on appropriate use of NSAIDs and signs/symptoms of adverse reactions. Patient advised to stop medications and notify the office in the event of any noted side effects.  and Patient instructed on use of ICE therapy PRN for pain and swelling.      ICD-10-CM ICD-9-CM   1. Adhesive capsulitis of right shoulder  M75.01 726.0   2. History of arthroscopic surgery of shoulder  Z98.890 V45.89     No orders of the  defined types were placed in this encounter.    Orders Placed This Encounter   Procedures   • XR Spine Cervical 2 View     Return in about 1 week (around 4/4/2022).    FORD Cancino      Dictated Utilizing Dragon Dictation

## 2022-04-13 ENCOUNTER — OFFICE VISIT (OUTPATIENT)
Dept: SLEEP MEDICINE | Facility: HOSPITAL | Age: 47
End: 2022-04-13

## 2022-04-13 VITALS
HEART RATE: 64 BPM | SYSTOLIC BLOOD PRESSURE: 109 MMHG | HEIGHT: 68 IN | DIASTOLIC BLOOD PRESSURE: 77 MMHG | WEIGHT: 187 LBS | BODY MASS INDEX: 28.34 KG/M2 | OXYGEN SATURATION: 98 %

## 2022-04-13 DIAGNOSIS — G47.8 NON-RESTORATIVE SLEEP: ICD-10-CM

## 2022-04-13 DIAGNOSIS — E66.3 OVERWEIGHT WITH BODY MASS INDEX (BMI) 25.0-29.9: ICD-10-CM

## 2022-04-13 DIAGNOSIS — G47.33 OSA (OBSTRUCTIVE SLEEP APNEA): Primary | ICD-10-CM

## 2022-04-13 DIAGNOSIS — G47.10 HYPERSOMNIA: ICD-10-CM

## 2022-04-13 PROCEDURE — G0463 HOSPITAL OUTPT CLINIC VISIT: HCPCS

## 2022-04-13 PROCEDURE — 99204 OFFICE O/P NEW MOD 45 MIN: CPT | Performed by: FAMILY MEDICINE

## 2022-04-13 NOTE — PROGRESS NOTES
[facility]ultation       Reason for Consultation: LEN      Patient Care Team:  Mary Lopez PA-C as PCP - General (Family Medicine)  Lei Gonsalez MD as PCP - Family Medicine  Demian Chowdary MD as Consulting Physician (Gastroenterology)  Lei Cordova DO as Consulting Physician (Neurology)  Lei Moreno MD as Consulting Physician (Cardiac Electrophysiology)  Tony Mccain MD as Consulting Physician (Pulmonary Disease)  Jose Han MD (Psychiatry)  Jonny Narajno MD as Consulting Physician (Sleep Medicine)      History of present illness:  Thank you for asking me to see your patient.  The patient is a 47 y.o. male With allergic rhinitis PSVT hyperlipidemia IBS GERD PTSD migraine headaches LEN presents today to establish care.  Has had several sleep studies in the past.  Diagnosed in 2004.  On a CPAP machine however machine is broken beyond repair.  Machine is also over 5 years old.  Reports snoring witnessed apneas waking up gasping for breath waking up coughing/choking morning headaches waking with dry mouth pain disrupting sleep and waking at night with sour taste sleep also episodes restless sleep.  There is a family history of sleep apnea.  Overweight BMI 28.4. I have reviewed sleep study from July 2016 which showed overall AHI 5.7.    Bedtime 10 PM to 11 PM sleep latency 1 hour wake time 5:30 AM weekdays 8:30 AM weekends sleeps 2 to 3 hours at a time.  No naps.  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    ESS: 7    Social History:  half a can of tobacco 2 a day since age 12 no alcohol use no drug use 2-3 caffeine beverages a day    Allergies:  Oxycodone-acetaminophen    Family History: LNE yes       Current Outpatient Medications:   •  dicyclomine (BENTYL) 20 MG tablet, Take 1 tablet by mouth 3 (Three) Times a Day As Needed (IBS)., Disp: 90 tablet, Rfl: 11  •  eletriptan (RELPAX) 20 MG tablet, Take 1 tablet by mouth 1 (One) Time As Needed for  "Migraine for up to 1 dose. may repeat in 2 hours if necessary, Disp: 15 tablet, Rfl: 11  •  Erenumab-aooe (Aimovig) 140 MG/ML prefilled syringe, Inject 1 mL under the skin into the appropriate area as directed Every 30 (Thirty) Days. For migraines, Disp: 1.12 mL, Rfl: 11  •  FIBER COMPLETE tablet, Take 1 tablet by mouth 2 (Two) Times a Day., Disp: , Rfl:   •  fluticasone (FLONASE) 50 MCG/ACT nasal spray, 1 spray into the nostril(s) as directed by provider Daily As Needed., Disp: , Rfl:   •  gabapentin (NEURONTIN) 300 MG capsule, Take 300 mg by mouth 3 (Three) Times a Day., Disp: , Rfl:   •  melatonin 3 MG tablet, Take  by mouth Every Night. WITH CBD OIL/PT HOLDING FOR SURGERY, Disp: , Rfl:   •  metoprolol succinate XL (TOPROL-XL) 50 MG 24 hr tablet, Take 1 tablet by mouth Daily. For heart rate (Patient taking differently: Take 50 mg by mouth Every Morning. For heart rate), Disp: 90 tablet, Rfl: 3  •  montelukast (SINGULAIR) 10 MG tablet, Take 1 tablet by mouth Every Night., Disp: 90 tablet, Rfl: 3  •  ondansetron (Zofran) 4 MG tablet, Take 1 tablet by mouth Every 8 (Eight) Hours As Needed for Nausea or Vomiting., Disp: 20 tablet, Rfl: 0  •  ondansetron ODT (ZOFRAN-ODT) 4 MG disintegrating tablet, Place 4 mg on the tongue Every 8 (Eight) Hours As Needed., Disp: , Rfl:   •  pantoprazole (PROTONIX) 20 MG EC tablet, Take 1 tablet by mouth Daily. For GERD (Patient taking differently: Take 20 mg by mouth Every Morning. For GERD), Disp: 90 tablet, Rfl: 3  •  Peppermint Oil (IBGARD PO), Take 1 capsule by mouth 2 (Two) Times a Day. PT HOLDING FOR SURGERY, Disp: , Rfl:   •  Probiotic Product (ALIGN PO), Take 1 capsule by mouth Every Morning. PT HOLDING FOR SURGERY, Disp: , Rfl:     Vital Signs:    Vitals:    04/13/22 0836   BP: 109/77   Pulse: 64   SpO2: 98%   Weight: 84.8 kg (187 lb)   Height: 172.7 cm (68\")      Body mass index is 28.43 kg/m².  Neck Circumference: 15 inches      REVIEW OF SYSTEMS.  Full review of systems " "available on the intake form which is scanned in the media tab.  The relevant positive are noted below  1. Daytime excessive sleepiness with [place] Sleepiness Scale :Total score: 7   2. Snoring  3. All negative      Physical exam:  Vitals:    04/13/22 0836   BP: 109/77   Pulse: 64   SpO2: 98%   Weight: 84.8 kg (187 lb)   Height: 172.7 cm (68\")    Body mass index is 28.43 kg/m². Neck Circumference: 15 inches  HEENT: Head is atraumatic, normocephalic  Eyes: pupils are round equal and reacting to light and accommodation, conjunctiva normal  Throat: tongue normal, oral airway Mallampati class 2-3  NECK:Neck Circumference: 15 inches, trachea is in the midline, thyroid not enlarged  RESPIRATORY SYSTEM: Regular respirations  CARDIOVASULAR SYSTEM: Regular rate  EXTREMITES: No cyanosis, clubbing  NEUROLOGICAL SYSTEM: Oriented x 3, no gross motor defects, gait normal      Impression:  1. LEN (obstructive sleep apnea)    2. Hypersomnia    3. Non-restorative sleep    4. Overweight with body mass index (BMI) 25.0-29.9        Plan:    Good sleep hygiene measures should be maintained.  Weight loss would be beneficial in this patient who is overweight BMI 28.4.    I discussed the pathophysiology of obstructive sleep apnea with the patient.  We discussed the adverse outcomes associated with untreated sleep-disordered breathing.  We discussed treatment modalities of obstructive sleep apnea including CPAP device as well as oral mandibular advancement device. Weight loss will be strongly beneficial in order to reduce the severity of sleep-disordered breathing.  Patient has narrow oropharyngeal structure.  Caution during activities that require prolonged concentration is strongly advised.     Machine is broken beyond repair and over 5 years old.  We have a sleep study on file from 2016 which I have reviewed as noted above.  Order placed for new auto CPAP machine 5-15 cm H2O.  Return to clinic after least 1 month use of Pap for " follow-up or sooner if needed.    Thank you for allowing me to participate in your patient's care.    Jonny Naranjo MD  Sleep Medicine  04/13/22  09:08 EDT

## 2022-05-10 ENCOUNTER — OFFICE VISIT (OUTPATIENT)
Dept: CARDIOLOGY | Facility: CLINIC | Age: 47
End: 2022-05-10

## 2022-05-10 VITALS
SYSTOLIC BLOOD PRESSURE: 112 MMHG | HEART RATE: 61 BPM | BODY MASS INDEX: 27.43 KG/M2 | DIASTOLIC BLOOD PRESSURE: 76 MMHG | WEIGHT: 181 LBS | HEIGHT: 68 IN

## 2022-05-10 DIAGNOSIS — I10 HYPERTENSION, UNSPECIFIED TYPE: ICD-10-CM

## 2022-05-10 DIAGNOSIS — I47.1 PSVT (PAROXYSMAL SUPRAVENTRICULAR TACHYCARDIA): Primary | ICD-10-CM

## 2022-05-10 PROCEDURE — 99214 OFFICE O/P EST MOD 30 MIN: CPT

## 2022-05-10 PROCEDURE — 93000 ELECTROCARDIOGRAM COMPLETE: CPT

## 2022-05-10 NOTE — PROGRESS NOTES
Date of Office Visit: 05/10/2022  Encounter Provider: FORD Duff  Place of Service: [facility] CARDIOLOGY  Patient Name: Colby Duenas  :1975    Chief Complaint   Patient presents with   • PSVT     New Patient Consult   • Sleep Apnea   :     HPI: Colby Duenas is a 47 y.o. male who is followed by Mary Lopez and previously followed by [facility].  He has a history of paroxsymal supraventricular tachycardia.       Patient has a long history of PSVT.  He was first diagnosed in 2016.  A few months prior he was working his job as  and was electrocuted on the job.  Patient says that since then he has had frequent episodes of PSVT. The episodes got progressively worse toward the end of .    He had an EP study and placement of loop recorder in  with Dr. Moreno.  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  Patient received loop recorder on the same day, unfortunately the battery  in  and was not replaced.  He continues to have episodes of PSVT despite treatment with metoprolol.           He presents today for transition of care from [facility] to [facility].     Patient continues to have episodes of PSVT.  He says that these occur almost every night.  They used to occur during all hours of the day but after being on metoprolol, he only notices them at night.      These episodes occur almost every night.  Most episodes only last a few seconds to minutes, but he does believe some episodes last up to 30 minutes.      He does believe that the episodes are less frequent and says that the metoprolol does help, but he is bothered by these episodes which keep him up at night.  He also complains of palpitations, shortness of breath, and fatigue associated with episodes of PSVT.     Patient is a recovering alcoholic but has been sober for 4 years now.     He works as an  and has a fairly active  lifestyle.         Past Medical History:   Diagnosis Date   • Allergic    • Anxiety    • Arthritis    • Dislocation, shoulder    • Fracture, clavicle    • GERD (gastroesophageal reflux disease)    • History of colon polyps    • IBS (irritable bowel syndrome)    • Knee swelling 10/94   • Low back strain    • Migraines    • Periarthritis of shoulder 2014   • PSVT (paroxysmal supraventricular tachycardia) ([facility])    • Seasonal allergies    • Shoulder pain    • Sleep apnea     CPAP   • Stress fracture    • Tear of meniscus of knee    • Tendinitis of knee        Past Surgical History:   Procedure Laterality Date   • CARDIAC CATHETERIZATION     • COLONOSCOPY     • HAND SURGERY     • HERNIA REPAIR     • INGUINAL HERNIA REPAIR      X3   • SHOULDER ACROMIOCLAVICULAR JOINT REPAIR Right    • SHOULDER ARTHROSCOPY Right 2021    Procedure: SHOULDER ARTHROSCOPY WITH SUBACROMIAL DECOMPRESSION, DEBRIDEMENT  BURSAL SIDED ROTATOR CUFF;  Surgeon: Radha Caruso MD;  Location: [facility];  Service: Orthopedics;  Laterality: Right;   • SHOULDER SURGERY Right 2014   • TRIGGER POINT INJECTION  Multiple       Social History     Socioeconomic History   • Marital status:    Tobacco Use   • Smoking status: Former Smoker     Packs/day: 2.00     Years: 15.00     Pack years: 30.00     Types: Cigarettes     Start date: 7/10/1990     Quit date: 2017     Years since quittin.9   • Smokeless tobacco: Former User     Types: Chew     Quit date: 2014   Vaping Use   • Vaping Use: Never used   Substance and Sexual Activity   • Alcohol use: Not Currently     Alcohol/week: 0.0 standard drinks     Comment: Sober since 2018   • Drug use: Never   • Sexual activity: Yes     Partners: Female     Birth control/protection: None       Family History   Problem Relation Age of Onset   • Hypertension Mother    • Arthritis Mother    • Hypertension Father    • Malig Hyperthermia Neg Hx        Review of  Systems   Constitutional: Positive for malaise/fatigue. Negative for chills and fever.   Cardiovascular: Positive for irregular heartbeat and palpitations. Negative for chest pain, dyspnea on exertion, leg swelling, near-syncope, orthopnea, paroxysmal nocturnal dyspnea and syncope.   Respiratory: Negative for cough and shortness of breath.    Musculoskeletal: Negative for joint pain, joint swelling and myalgias.   Gastrointestinal: Negative for abdominal pain, diarrhea, melena, nausea and vomiting.   Genitourinary: Negative for frequency and hematuria.   Neurological: Negative for light-headedness, numbness, paresthesias and seizures.   Allergic/Immunologic: Negative.    All other systems reviewed and are negative.      Allergies   Allergen Reactions   • Oxycodone-Acetaminophen Hives         Current Outpatient Medications:   •  dicyclomine (BENTYL) 20 MG tablet, Take 1 tablet by mouth 3 (Three) Times a Day As Needed (IBS)., Disp: 90 tablet, Rfl: 11  •  eletriptan (RELPAX) 20 MG tablet, Take 1 tablet by mouth 1 (One) Time As Needed for Migraine for up to 1 dose. may repeat in 2 hours if necessary, Disp: 15 tablet, Rfl: 11  •  Erenumab-aooe (Aimovig) 140 MG/ML prefilled syringe, Inject 1 mL under the skin into the appropriate area as directed Every 30 (Thirty) Days. For migraines, Disp: 1.12 mL, Rfl: 11  •  FIBER COMPLETE tablet, Take 1 tablet by mouth 2 (Two) Times a Day., Disp: , Rfl:   •  fluticasone (FLONASE) 50 MCG/ACT nasal spray, 1 spray into the nostril(s) as directed by provider Daily As Needed., Disp: , Rfl:   •  gabapentin (NEURONTIN) 300 MG capsule, Take 300 mg by mouth 3 (Three) Times a Day., Disp: , Rfl:   •  melatonin 3 MG tablet, Take  by mouth Every Night. WITH CBD OIL/PT HOLDING FOR SURGERY, Disp: , Rfl:   •  metoprolol succinate XL (TOPROL-XL) 50 MG 24 hr tablet, Take 1 tablet by mouth Daily. For heart rate (Patient taking differently: Take 50 mg by mouth Every Morning. For heart rate), Disp: 90  "tablet, Rfl: 3  •  montelukast (SINGULAIR) 10 MG tablet, Take 1 tablet by mouth Every Night., Disp: 90 tablet, Rfl: 3  •  ondansetron (Zofran) 4 MG tablet, Take 1 tablet by mouth Every 8 (Eight) Hours As Needed for Nausea or Vomiting., Disp: 20 tablet, Rfl: 0  •  ondansetron ODT (ZOFRAN-ODT) 4 MG disintegrating tablet, Place 4 mg on the tongue Every 8 (Eight) Hours As Needed., Disp: , Rfl:   •  pantoprazole (PROTONIX) 20 MG EC tablet, Take 1 tablet by mouth Daily. For GERD (Patient taking differently: Take 20 mg by mouth Every Morning. For GERD), Disp: 90 tablet, Rfl: 3  •  Peppermint Oil (IBGARD PO), Take 1 capsule by mouth 2 (Two) Times a Day. PT HOLDING FOR SURGERY, Disp: , Rfl:   •  Probiotic Product (ALIGN PO), Take 1 capsule by mouth Every Morning. PT HOLDING FOR SURGERY, Disp: , Rfl:       Objective:     Vitals:    05/10/22 0849   BP: 112/76   Pulse: 61   Weight: 82.1 kg (181 lb)   Height: 172.7 cm (68\")     Body mass index is 27.52 kg/m².    PHYSICAL EXAM:    Vitals Reviewed.   General Appearance: No acute distress, well developed and well nourished.   Eyes: Conjunctiva and lids: No erythema, swelling, or discharge. Sclera non-icteric.   HENT: Atraumatic, normocephalic. External eyes, ears, and nose normal.   Respiratory: No signs of respiratory distress. Respiration rhythm and depth normal.   Clear to auscultation. No rales, crackles, rhonchi, or wheezing auscultated.   Cardiovascular:  Heart Rate and Rhythm: Normal, Heart Sounds: Normal S1 and S2. No S3 or S4 noted.  Gastrointestinal:  Abdomen soft, non-distended, non-tender.   Musculoskeletal: Normal movement of extremities  Skin: Warm and dry.   Psychiatric: Patient alert and oriented to person, place, and time. Speech and behavior appropriate. Normal mood and affect.       ECG 12 Lead    Date/Time: 5/10/2022 9:43 AM  Performed by: Zion Grajeda APRN  Authorized by: Nicci, Zion W, APRN   Comparison: compared with previous ECG   Similar to previous " ECG  Rhythm: sinus rhythm  BPM: 61                Assessment:       Diagnosis Plan   1. PSVT (paroxysmal supraventricular tachycardia) (HCC)     2. Hypertension, unspecified type            Plan:   1. PSVT- he has frequent episodes of PSVT that occur almost every night. However, most of these episodes are short in duration, per patient. They have gotten better since starting metoprolol but patient continues to be bothered by episodes of SVT.  He did have a loop recorder but unfortunately that was removed in 2020.  Previously followed with Dr. Moreno    2. HTN- currently well controlled on current regimen.  No adjustments made.       I reviewed the chart and EP study with Dr. Burrows.  Although he had a loop recorder, there is not a lot of data showing the duration and frequency of his episodes.  We believe that the best plan is to have him wear a 48 hour monitor to assess the severity of his episodes of SVT.  Once we have the monitor data we will adjust treatment accordingly and make further recommendations.     -Monitor has been placed  -He will follow up in 4 weeks or sooner pending monitor results    As always, it has been a pleasure to participate in your patient's care.      Sincerely,         FORD Duff

## 2022-05-25 ENCOUNTER — TELEPHONE (OUTPATIENT)
Dept: CARDIOLOGY | Facility: CLINIC | Age: 47
End: 2022-05-25

## 2022-05-25 NOTE — TELEPHONE ENCOUNTER
----- Message from FORD Griffin sent at 5/25/2022  3:16 PM EDT -----  Normal monitor, no evidence of arrythmia.   ----- Message -----  From: Kevin Burrows MD  Sent: 5/25/2022   2:23 PM EDT  To: FORD Griffin

## 2022-06-07 ENCOUNTER — OFFICE VISIT (OUTPATIENT)
Dept: CARDIOLOGY | Facility: CLINIC | Age: 47
End: 2022-06-07

## 2022-06-07 VITALS
BODY MASS INDEX: 29.25 KG/M2 | HEART RATE: 62 BPM | SYSTOLIC BLOOD PRESSURE: 118 MMHG | HEIGHT: 68 IN | WEIGHT: 193 LBS | DIASTOLIC BLOOD PRESSURE: 82 MMHG

## 2022-06-07 DIAGNOSIS — I47.1 PSVT (PAROXYSMAL SUPRAVENTRICULAR TACHYCARDIA): Primary | ICD-10-CM

## 2022-06-07 DIAGNOSIS — I10 PRIMARY HYPERTENSION: ICD-10-CM

## 2022-06-07 DIAGNOSIS — Z45.09 ENCOUNTER FOR LOOP RECORDER AT END OF BATTERY LIFE: ICD-10-CM

## 2022-06-07 PROCEDURE — 99214 OFFICE O/P EST MOD 30 MIN: CPT

## 2022-06-07 PROCEDURE — 93000 ELECTROCARDIOGRAM COMPLETE: CPT

## 2022-06-07 NOTE — PROGRESS NOTES
Date of Office Visit: 2022  Encounter Provider: FORD Duff  Place of Service: [facility] CARDIOLOGY  Patient Name: Colby Duenas  :1975    Chief Complaint   Patient presents with   • PSVT     1 month f/u    • sleep apnea   :     HPI: Colby Duenas is a 47 y.o. male who is followed by Mary Lopez and previously followed by [facility].  He has a history of paroxsymal supraventricular tachycardia.        Patient has a long history of PSVT.  He was first diagnosed in 2016.  A few months prior he was working his job as  and was electrocuted on the job.  Patient says that since then he has had frequent episodes of PSVT. The episodes got progressively worse toward the end of .     He had an EP study and placement of loop recorder in  with Dr. Moreno.  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  Patient received loop recorder on the same day, unfortunately the battery  in  and was not replaced.  He continues to have episodes of PSVT despite treatment with metoprolol.      I saw him on 5/10/2022. At that time he was complaining of episodes of SVT daily.  These episodes were mainly occurring at night, every night.  Seemed to be better controlled on metoprolol.  These episodes usually lasted minutes, with the longest lasting 30 minutes.  He had symptoms of palpitations, shortness of breath, and fatigue.  I asked him to wear a monitor and follow up to discuss findings in one month.               He presents today for follow up appt.     Patient wore a 48 hour monitor last month which did not show any evidence of arrhythmias .  He says that when he wore the monitor, things seemed to have calmed down.      However, since wearing the monitor he says that he continues to have episodes at night that are getting worse.  He says that often times he cannot sleep at night because of the palpitations  and fluttering.     He says that the metoprolol seems to help during the day, but at night time when he goes to lay down his symptoms usually start.  He continues to complain of palpitations, dyspnea, and fatigue.     He denies chest pain, dizziness, or syncope.     EKG shows NSR           Past Medical History:   Diagnosis Date   • Allergic    • Anxiety    • Arthritis    • Dislocation, shoulder    • Fracture, clavicle    • GERD (gastroesophageal reflux disease)    • History of colon polyps    • IBS (irritable bowel syndrome)    • Knee swelling 10/94   • Low back strain    • Migraines    • Periarthritis of shoulder 2014   • PSVT (paroxysmal supraventricular tachycardia) ([facility])    • Seasonal allergies    • Shoulder pain    • Sleep apnea     CPAP   • Stress fracture    • Tear of meniscus of knee    • Tendinitis of knee        Past Surgical History:   Procedure Laterality Date   • CARDIAC CATHETERIZATION     • COLONOSCOPY     • HAND SURGERY     • HERNIA REPAIR     • INGUINAL HERNIA REPAIR      X3   • SHOULDER ACROMIOCLAVICULAR JOINT REPAIR Right    • SHOULDER ARTHROSCOPY Right 2021    Procedure: SHOULDER ARTHROSCOPY WITH SUBACROMIAL DECOMPRESSION, DEBRIDEMENT  BURSAL SIDED ROTATOR CUFF;  Surgeon: Radha Caruso MD;  Location: [facility] [place];  Service: Orthopedics;  Laterality: Right;   • SHOULDER SURGERY Right 2014   • TRIGGER POINT INJECTION  Multiple       Social History     Socioeconomic History   • Marital status:    Tobacco Use   • Smoking status: Former Smoker     Packs/day: 2.00     Years: 15.00     Pack years: 30.00     Types: Cigarettes     Start date: 7/10/1990     Quit date: 2017     Years since quittin.0   • Smokeless tobacco: Former User     Types: Chew     Quit date: 2014   Vaping Use   • Vaping Use: Never used   Substance and Sexual Activity   • Alcohol use: Not Currently     Alcohol/week: 0.0 standard drinks     Comment: Sober since 2018    • Drug use: Never   • Sexual activity: Yes     Partners: Female     Birth control/protection: None       Family History   Problem Relation Age of Onset   • Hypertension Mother    • Arthritis Mother    • Hypertension Father    • Malig Hyperthermia Neg Hx        Review of Systems   Constitutional: Negative for chills, fever and malaise/fatigue.   Cardiovascular: Positive for irregular heartbeat and palpitations. Negative for chest pain, dyspnea on exertion, leg swelling, near-syncope, orthopnea, paroxysmal nocturnal dyspnea and syncope.   Respiratory: Negative for cough and shortness of breath.    Hematologic/Lymphatic: Negative.    Musculoskeletal: Negative for joint pain, joint swelling and myalgias.   Gastrointestinal: Negative for abdominal pain, diarrhea, melena, nausea and vomiting.   Genitourinary: Negative for frequency and hematuria.   Neurological: Negative for light-headedness, numbness, paresthesias and seizures.   Allergic/Immunologic: Negative.    All other systems reviewed and are negative.      Allergies   Allergen Reactions   • Oxycodone-Acetaminophen Hives         Current Outpatient Medications:   •  dicyclomine (BENTYL) 20 MG tablet, Take 1 tablet by mouth 3 (Three) Times a Day As Needed (IBS)., Disp: 90 tablet, Rfl: 11  •  eletriptan (RELPAX) 20 MG tablet, Take 1 tablet by mouth 1 (One) Time As Needed for Migraine for up to 1 dose. may repeat in 2 hours if necessary, Disp: 15 tablet, Rfl: 11  •  Erenumab-aooe (Aimovig) 140 MG/ML prefilled syringe, Inject 1 mL under the skin into the appropriate area as directed Every 30 (Thirty) Days. For migraines, Disp: 1.12 mL, Rfl: 11  •  FIBER COMPLETE tablet, Take 1 tablet by mouth 2 (Two) Times a Day., Disp: , Rfl:   •  fluticasone (FLONASE) 50 MCG/ACT nasal spray, 1 spray into the nostril(s) as directed by provider Daily As Needed., Disp: , Rfl:   •  gabapentin (NEURONTIN) 300 MG capsule, Take 300 mg by mouth 3 (Three) Times a Day., Disp: , Rfl:   •   "melatonin 3 MG tablet, Take  by mouth Every Night. WITH CBD OIL/PT HOLDING FOR SURGERY, Disp: , Rfl:   •  metoprolol succinate XL (TOPROL-XL) 50 MG 24 hr tablet, Take 1 tablet by mouth Daily. For heart rate (Patient taking differently: Take 50 mg by mouth Every Morning. For heart rate), Disp: 90 tablet, Rfl: 3  •  montelukast (SINGULAIR) 10 MG tablet, Take 1 tablet by mouth Every Night., Disp: 90 tablet, Rfl: 3  •  ondansetron (Zofran) 4 MG tablet, Take 1 tablet by mouth Every 8 (Eight) Hours As Needed for Nausea or Vomiting., Disp: 20 tablet, Rfl: 0  •  pantoprazole (PROTONIX) 20 MG EC tablet, Take 1 tablet by mouth Daily. For GERD (Patient taking differently: Take 20 mg by mouth Every Morning. For GERD), Disp: 90 tablet, Rfl: 3  •  Peppermint Oil (IBGARD PO), Take 1 capsule by mouth 2 (Two) Times a Day. PT HOLDING FOR SURGERY, Disp: , Rfl:   •  Probiotic Product (ALIGN PO), Take 1 capsule by mouth Every Morning. PT HOLDING FOR SURGERY, Disp: , Rfl:       Objective:     Vitals:    06/07/22 0859   BP: 118/82   Pulse: 62   Weight: 87.5 kg (193 lb)   Height: 172.7 cm (68\")     Body mass index is 29.35 kg/m².    PHYSICAL EXAM:    Vitals Reviewed.   General Appearance: No acute distress, well developed and well nourished.   Eyes: Conjunctiva and lids: No erythema, swelling, or discharge. Sclera non-icteric.   HENT: Atraumatic, normocephalic. External eyes, ears, and nose normal.   Respiratory: No signs of respiratory distress. Respiration rhythm and depth normal.   Clear to auscultation. No rales, crackles, rhonchi, or wheezing auscultated.   Cardiovascular:  Heart Rate and Rhythm: Normal, Heart Sounds: Normal S1 and S2. No S3 or S4 noted.  Gastrointestinal:  Abdomen soft, non-distended, non-tender.   Musculoskeletal: Normal movement of extremities  Skin: Warm and dry.   Psychiatric: Patient alert and oriented to person, place, and time. Speech and behavior appropriate. Normal mood and affect.       ECG 12 " Lead    Date/Time: 6/7/2022 9:17 AM  Performed by: Zion Grajeda APRN  Authorized by: Zion Grajeda APRN   Comparison: compared with previous ECG   Similar to previous ECG  Rhythm: sinus rhythm  BPM: 62                Assessment:       Diagnosis Plan   1. PSVT (paroxysmal supraventricular tachycardia) (HCC)     2. Encounter for loop recorder at end of battery life     3. Primary hypertension            Plan:   1-2. PSVT- no evidence of arrhyhtmia on 48 hour monitor last month.  Patient says that symptoms continue to get worse and are occurring daily. He would like to do a longer monitor.     I will try to order a two week monitor since we did not find anything on 48 hour.      3. HTN- currently well controlled.           I will call him with results of the monitor and adjust treatment pending results.     As always, it has been a pleasure to participate in your patient's care.      Sincerely,         FORD Duff

## 2022-06-28 RX ORDER — ERENUMAB-AOOE 140 MG/ML
1 INJECTION, SOLUTION SUBCUTANEOUS
Qty: 1.12 ML | Refills: 11 | Status: SHIPPED | OUTPATIENT
Start: 2022-06-28 | End: 2022-06-29 | Stop reason: SDUPTHER

## 2022-06-29 ENCOUNTER — TELEPHONE (OUTPATIENT)
Dept: FAMILY MEDICINE CLINIC | Facility: CLINIC | Age: 47
End: 2022-06-29

## 2022-06-29 RX ORDER — ERENUMAB-AOOE 140 MG/ML
1 INJECTION, SOLUTION SUBCUTANEOUS
Qty: 3 PEN | Refills: 3 | Status: SHIPPED | OUTPATIENT
Start: 2022-06-29 | End: 2022-07-13 | Stop reason: SDUPTHER

## 2022-06-29 RX ORDER — ERENUMAB-AOOE 140 MG/ML
1 INJECTION, SOLUTION SUBCUTANEOUS
Qty: 1.12 ML | Refills: 11 | Status: CANCELLED | OUTPATIENT
Start: 2022-06-29

## 2022-06-29 NOTE — TELEPHONE ENCOUNTER
----- Message from Zoe Colmenares MA sent at 6/29/2022  4:03 PM EDT -----  Regarding: FW: medication/Aimovig  Can you send in the #90 supply for this to [facility] for him?    Thanks  Zoe   ----- Message -----  From: Cloby Duenas  Sent: 6/29/2022   2:55 PM EDT  To: Zoe Colmenares MA  Subject: medication/Aimovig                               That is perfect

## 2022-07-13 ENCOUNTER — OFFICE VISIT (OUTPATIENT)
Dept: FAMILY MEDICINE CLINIC | Facility: CLINIC | Age: 47
End: 2022-07-13

## 2022-07-13 ENCOUNTER — TELEPHONE (OUTPATIENT)
Dept: FAMILY MEDICINE CLINIC | Facility: CLINIC | Age: 47
End: 2022-07-13

## 2022-07-13 VITALS
TEMPERATURE: 97.5 F | BODY MASS INDEX: 27.78 KG/M2 | HEIGHT: 68 IN | RESPIRATION RATE: 16 BRPM | SYSTOLIC BLOOD PRESSURE: 115 MMHG | DIASTOLIC BLOOD PRESSURE: 62 MMHG | HEART RATE: 79 BPM | WEIGHT: 183.3 LBS | OXYGEN SATURATION: 98 %

## 2022-07-13 DIAGNOSIS — E55.9 VITAMIN D DEFICIENCY: ICD-10-CM

## 2022-07-13 DIAGNOSIS — K58.0 IRRITABLE BOWEL SYNDROME WITH DIARRHEA: ICD-10-CM

## 2022-07-13 DIAGNOSIS — G47.33 OSA ON CPAP: ICD-10-CM

## 2022-07-13 DIAGNOSIS — S00.419A ABRASION OF EAR, UNSPECIFIED LATERALITY, INITIAL ENCOUNTER: ICD-10-CM

## 2022-07-13 DIAGNOSIS — I47.1 PSVT (PAROXYSMAL SUPRAVENTRICULAR TACHYCARDIA): ICD-10-CM

## 2022-07-13 DIAGNOSIS — G43.109 MIGRAINE WITH AURA AND WITHOUT STATUS MIGRAINOSUS, NOT INTRACTABLE: Primary | ICD-10-CM

## 2022-07-13 DIAGNOSIS — E78.2 HYPERLIPIDEMIA, MIXED: ICD-10-CM

## 2022-07-13 DIAGNOSIS — K21.9 GASTROESOPHAGEAL REFLUX DISEASE WITHOUT ESOPHAGITIS: ICD-10-CM

## 2022-07-13 DIAGNOSIS — D69.6 THROMBOCYTOPENIA: ICD-10-CM

## 2022-07-13 DIAGNOSIS — Z99.89 OSA ON CPAP: ICD-10-CM

## 2022-07-13 PROCEDURE — 99214 OFFICE O/P EST MOD 30 MIN: CPT | Performed by: PHYSICIAN ASSISTANT

## 2022-07-13 RX ORDER — DICYCLOMINE HCL 20 MG
20 TABLET ORAL 3 TIMES DAILY PRN
Qty: 90 TABLET | Refills: 11 | Status: SHIPPED | OUTPATIENT
Start: 2022-07-13 | End: 2023-03-09

## 2022-07-13 RX ORDER — PANTOPRAZOLE SODIUM 20 MG/1
20 TABLET, DELAYED RELEASE ORAL DAILY
Qty: 90 TABLET | Refills: 3 | Status: SHIPPED | OUTPATIENT
Start: 2022-07-13 | End: 2022-07-18

## 2022-07-13 RX ORDER — ELETRIPTAN HYDROBROMIDE 20 MG/1
20 TABLET, FILM COATED ORAL ONCE AS NEEDED
Qty: 15 TABLET | Refills: 11 | Status: SHIPPED | OUTPATIENT
Start: 2022-07-13

## 2022-07-13 RX ORDER — NEOMYCIN SULFATE, POLYMYXIN B SULFATE AND HYDROCORTISONE 10; 3.5; 1 MG/ML; MG/ML; [USP'U]/ML
3 SUSPENSION/ DROPS AURICULAR (OTIC) 4 TIMES DAILY
Qty: 10 ML | Refills: 11 | Status: SHIPPED | OUTPATIENT
Start: 2022-07-13 | End: 2023-03-09

## 2022-07-13 RX ORDER — ERENUMAB-AOOE 140 MG/ML
1 INJECTION, SOLUTION SUBCUTANEOUS
Qty: 3 PEN | Refills: 3 | Status: SHIPPED | OUTPATIENT
Start: 2022-07-13 | End: 2022-07-13 | Stop reason: SDUPTHER

## 2022-07-13 RX ORDER — METOPROLOL SUCCINATE 50 MG/1
50 TABLET, EXTENDED RELEASE ORAL EVERY MORNING
Qty: 90 TABLET | Refills: 3 | Status: SHIPPED | OUTPATIENT
Start: 2022-07-13

## 2022-07-13 RX ORDER — MONTELUKAST SODIUM 10 MG/1
10 TABLET ORAL NIGHTLY
Qty: 90 TABLET | Refills: 3 | Status: SHIPPED | OUTPATIENT
Start: 2022-07-13

## 2022-07-13 NOTE — PROGRESS NOTES
"Subjective   Colby Duenas is a 47 y.o. male.     History of Present Illness    Since the last visit, he has overall felt well.  He has GERD controlled on PPI Rx, Hyperlipidemia and working on this with diet and exercise, Seasonal allergies and doing well on their medication , Vitamin D deficiency and will update labs for continued management, Migraine headaches and responding well to PRN triptan Rx and Migraine headaches and well controlled on suppression medication.  he has been compliant with current medications have reviewed them.  The patient denies medication side effects.  Will refill medications. /62 (BP Location: Left arm, Patient Position: Sitting, Cuff Size: Adult)   Pulse 79   Temp 97.5 °F (36.4 °C)   Resp 16   Ht 172.7 cm (67.99\")   Wt 83.1 kg (183 lb 4.8 oz)   SpO2 98%   BMI 27.88 kg/m²   3-4 migraines a month on the Aimovig;  Relpax works PRN  Results for orders placed or performed during the hospital encounter of 07/05/22   COVID-19,[facility] ROUTINE, NP/OP SWAB IN TRANSPORT MEDIA OR ESWAB 72 HR TAT - Swab, Anterior nasal    Specimen: Anterior nasal; Swab   Result Value Ref Range    SARS-CoV-2, CHLOE Detected (A) Not Detected   SARS-CoV-2, CHLOE 2 DAY TAT - Swab, Anterior nasal    Specimen: Anterior nasal; Swab   Result Value Ref Range    [facility] SARS-COV-2, CHLOE 2 DAY TAT Performed    tapering off Gabapentin ---not seeing Dr Han    Chol score 2.15%    Patient was seen in urgent care 7/5/2022 for respiratory infection and had positive COVID swab.  Was given prednisone and azithromycin.  Patient saw cardiology NP on 6/7/2022 following up on his PSVT.  And his loop recorder.  Noted no evidence of arrhythmia on 48-hour Holter monitor last month.  Patient reported symptoms are getting worse and occurring daily and would like to monitor longer and cardiology ordered 2-week extension.  Noted blood pressure well controlled.  Will update labs and note patient still needs the platelet count " citrated to further evaluate the low platelet count and refer to hematology if this comes back low.  Dr Naranjo--- had follow-up sleep apnea  Saw Ortho for shoulder pain on right and was injected with steroid for treatment     Prior notes: ---  On beta blocker for hx sinus tachycardia; hx syncope  Loop recorder----released from cardio-----DR Moreno   Echo stress test 12-23-16  Conclusions:   Global left ventricular wall motion and contractility are within normal   limits.   The EF 4ch was calculated at 58%.   Normal left ventricular diastolic filling is observed.   Definity was used to optimize study.   Normal Sress Echo   Normal Stress EKG    Has been to neurology in the past and saw Dr. Cordova for migraines and hx concussions.  Last visit he reported that he was not taking his prevention medicine due to insurance.  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      The following portions of the patient's history were reviewed and updated as appropriate: allergies, current medications, past family history, past medical history, past social history, past surgical history and problem list.    Review of Systems   Constitutional: Negative for activity change, appetite change and unexpected weight change.   HENT: Negative for nosebleeds and trouble swallowing.    Eyes: Negative for pain and visual disturbance.   Respiratory: Negative for chest tightness, shortness of breath and wheezing.    Cardiovascular: Positive for palpitations. Negative for chest pain.   Gastrointestinal: Negative for abdominal pain and blood in stool.   Endocrine: Negative.    Genitourinary: Negative for difficulty urinating and hematuria.   Musculoskeletal: Negative for joint swelling.   Skin: Negative for color change and  rash.   Allergic/Immunologic: Negative.    Neurological: Positive for headaches. Negative for syncope and speech difficulty.   Hematological: Negative for adenopathy.   Psychiatric/Behavioral: Positive for sleep disturbance. Negative for agitation and confusion.   All other systems reviewed and are negative.      Objective   Physical Exam  Vitals and nursing note reviewed.   Constitutional:       General: He is not in acute distress.     Appearance: Normal appearance. He is well-developed. He is not diaphoretic.   HENT:      Head: Normocephalic.      Ears:      Comments: Ear with crusted blood area  Eyes:      General: No scleral icterus.     Conjunctiva/sclera: Conjunctivae normal.      Pupils: Pupils are equal, round, and reactive to light.   Neck:      Vascular: No carotid bruit.   Cardiovascular:      Rate and Rhythm: Normal rate and regular rhythm.      Pulses: Normal pulses.      Heart sounds: Normal heart sounds. No murmur heard.  Pulmonary:      Effort: Pulmonary effort is normal.      Breath sounds: Normal breath sounds. No rales.   Abdominal:      Palpations: Abdomen is soft.      Tenderness: There is no abdominal tenderness.   Musculoskeletal:         General: Normal range of motion.      Cervical back: Normal range of motion and neck supple.      Right lower leg: No edema.      Left lower leg: No edema.   Skin:     General: Skin is warm and dry.      Findings: No rash.   Neurological:      General: No focal deficit present.      Mental Status: He is alert and oriented to person, place, and time.   Psychiatric:         Mood and Affect: Mood normal. Affect is not inappropriate.         Behavior: Behavior normal.         Thought Content: Thought content normal.         Judgment: Judgment normal.         Assessment & Plan   Diagnoses and all orders for this visit:    1. Migraine with aura and without status migrainosus, not intractable (Primary)    2. PSVT (paroxysmal supraventricular tachycardia)  (HCC)    3. Gastroesophageal reflux disease without esophagitis    4. Hyperlipidemia, mixed    5. LEN on CPAP  Comments:  on hold---sees Dr Naranjo    6. Irritable bowel syndrome with diarrhea  Comments:  on Bentyl PRN    7. Vitamin D deficiency    8. Thrombocytopenia (HCC)    Other orders  -     Erenumab-aooe (Aimovig) 140 MG/ML prefilled syringe; Inject 1 mL under the skin into the appropriate area as directed Every 30 (Thirty) Days. For migraines  Dispense: 3 pen; Refill: 3  -     eletriptan (RELPAX) 20 MG tablet; Take 1 tablet by mouth 1 (One) Time As Needed for Migraine for up to 1 dose. may repeat in 2 hours if necessary  Dispense: 15 tablet; Refill: 11  -     dicyclomine (BENTYL) 20 MG tablet; Take 1 tablet by mouth 3 (Three) Times a Day As Needed (IBS).  Dispense: 90 tablet; Refill: 11  -     pantoprazole (PROTONIX) 20 MG EC tablet; Take 1 tablet by mouth Daily. For GERD  Dispense: 90 tablet; Refill: 3  -     montelukast (SINGULAIR) 10 MG tablet; Take 1 tablet by mouth Every Night.  Dispense: 90 tablet; Refill: 3  -     metoprolol succinate XL (TOPROL-XL) 50 MG 24 hr tablet; Take 1 tablet by mouth Every Morning. For heart rate  Dispense: 90 tablet; Refill: 3      3-4 migraines a month on the Aimovig;  Relpax works PRN  Seeing Ortho for right shoulder pain  Positive diagnosis COVID treated with prednisone and azithromycin from 7/5/2022-resolving  Just had follow-up with cardiology for PVST and monitoring for symptomatic complaint of arrhythmia and does have follow-up  Plan, Colby Duenas, was seen today.  he was seen for GERD and will continue on PPI medication, Hyperlipidemia and will work on this with diet and exercise, Seasonal allergies and is doing well on their medication PRN, Vitamin D deficiency and will update labs , Migraine headaches and will continue with their PRN triptan and Migraine headaches and well controlled on their suppressive medication.  tapering off Gabapentin ---not seeing   Guille  Working with Dr Naranjo to obtain new CPAP for obstructive sleep apnea treatment  Bentyl for IBS works well as needed and pantoprazole works well for GERD  We will obtain citrated platelet level this week for further evaluation of thrombocytopenia  Chol score 2.15%       Answers for HPI/ROS submitted by the patient on 7/12/2022  What is the primary reason for your visit?: Other  Please describe your symptoms.: Follow up appointment  Have you had these symptoms before?: No  How long have you been having these symptoms?: 1-4 days

## 2022-07-13 NOTE — TELEPHONE ENCOUNTER
Caller: ESTEFANI MCLAUGHLIN    Relationship: Emergency Contact    Best call back number: [phone]    Requested Prescriptions:   Requested Prescriptions     Pending Prescriptions Disp Refills   • Erenumab-aooe (Aimovig) 140 MG/ML prefilled syringe 3 pen 3     Sig: Inject 1 mL under the skin into the appropriate area as directed Every 30 (Thirty) Days. For migraines        Pharmacy where request should be sent: [facility] [address] - [phone]  - [phone] FX     Additional details provided by patient: PATIENTS WIFE STATED THAT THE PHARMACY STATED THEY HAVE NOT RECEIVED IT AS OF YET.    PATIENT HAS BEEN OUT FOR TWO WEEKS.    Does the patient have less than a 3 day supply:  [x] Yes  [] No    Carlos Diaz Rep   07/13/22 13:21 EDT

## 2022-07-13 NOTE — PATIENT INSTRUCTIONS
"[email or web address]">   Heartburn  Heartburn is a type of pain or discomfort that can happen in the throat or chest. It is often described as a burning pain. It may also cause a bad, acid-like taste in the mouth. Heartburn may feel worse when you lie down or bend over, and it is often worse at night. Heartburn may be caused by stomach contents that move back up into the esophagus (reflux).  Follow these instructions at home:  Eating and drinking    Avoid certain foods and drinks as told by your health care provider. This may include:  Coffee and tea, with or without caffeine.  Drinks that contain alcohol.  Energy drinks and sports drinks.  Carbonated drinks or sodas.  Chocolate and cocoa.  Peppermint and mint flavorings.  Garlic and onions.  Horseradish.  Spicy and acidic foods, including peppers, chili powder, olmos powder, vinegar, hot sauces, and barbecue sauce.  Citrus fruit juices and citrus fruits, such as oranges, imtiaz, and limes.  Tomato-based foods, such as red sauce, chili, salsa, and pizza with red sauce.  Fried and fatty foods, such as donuts, french fries, potato chips, and high-fat dressings.  High-fat meats, such as hot dogs and fatty cuts of red and white meats, such as rib eye steak, sausage, ham, and suero.  High-fat dairy items, such as whole milk, butter, and cream cheese.  Eat small, frequent meals instead of large meals.  Avoid drinking large amounts of liquid with your meals.  Avoid eating meals during the 2-3 hours before bedtime.  Avoid lying down right after you eat.  Do not exercise right after you eat.    Lifestyle         If you are overweight, reduce your weight to an amount that is healthy for you. Ask your health care provider for guidance about a safe weight loss goal.  Do not use any products that contain nicotine or tobacco. These products include cigarettes, chewing tobacco, and vaping devices, such as e-cigarettes. These can make symptoms " worse. If you need help quitting, ask your health care provider.  Wear loose-fitting clothing. Do not wear anything tight around your waist that causes pressure on your abdomen.  Raise (elevate) the head of your bed about 6 inches (15 cm) when you sleep. You can use a wedge to do this.  Try to reduce your stress, such as with yoga or meditation. If you need help reducing stress, ask your health care provider.  Medicines  Take over-the-counter and prescription medicines only as told by your health care provider.  Do not take aspirin or NSAIDs, such as ibuprofen, unless your health care provider told you to do so.  Stop medicines only as told by your health care provider. If you stop taking some medicines too quickly, your symptoms may get worse.  General instructions  Pay attention to any changes in your symptoms.  Keep all follow-up visits. This is important.  Contact a health care provider if:  You have new symptoms.  You have unexplained weight loss.  You have difficulty swallowing, or it hurts to swallow.  You have wheezing or a persistent cough.  Your symptoms do not improve with treatment.  You have frequent heartburn for more than 2 weeks.  Get help right away if:  You suddenly have pain in your arms, neck, jaw, teeth, or back.  You suddenly feel sweaty, dizzy, or light-headed.  You have chest pain or shortness of breath.  You vomit and your vomit looks like blood or coffee grounds.  Your stool is bloody or black.  These symptoms may represent a serious problem that is an emergency. Do not wait to see if the symptoms will go away. Get medical help right away. Call your local emergency services (911 in the [place] Do not drive yourself to the hospital.  Summary  Heartburn is a type of pain or discomfort that can happen in the throat or chest. It is often described as a burning pain. It may also cause a bad, acid-like taste in the mouth.  Avoid certain foods and drinks as told by your health care provider.  Take  over-the-counter and prescription medicines only as told by your health care provider. Do not take aspirin or NSAIDs, such as ibuprofen, unless your health care provider told you to do so.  Contact a health care provider if your symptoms do not improve or they get worse.  This information is not intended to replace advice given to you by your health care provider. Make sure you discuss any questions you have with your health care provider.  Document Revised: 06/23/2021 Document Reviewed: 06/23/2021  Elsevier Patient Education © 2021 [employer]

## 2022-07-14 RX ORDER — ERENUMAB-AOOE 140 MG/ML
1 INJECTION, SOLUTION SUBCUTANEOUS
Qty: 3 PEN | Refills: 3 | Status: SHIPPED | OUTPATIENT
Start: 2022-07-14 | End: 2022-07-20 | Stop reason: SDUPTHER

## 2022-07-14 NOTE — TELEPHONE ENCOUNTER
The is not a PA, the medication just needed to be sent to the pharmacy as far as I can tell.   No PA paperwork rcvd and nothing in the message mentions a PA.  Sending medication.     Zoe

## 2022-07-15 ENCOUNTER — LAB (OUTPATIENT)
Dept: LAB | Facility: HOSPITAL | Age: 47
End: 2022-07-15

## 2022-07-15 ENCOUNTER — PRIOR AUTHORIZATION (OUTPATIENT)
Dept: FAMILY MEDICINE CLINIC | Facility: CLINIC | Age: 47
End: 2022-07-15

## 2022-07-15 LAB
25(OH)D3 SERPL-MCNC: 49.5 NG/ML (ref 30–100)
ALBUMIN SERPL-MCNC: 4.2 G/DL (ref 3.5–5.2)
ALBUMIN/GLOB SERPL: 1.6 G/DL
ALP SERPL-CCNC: 67 U/L (ref 39–117)
ALT SERPL W P-5'-P-CCNC: 32 U/L (ref 1–41)
ANION GAP SERPL CALCULATED.3IONS-SCNC: 9 MMOL/L (ref 5–15)
AST SERPL-CCNC: 26 U/L (ref 1–40)
BASOPHILS # BLD AUTO: 0.07 10*3/MM3 (ref 0–0.2)
BASOPHILS NFR BLD AUTO: 0.8 % (ref 0–1.5)
BILIRUB SERPL-MCNC: 0.3 MG/DL (ref 0–1.2)
BUN SERPL-MCNC: 10 MG/DL (ref 6–20)
BUN/CREAT SERPL: 9.7 (ref 7–25)
CALCIUM SPEC-SCNC: 9.2 MG/DL (ref 8.6–10.5)
CHLORIDE SERPL-SCNC: 102 MMOL/L (ref 98–107)
CHOLEST SERPL-MCNC: 170 MG/DL (ref 0–200)
CO2 SERPL-SCNC: 25 MMOL/L (ref 22–29)
CREAT SERPL-MCNC: 1.03 MG/DL (ref 0.76–1.27)
DEPRECATED RDW RBC AUTO: 41.7 FL (ref 37–54)
EGFRCR SERPLBLD CKD-EPI 2021: 89.6 ML/MIN/1.73
EOSINOPHIL # BLD AUTO: 0.26 10*3/MM3 (ref 0–0.4)
EOSINOPHIL NFR BLD AUTO: 2.9 % (ref 0.3–6.2)
ERYTHROCYTE [DISTWIDTH] IN BLOOD BY AUTOMATED COUNT: 12.9 % (ref 12.3–15.4)
FOLATE SERPL-MCNC: 12.9 NG/ML (ref 4.78–24.2)
GLOBULIN UR ELPH-MCNC: 2.6 GM/DL
GLUCOSE SERPL-MCNC: 79 MG/DL (ref 65–99)
HBA1C MFR BLD: 5.4 % (ref 4.8–5.6)
HCT VFR BLD AUTO: 38.2 % (ref 37.5–51)
HDLC SERPL-MCNC: 39 MG/DL (ref 40–60)
HGB BLD-MCNC: 13 G/DL (ref 13–17.7)
IMM GRANULOCYTES # BLD AUTO: 0.22 10*3/MM3 (ref 0–0.05)
IMM GRANULOCYTES NFR BLD AUTO: 2.5 % (ref 0–0.5)
LDLC SERPL CALC-MCNC: 90 MG/DL (ref 0–100)
LDLC/HDLC SERPL: 2.12 {RATIO}
LYMPHOCYTES # BLD AUTO: 2.63 10*3/MM3 (ref 0.7–3.1)
LYMPHOCYTES NFR BLD AUTO: 29.7 % (ref 19.6–45.3)
MCH RBC QN AUTO: 30.2 PG (ref 26.6–33)
MCHC RBC AUTO-ENTMCNC: 34 G/DL (ref 31.5–35.7)
MCV RBC AUTO: 88.6 FL (ref 79–97)
MONOCYTES # BLD AUTO: 0.81 10*3/MM3 (ref 0.1–0.9)
MONOCYTES NFR BLD AUTO: 9.1 % (ref 5–12)
NEUTROPHILS NFR BLD AUTO: 4.88 10*3/MM3 (ref 1.7–7)
NEUTROPHILS NFR BLD AUTO: 55 % (ref 42.7–76)
NRBC BLD AUTO-RTO: 0.1 /100 WBC (ref 0–0.2)
PLATELET # BLD AUTO: 138 10*3/MM3 (ref 140–450)
PLATELETS (CITRATED) BY AUTOMATED COUNT: 279 10*3/MM3 (ref 140–450)
PMV BLD AUTO: 12.6 FL (ref 6–12)
POTASSIUM SERPL-SCNC: 4.6 MMOL/L (ref 3.5–5.2)
PROT SERPL-MCNC: 6.8 G/DL (ref 6–8.5)
RBC # BLD AUTO: 4.31 10*6/MM3 (ref 4.14–5.8)
SODIUM SERPL-SCNC: 136 MMOL/L (ref 136–145)
T3FREE SERPL-MCNC: 3.29 PG/ML (ref 2–4.4)
T4 FREE SERPL-MCNC: 1.16 NG/DL (ref 0.93–1.7)
TRIGL SERPL-MCNC: 242 MG/DL (ref 0–150)
TSH SERPL DL<=0.05 MIU/L-ACNC: 1.74 UIU/ML (ref 0.27–4.2)
VIT B12 BLD-MCNC: 907 PG/ML (ref 211–946)
VLDLC SERPL-MCNC: 41 MG/DL (ref 5–40)
WBC NRBC COR # BLD: 8.87 10*3/MM3 (ref 3.4–10.8)

## 2022-07-15 PROCEDURE — 80061 LIPID PANEL: CPT | Performed by: PHYSICIAN ASSISTANT

## 2022-07-15 PROCEDURE — 83036 HEMOGLOBIN GLYCOSYLATED A1C: CPT | Performed by: PHYSICIAN ASSISTANT

## 2022-07-15 PROCEDURE — 84481 FREE ASSAY (FT-3): CPT | Performed by: PHYSICIAN ASSISTANT

## 2022-07-15 PROCEDURE — 82306 VITAMIN D 25 HYDROXY: CPT | Performed by: PHYSICIAN ASSISTANT

## 2022-07-15 PROCEDURE — 36415 COLL VENOUS BLD VENIPUNCTURE: CPT | Performed by: PHYSICIAN ASSISTANT

## 2022-07-15 PROCEDURE — 85049 AUTOMATED PLATELET COUNT: CPT | Performed by: PHYSICIAN ASSISTANT

## 2022-07-15 PROCEDURE — 82746 ASSAY OF FOLIC ACID SERUM: CPT | Performed by: PHYSICIAN ASSISTANT

## 2022-07-15 PROCEDURE — 84439 ASSAY OF FREE THYROXINE: CPT | Performed by: PHYSICIAN ASSISTANT

## 2022-07-15 PROCEDURE — 82607 VITAMIN B-12: CPT | Performed by: PHYSICIAN ASSISTANT

## 2022-07-15 PROCEDURE — 80050 GENERAL HEALTH PANEL: CPT | Performed by: PHYSICIAN ASSISTANT

## 2022-07-18 RX ORDER — PANTOPRAZOLE SODIUM 20 MG/1
TABLET, DELAYED RELEASE ORAL
Qty: 90 TABLET | Refills: 3 | Status: SHIPPED | OUTPATIENT
Start: 2022-07-18

## 2022-07-20 ENCOUNTER — TELEPHONE (OUTPATIENT)
Dept: SLEEP MEDICINE | Facility: HOSPITAL | Age: 47
End: 2022-07-20

## 2022-07-20 RX ORDER — ERENUMAB-AOOE 140 MG/ML
1 INJECTION, SOLUTION SUBCUTANEOUS
Qty: 3 PEN | Refills: 3 | Status: SHIPPED | OUTPATIENT
Start: 2022-07-20

## 2022-07-20 NOTE — TELEPHONE ENCOUNTER
Pt wife states medications were sent to wrong location.  Pt wife requesting it to be resent.  Thanks

## 2022-07-20 NOTE — TELEPHONE ENCOUNTER
Caller: ESTEFANI MCLAUGHLIN    Relationship: Emergency Contact    Best call back number: [phone]    Requested Prescriptions:   Requested Prescriptions     Pending Prescriptions Disp Refills   • Erenumab-aooe (Aimovig) 140 MG/ML prefilled syringe 3 pen 3     Sig: Inject 1 mL under the skin into the appropriate area as directed Every 30 (Thirty) Days. For migraines        Pharmacy where request should be sent: [facility] [address] - [phone]  - [phone] FX     Additional details provided by patient: PATIENT IS OUT  Does the patient have less than a 3 day supply:  [x] Yes  [] No    PLEASE FAX IT [phone] AND MUST HAVE THIS ADDRESS ON IT:  [id number], [place]  [id number]    Carlos Kahn Rep   07/20/22 11:25 EDT

## 2022-08-01 ENCOUNTER — TELEPHONE (OUTPATIENT)
Dept: FAMILY MEDICINE CLINIC | Facility: CLINIC | Age: 47
End: 2022-08-01

## 2022-08-01 RX ORDER — EPINEPHRINE 0.3 MG/.3ML
0.3 INJECTION SUBCUTANEOUS ONCE
Qty: 1 EACH | Refills: 11 | Status: SHIPPED | OUTPATIENT
Start: 2022-08-01 | End: 2022-08-01

## 2022-08-01 NOTE — TELEPHONE ENCOUNTER
----- Message from Colby Duenas sent at 2022  7:29 PM EDT -----  Regarding: Epi pen  I forgot to ask you about refilling my epi pen. The current one I have is . Auvi-Q .3mg is the last one prescribed. I am allergic to most flying insects and other natural and environmental things.

## 2022-10-05 ENCOUNTER — OFFICE VISIT (OUTPATIENT)
Dept: FAMILY MEDICINE CLINIC | Facility: CLINIC | Age: 47
End: 2022-10-05

## 2022-10-05 VITALS
HEIGHT: 68 IN | SYSTOLIC BLOOD PRESSURE: 110 MMHG | OXYGEN SATURATION: 99 % | HEART RATE: 82 BPM | RESPIRATION RATE: 16 BRPM | WEIGHT: 176 LBS | TEMPERATURE: 97.5 F | DIASTOLIC BLOOD PRESSURE: 60 MMHG | BODY MASS INDEX: 26.67 KG/M2

## 2022-10-05 DIAGNOSIS — F51.01 PRIMARY INSOMNIA: ICD-10-CM

## 2022-10-05 DIAGNOSIS — K58.0 IRRITABLE BOWEL SYNDROME WITH DIARRHEA: Primary | ICD-10-CM

## 2022-10-05 DIAGNOSIS — R63.4 WEIGHT LOSS: ICD-10-CM

## 2022-10-05 PROCEDURE — 99214 OFFICE O/P EST MOD 30 MIN: CPT | Performed by: PHYSICIAN ASSISTANT

## 2022-10-05 RX ORDER — ZOLPIDEM TARTRATE 10 MG/1
TABLET ORAL
Qty: 30 TABLET | Refills: 2 | Status: SHIPPED | OUTPATIENT
Start: 2022-10-05 | End: 2023-01-09 | Stop reason: SDUPTHER

## 2022-10-05 RX ORDER — HYOSCYAMINE SULFATE 0.125 MG
0.12 TABLET ORAL EVERY 6 HOURS PRN
Qty: 60 TABLET | Refills: 5 | Status: SHIPPED | OUTPATIENT
Start: 2022-10-05

## 2022-10-05 NOTE — PROGRESS NOTES
Subjective   Colby Duenas is a 47 y.o. male.     History of Present Illness   Cloby Duenas 47 y.o. male who presents for evaluation of adominal cramping and diarrhea. Symptoms have been present for several months .  The condition is aggravated by eating any food . he is experiencing some undigested food and loose watery stools 3-4 per day.  Worse 6 mos. Can wake him up.  Alleviating factors are imodium----then can make him constipated  with some help, but still symptoms . Patient denies Celiac disease.  his past medical history is notable for IBS.  Patient denies recent travel.    Having diarrhea---down 12 lbs.  He is already on IBgard, probiotic, Bentyl, Benefiber  Has had this in past and Dr Chowdary Rx Xifaxan 550mg TID X 14 days and worked.  Last CT abdomen and pelvis was 12/9/2020 negative, HIDA scan 2/25/2019 normal/  Last EGD and colonoscopy was 3-1-19  Post-op Diagnosis: - Normal esophagus.  - Normal stomach. Biopsied.  - Normal examined duodenum. Biopsied.   Post-op Diagnosis: - The examined portion of the ileum was normal.  - The rectum, sigmoid colon, descending colon,   transverse colon, ascending colon and cecum are normal.   Biopsied.  - The distal rectum and anal verge are normal on   retroflexion view.    DIAGNOSIS:   A.  SMALL BOWEL BIOPSY:        UNREMARKABLE SMALL INTESTINAL MUCOSA.     B.  RANDOM COLON BIOPSY:        UNREMARKABLE COLONIC MUCOSA.       PATHOLOGIST COMMENT:   The small bowel biopsy shows no histologic changes to suggest celiac disease.       Use Cpap/Bipap every night  Sees cardio hx PSVT  Prior notes: ---  On beta blocker for hx sinus tachycardia; hx syncope  Loop recorder----released from cardio-----DR Moreno   Echo stress test 12-23-16  Conclusions:   Global left ventricular wall motion and contractility are within normal   limits.   The EF 4ch was calculated at 58%.   Normal left ventricular diastolic filling is observed.   Definity was used to optimize study.    Normal Sress Echo   Normal Stress EKG    Has been to neurology in the past and saw Dr. Cordova for migraines and hx concussions.  Last visit he reported that he was not taking his prevention medicine due to insurance.  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      Colby Duenas 47 y.o. male presents for follow up of insomnia with onset of symptoms years ago. Patient describes symptoms as early morning awakening, frequent night time awakening, difficulty falling asleep and non-restful sleep. Patient has found no relief with Trazodone, Tylenol PM OTC, Advil PM OTC, avoiding exercise prior to bedtime, going to bed at the same time every night and getting up at the same time and avoiding naps. Associated symptoms include: frustration .  Also note patient's failed amitriptyline and hydroxyzine. patient denies history of addiction Symptoms have worsened.  The patient has failed multiple OTC medications for insomnia.  They are well controlled on current Rx and will continue to try to take Rx PRN.  He will use the lowest effective dose.  The patient has read and signed the [facility] Controlled Substance Contract.  I will continue to see patient for regular follow up appointments and be prescribed the lowest effective dose.  NELY has been reviewed by me and is updated every 3 months. The patient is aware of the potential for addiction and dependence.  He denies that Ambien (Zolpidem) causes excessive daytime drowsiness and sleep walking.  Patient voices understanding to take Ambien (Zolpidem) and go straight to bed. Patient must be able to sleep 7 hours or more when taking this and no alcohol.  Patient will hold Rx and contact me if they experience any impaired mental alertness the  next day.      Weight June 7 was 193 and today is 176;  Down 17 lbs  The following portions of the patient's history were reviewed and updated as appropriate: allergies, current medications, past family history, past medical history, past social history, past surgical history and problem list.    Review of Systems   Constitutional: Positive for fatigue. Negative for fever.   HENT: Negative for nosebleeds and trouble swallowing.    Eyes: Negative for visual disturbance.   Respiratory: Negative for choking and stridor.    Cardiovascular: Negative for chest pain.   Gastrointestinal: Positive for abdominal pain, constipation, diarrhea and nausea. Negative for blood in stool and vomiting.   Endocrine: Negative for polydipsia.   Genitourinary: Negative for dysuria, frequency, genital sores and hematuria.   Musculoskeletal: Positive for arthralgias. Negative for joint swelling and myalgias.   Skin: Negative for color change and rash.   Allergic/Immunologic: Negative for immunocompromised state.   Neurological: Positive for headaches. Negative for seizures, facial asymmetry and speech difficulty.   Hematological: Negative for adenopathy.   Psychiatric/Behavioral: Negative for behavioral problems, self-injury and suicidal ideas.       Objective   Physical Exam  Vitals and nursing note reviewed.   Constitutional:       General: He is not in acute distress.     Appearance: He is well-developed. He is not diaphoretic.   HENT:      Head: Normocephalic.   Eyes:      Conjunctiva/sclera: Conjunctivae normal.      Pupils: Pupils are equal, round, and reactive to light.   Cardiovascular:      Rate and Rhythm: Normal rate and regular rhythm.      Heart sounds: Normal heart sounds. No murmur heard.  Pulmonary:      Effort: Pulmonary effort is normal.      Breath sounds: Normal breath sounds. No rales.   Abdominal:      Palpations: Abdomen is soft.      Tenderness: There is abdominal tenderness.      Comments: Lower abd    Musculoskeletal:         General: Normal range of motion.      Cervical back: Normal range of motion and neck supple.   Skin:     General: Skin is warm and dry.      Findings: No rash.   Neurological:      Mental Status: He is alert and oriented to person, place, and time.   Psychiatric:         Mood and Affect: Affect is not inappropriate.         Behavior: Behavior normal.         Thought Content: Thought content normal.         Judgment: Judgment normal.         Assessment & Plan   Diagnoses and all orders for this visit:    1. Irritable bowel syndrome with diarrhea (Primary)  -     Comprehensive Metabolic Panel  -     CBC & Differential  -     CT Abdomen Pelvis With Contrast; Future  -     Ambulatory Referral to Gastroenterology  -     Clostridioides difficile Toxin, PCR - Stool, Per Rectum  -     Stool Culture (Reference Lab) - Stool, Per Rectum  -     Ova & Parasite Examination - Stool, Per Rectum    2. Primary insomnia  -     Comprehensive Metabolic Panel  -     CBC & Differential  -     zolpidem (AMBIEN) 10 MG tablet; 1/2 -1 PO Q HS prn insomnia  Dispense: 30 tablet; Refill: 2    3. Weight loss  -     Comprehensive Metabolic Panel  -     CBC & Differential  -     CT Abdomen Pelvis With Contrast; Future  -     Ambulatory Referral to Gastroenterology  -     Clostridioides difficile Toxin, PCR - Stool, Per Rectum  -     Stool Culture (Reference Lab) - Stool, Per Rectum  -     Ova & Parasite Examination - Stool, Per Rectum    Other orders  -     hyoscyamine (ANASPAZ,LEVSIN) 0.125 MG tablet; Take 1 tablet by mouth Every 6 (Six) Hours As Needed for Cramping.  Dispense: 60 tablet; Refill: 5        No help Bentyl--- we will try Levsin  Will refer to GI and get stool studies we will get CMP and CBC to make sure he is not become anemic  Will order CT abdomen and pelvis with contrast due to his weight loss and continued diarrhea  Will also start Ambien for insomnia and sign controlled substance contract and reviewed  failed medications  Follow-up 3 months  On IB guard       Answers for HPI/ROS submitted by the patient on 9/28/2022  What is the primary reason for your visit?: Abdominal Pain

## 2022-10-18 ENCOUNTER — LAB (OUTPATIENT)
Dept: LAB | Facility: HOSPITAL | Age: 47
End: 2022-10-18

## 2022-10-18 ENCOUNTER — TRANSCRIBE ORDERS (OUTPATIENT)
Dept: ADMINISTRATIVE | Facility: HOSPITAL | Age: 47
End: 2022-10-18

## 2022-10-18 DIAGNOSIS — K58.0 IRRITABLE BOWEL SYNDROME WITH DIARRHEA: Primary | ICD-10-CM

## 2022-10-18 DIAGNOSIS — R63.4 LOSS OF WEIGHT: ICD-10-CM

## 2022-10-18 DIAGNOSIS — F51.01 PRIMARY INSOMNIA: ICD-10-CM

## 2022-10-18 DIAGNOSIS — K58.0 IRRITABLE BOWEL SYNDROME WITH DIARRHEA: ICD-10-CM

## 2022-10-18 LAB
ALBUMIN SERPL-MCNC: 4.6 G/DL (ref 3.5–5.2)
ALBUMIN/GLOB SERPL: 1.8 G/DL
ALP SERPL-CCNC: 67 U/L (ref 39–117)
ALT SERPL W P-5'-P-CCNC: 17 U/L (ref 1–41)
ANION GAP SERPL CALCULATED.3IONS-SCNC: 8.8 MMOL/L (ref 5–15)
AST SERPL-CCNC: 18 U/L (ref 1–40)
BASOPHILS # BLD AUTO: 0.08 10*3/MM3 (ref 0–0.2)
BASOPHILS NFR BLD AUTO: 1.1 % (ref 0–1.5)
BILIRUB SERPL-MCNC: 0.5 MG/DL (ref 0–1.2)
BUN SERPL-MCNC: 12 MG/DL (ref 6–20)
BUN/CREAT SERPL: 11 (ref 7–25)
CALCIUM SPEC-SCNC: 9.6 MG/DL (ref 8.6–10.5)
CHLORIDE SERPL-SCNC: 100 MMOL/L (ref 98–107)
CO2 SERPL-SCNC: 27.2 MMOL/L (ref 22–29)
CREAT SERPL-MCNC: 1.09 MG/DL (ref 0.76–1.27)
DEPRECATED RDW RBC AUTO: 40.6 FL (ref 37–54)
EGFRCR SERPLBLD CKD-EPI 2021: 83.7 ML/MIN/1.73
EOSINOPHIL # BLD AUTO: 0.23 10*3/MM3 (ref 0–0.4)
EOSINOPHIL NFR BLD AUTO: 3 % (ref 0.3–6.2)
ERYTHROCYTE [DISTWIDTH] IN BLOOD BY AUTOMATED COUNT: 12.9 % (ref 12.3–15.4)
GLOBULIN UR ELPH-MCNC: 2.6 GM/DL
GLUCOSE SERPL-MCNC: 92 MG/DL (ref 65–99)
HCT VFR BLD AUTO: 43.2 % (ref 37.5–51)
HGB BLD-MCNC: 15 G/DL (ref 13–17.7)
IMM GRANULOCYTES # BLD AUTO: 0.03 10*3/MM3 (ref 0–0.05)
IMM GRANULOCYTES NFR BLD AUTO: 0.4 % (ref 0–0.5)
LYMPHOCYTES # BLD AUTO: 2.6 10*3/MM3 (ref 0.7–3.1)
LYMPHOCYTES NFR BLD AUTO: 34.3 % (ref 19.6–45.3)
MCH RBC QN AUTO: 30.5 PG (ref 26.6–33)
MCHC RBC AUTO-ENTMCNC: 34.7 G/DL (ref 31.5–35.7)
MCV RBC AUTO: 87.8 FL (ref 79–97)
MONOCYTES # BLD AUTO: 0.7 10*3/MM3 (ref 0.1–0.9)
MONOCYTES NFR BLD AUTO: 9.2 % (ref 5–12)
NEUTROPHILS NFR BLD AUTO: 3.93 10*3/MM3 (ref 1.7–7)
NEUTROPHILS NFR BLD AUTO: 52 % (ref 42.7–76)
NRBC BLD AUTO-RTO: 0 /100 WBC (ref 0–0.2)
PLATELET # BLD AUTO: 125 10*3/MM3 (ref 140–450)
PMV BLD AUTO: 12.6 FL (ref 6–12)
POTASSIUM SERPL-SCNC: 4.6 MMOL/L (ref 3.5–5.2)
PROT SERPL-MCNC: 7.2 G/DL (ref 6–8.5)
RBC # BLD AUTO: 4.92 10*6/MM3 (ref 4.14–5.8)
SODIUM SERPL-SCNC: 136 MMOL/L (ref 136–145)
WBC NRBC COR # BLD: 7.57 10*3/MM3 (ref 3.4–10.8)

## 2022-10-18 PROCEDURE — 36415 COLL VENOUS BLD VENIPUNCTURE: CPT

## 2022-10-18 PROCEDURE — 85025 COMPLETE CBC W/AUTO DIFF WBC: CPT

## 2022-10-18 PROCEDURE — 80053 COMPREHEN METABOLIC PANEL: CPT

## 2022-10-24 ENCOUNTER — OFFICE VISIT (OUTPATIENT)
Dept: GASTROENTEROLOGY | Facility: CLINIC | Age: 47
End: 2022-10-24

## 2022-10-24 VITALS
BODY MASS INDEX: 26.56 KG/M2 | DIASTOLIC BLOOD PRESSURE: 73 MMHG | WEIGHT: 179.3 LBS | HEIGHT: 69 IN | SYSTOLIC BLOOD PRESSURE: 109 MMHG | TEMPERATURE: 97.8 F | HEART RATE: 63 BPM

## 2022-10-24 DIAGNOSIS — K58.0 IRRITABLE BOWEL SYNDROME WITH DIARRHEA: Chronic | ICD-10-CM

## 2022-10-24 DIAGNOSIS — R63.4 WEIGHT LOSS, ABNORMAL: ICD-10-CM

## 2022-10-24 DIAGNOSIS — R10.9 ABDOMINAL CRAMPING: Primary | Chronic | ICD-10-CM

## 2022-10-24 PROCEDURE — 99204 OFFICE O/P NEW MOD 45 MIN: CPT | Performed by: INTERNAL MEDICINE

## 2022-10-24 NOTE — PROGRESS NOTES
"Chief Complaint   Patient presents with   • IBS with Diarrhea   • Weight Loss   • Abdominal Pain       Subjective     HPI    Colby Duenas is a 47 y.o. male with a past medical history noted below who presents for cramping, diarrhea, and weight loss.  He he says he has had \"stomach issues the majority of his life.\"  He frequently has cramping and diarrhea regardless of what he eats.  Averages 4-5 BMs daily after meals.  He has had weight loss due to avoiding eating to avoid symptoms, also has gotten to the point that eating doesn't appeal to him.  He has lost about 20# in a 6 month period.    He was previously followed by Dr. Chowdary at [place].    He had been on bentyl for years with \"hit and miss\" results.  Takes IB Herb TID which does help.  He took rifaximin a few years ago but seemed to get some improvement.  Imodium is hit and miss, sometimes works and gives him constipation and sometimes doesn't work.  Recently prescribed hyoscyamine by his PCP but does not feel it is changed much    Hx 2 inguinal hernia repairs.      Mother with colitis.  No GI malignancies.    No smoking, no excess ETOH.      Avoids dairy, problematic foods.      CT scan to be done later this week.        3/2019 EGD and colonoscopy pathology  Received: 3/1/2019     [facility] [place] [id number]     DIAGNOSIS:   A.  SMALL BOWEL BIOPSY:        UNREMARKABLE SMALL INTESTINAL MUCOSA.     B.  RANDOM COLON BIOPSY:        UNREMARKABLE COLONIC MUCOSA.       PATHOLOGIST COMMENT:   The small bowel biopsy shows no histologic changes to suggest celiac disease.       Electronically Signed Out on 3/2/2019                Today's visit was in the office.  Both the patient and I were wearing face masks and proper hand hygiene was performed before and after the physical exam.           Current Outpatient " Medications:   •  eletriptan (RELPAX) 20 MG tablet, Take 1 tablet by mouth 1 (One) Time As Needed for Migraine for up to 1 dose. may repeat in 2 hours if necessary, Disp: 15 tablet, Rfl: 11  •  Erenumab-aooe (Aimovig) 140 MG/ML prefilled syringe, Inject 1 mL under the skin into the appropriate area as directed Every 30 (Thirty) Days. For migraines, Disp: 3 pen, Rfl: 3  •  FIBER COMPLETE tablet, Take 1 tablet by mouth 2 (Two) Times a Day., Disp: , Rfl:   •  fluticasone (FLONASE) 50 MCG/ACT nasal spray, 1 spray into the nostril(s) as directed by provider Daily As Needed., Disp: , Rfl:   •  hyoscyamine (ANASPAZ,LEVSIN) 0.125 MG tablet, Take 1 tablet by mouth Every 6 (Six) Hours As Needed for Cramping., Disp: 60 tablet, Rfl: 5  •  melatonin 3 MG tablet, Take  by mouth Every Night. WITH CBD OIL/PT HOLDING FOR SURGERY, Disp: , Rfl:   •  metoprolol succinate XL (TOPROL-XL) 50 MG 24 hr tablet, Take 1 tablet by mouth Every Morning. For heart rate, Disp: 90 tablet, Rfl: 3  •  montelukast (SINGULAIR) 10 MG tablet, Take 1 tablet by mouth Every Night., Disp: 90 tablet, Rfl: 3  •  ondansetron (Zofran) 4 MG tablet, Take 1 tablet by mouth Every 8 (Eight) Hours As Needed for Nausea or Vomiting., Disp: 20 tablet, Rfl: 0  •  pantoprazole (PROTONIX) 20 MG EC tablet, TAKE ONE TABLET BY MOUTH DAILY FOR GERD, Disp: 90 tablet, Rfl: 3  •  Peppermint Oil (IBGARD PO), Take 1 capsule by mouth 2 (Two) Times a Day. PT HOLDING FOR SURGERY, Disp: , Rfl:   •  Probiotic Product (ALIGN PO), Take 1 capsule by mouth Every Morning. PT HOLDING FOR SURGERY, Disp: , Rfl:   •  zolpidem (AMBIEN) 10 MG tablet, 1/2 -1 PO Q HS prn insomnia, Disp: 30 tablet, Rfl: 2  •  dicyclomine (BENTYL) 20 MG tablet, Take 1 tablet by mouth 3 (Three) Times a Day As Needed (IBS)., Disp: 90 tablet, Rfl: 11  •  neomycin-polymyxin-hydrocortisone (CORTISPORIN) 3.5-14512-3 otic suspension, Administer 3 drops into both ears 4 (Four) Times a Day. For 7 days, Disp: 10 mL, Rfl: 11  •   riFAXIMin (XIFAXAN) 550 MG tablet, Take 1 tablet by mouth Every 8 (Eight) Hours for 14 days., Disp: 42 tablet, Rfl: 0      Objective     Vitals:    10/24/22 0836   BP: 109/73   Pulse: 63   Temp: 97.8 °F (36.6 °C)         10/24/22  0836   Weight: 81.3 kg (179 lb 4.8 oz)     Body mass index is 26.48 kg/m².    Physical Exam  Constitutional:       General: He is not in acute distress.  Pulmonary:      Effort: Pulmonary effort is normal.   Neurological:      Mental Status: He is alert and oriented to person, place, and time.   Psychiatric:         Mood and Affect: Mood normal.         Behavior: Behavior normal.         Thought Content: Thought content normal.         Judgment: Judgment normal.             WBC   Date Value Ref Range Status   10/18/2022 7.57 3.40 - 10.80 10*3/mm3 Final   06/15/2021 7.07 3.40 - 10.80 10*3/mm3 Final   12/09/2020 7.76 4.5 - 11.0 10*3/uL Final     RBC   Date Value Ref Range Status   10/18/2022 4.92 4.14 - 5.80 10*6/mm3 Final   06/15/2021 5.32 4.14 - 5.80 10*6/mm3 Final   12/09/2020 4.48 (L) 4.5 - 5.9 10*6/uL Final     Hemoglobin   Date Value Ref Range Status   10/18/2022 15.0 13.0 - 17.7 g/dL Final   12/09/2020 13.7 13.5 - 17.5 g/dL Final     Hematocrit   Date Value Ref Range Status   10/18/2022 43.2 37.5 - 51.0 % Final   12/09/2020 39.8 (L) 41.0 - 53.0 % Final     MCV   Date Value Ref Range Status   10/18/2022 87.8 79.0 - 97.0 fL Final   12/09/2020 88.8 80.0 - 100.0 fL Final     MCH   Date Value Ref Range Status   10/18/2022 30.5 26.6 - 33.0 pg Final   12/09/2020 30.6 26.0 - 34.0 pg Final     MCHC   Date Value Ref Range Status   10/18/2022 34.7 31.5 - 35.7 g/dL Final   12/09/2020 34.4 31.0 - 37.0 g/dL Final     RDW   Date Value Ref Range Status   10/18/2022 12.9 12.3 - 15.4 % Final   12/09/2020 12.0 12.0 - 16.8 % Final     RDW-SD   Date Value Ref Range Status   10/18/2022 40.6 37.0 - 54.0 fl Final     MPV   Date Value Ref Range Status   10/18/2022 12.6 (H) 6.0 - 12.0 fL Final   12/09/2020  13.5 (H) 8.4 - 12.4 fL Final     Platelets   Date Value Ref Range Status   10/18/2022 125 (L) 140 - 450 10*3/mm3 Final   12/09/2020 128 (L) 140 - 440 10*3/uL Final     Platelets (Citrated Blood)   Date Value Ref Range Status   07/15/2022 279 140 - 450 10*3/mm3 Final     Neutrophil Rel %   Date Value Ref Range Status   12/09/2020 54.0 45 - 80 % Final     Neutrophil %   Date Value Ref Range Status   10/18/2022 52.0 42.7 - 76.0 % Final     Lymphocyte Rel %   Date Value Ref Range Status   12/09/2020 30.5 15 - 50 % Final     Lymphocyte %   Date Value Ref Range Status   10/18/2022 34.3 19.6 - 45.3 % Final     Monocyte Rel %   Date Value Ref Range Status   12/09/2020 10.2 0 - 15 % Final     Monocyte %   Date Value Ref Range Status   10/18/2022 9.2 5.0 - 12.0 % Final     Eosinophil %   Date Value Ref Range Status   10/18/2022 3.0 0.3 - 6.2 % Final   12/09/2020 4.1 0 - 7 % Final     Basophil Rel %   Date Value Ref Range Status   12/09/2020 0.9 0 - 2 % Final     Basophil %   Date Value Ref Range Status   10/18/2022 1.1 0.0 - 1.5 % Final     Immature Grans %   Date Value Ref Range Status   10/18/2022 0.4 0.0 - 0.5 % Final   12/09/2020 0.3 0.0 - 1.0 % Final     Neutrophils Absolute   Date Value Ref Range Status   12/09/2020 4.19 2.0 - 8.8 10*3/uL Final     Neutrophils, Absolute   Date Value Ref Range Status   10/18/2022 3.93 1.70 - 7.00 10*3/mm3 Final     Lymphocytes Absolute   Date Value Ref Range Status   12/09/2020 2.37 0.7 - 5.5 10*3/uL Final     Lymphocytes, Absolute   Date Value Ref Range Status   10/18/2022 2.60 0.70 - 3.10 10*3/mm3 Final     Monocytes Absolute   Date Value Ref Range Status   12/09/2020 0.79 0.0 - 1.7 10*3/uL Final     Monocytes, Absolute   Date Value Ref Range Status   10/18/2022 0.70 0.10 - 0.90 10*3/mm3 Final     Eosinophils Absolute   Date Value Ref Range Status   12/09/2020 0.32 0.0 - 0.8 10*3/uL Final     Eosinophils, Absolute   Date Value Ref Range Status   10/18/2022 0.23 0.00 - 0.40 10*3/mm3  Final     Basophils Absolute   Date Value Ref Range Status   12/09/2020 0.07 0.0 - 0.2 10*3/uL Final     Basophils, Absolute   Date Value Ref Range Status   10/18/2022 0.08 0.00 - 0.20 10*3/mm3 Final     Immature Grans, Absolute   Date Value Ref Range Status   10/18/2022 0.03 0.00 - 0.05 10*3/mm3 Final   12/09/2020 0.02 0.00 - 0.10 10*3/uL Final     nRBC   Date Value Ref Range Status   10/18/2022 0.0 0.0 - 0.2 /100 WBC Final       Lab Results   Component Value Date    GLUCOSE 92 10/18/2022    BUN 12 10/18/2022    CREATININE 1.09 10/18/2022    EGFRIFNONA 73 11/17/2021    EGFRIFAFRI 96 06/15/2021    BCR 11.0 10/18/2022    CO2 27.2 10/18/2022    CALCIUM 9.6 10/18/2022    PROTENTOTREF 8.0 06/15/2021    ALBUMIN 4.60 10/18/2022    LABIL2 2.0 06/15/2021    AST 18 10/18/2022    ALT 17 10/18/2022     12/2020 CT abd    IMPRESSION:     No acute intra-abdominal findings.     Dictated by: Ortiz Hamilton D.O.     Images and Report reviewed and interpreted by: Ortiz Hamilton D.O.       I personally reviewed data as detailed below:     The labs listed above.    The radiology studies listed above.    Office notes from: 10/5/22 pcp    Endoscopy procedures and pathology from: 3/1/19 EGD and colonoscopy    Assessment and Plan    1. Abdominal cramping     2. Irritable bowel syndrome with diarrhea    3. Weight loss, abnormal    Plan  Issues are chronic and longstanding.  He has previously had an extensive work-up with Dr. Chowdary at [facility].  He reports that he got reasonable relief with rifaximin so we will try another 2-week course for IBS-D.  I have additionally told him to try 2 mg loperamide with every meal to see if this improves his bowel movements.  We will continue to keep an eye on his weight.  He is only down about 4 pounds from July.       Diagnoses and all orders for this visit:    1. Abdominal cramping (Primary)    2. Irritable bowel syndrome with diarrhea    3. Weight loss, abnormal    Other orders  -      riFAXIMin (XIFAXAN) 550 MG tablet; Take 1 tablet by mouth Every 8 (Eight) Hours for 14 days.  Dispense: 42 tablet; Refill: 0          I have discussed the above plan with the patient.  They verbalize understanding and are in agreement with the plan.  They have been advised to contact the office for any questions, concerns, or changes related to their health.    Dictated utilizing Dragon dictation

## 2022-10-28 ENCOUNTER — HOSPITAL ENCOUNTER (OUTPATIENT)
Dept: CT IMAGING | Facility: HOSPITAL | Age: 47
Discharge: HOME | End: 2022-10-28
Admitting: PHYSICIAN ASSISTANT

## 2022-10-28 DIAGNOSIS — R63.4 WEIGHT LOSS: ICD-10-CM

## 2022-10-28 DIAGNOSIS — K58.0 IRRITABLE BOWEL SYNDROME WITH DIARRHEA: ICD-10-CM

## 2022-10-28 PROCEDURE — 74177 CT ABD & PELVIS W/CONTRAST: CPT

## 2022-10-28 PROCEDURE — 25010000002 IOPAMIDOL 61 % SOLUTION: Performed by: PHYSICIAN ASSISTANT

## 2022-10-28 RX ADMIN — IOPAMIDOL 85 ML: 612 INJECTION, SOLUTION INTRAVENOUS at 15:28

## 2022-11-03 ENCOUNTER — TELEPHONE (OUTPATIENT)
Dept: GASTROENTEROLOGY | Facility: CLINIC | Age: 47
End: 2022-11-03

## 2022-11-03 NOTE — TELEPHONE ENCOUNTER
Attmepted to submit PA via cover my meds however it stated I needed to call [employer] to complete the priot authorization. I called and spoke to Zoe and she stated that she would send information to her Clinical team and they would fax over the prior authorization.

## 2022-11-04 NOTE — TELEPHONE ENCOUNTER
I received PA paperwork and filled it out. Requires signature from Dr. Parker and then I will fax it to [facility].

## 2022-11-22 RX ORDER — RIFAXIMIN 550 MG/1
TABLET ORAL
Qty: 42 TABLET | Refills: 0 | OUTPATIENT
Start: 2022-11-22

## 2022-12-09 ENCOUNTER — OFFICE VISIT (OUTPATIENT)
Dept: GASTROENTEROLOGY | Facility: CLINIC | Age: 47
End: 2022-12-09

## 2022-12-09 VITALS
BODY MASS INDEX: 26.73 KG/M2 | WEIGHT: 176.4 LBS | HEIGHT: 68 IN | TEMPERATURE: 97.3 F | DIASTOLIC BLOOD PRESSURE: 72 MMHG | OXYGEN SATURATION: 97 % | HEART RATE: 75 BPM | SYSTOLIC BLOOD PRESSURE: 111 MMHG

## 2022-12-09 DIAGNOSIS — K58.0 IRRITABLE BOWEL SYNDROME WITH DIARRHEA: Primary | ICD-10-CM

## 2022-12-09 PROCEDURE — 99213 OFFICE O/P EST LOW 20 MIN: CPT | Performed by: PHYSICIAN ASSISTANT

## 2022-12-09 NOTE — PROGRESS NOTES
Chief Complaint  Bloated and Diarrhea    Subjective        History of Present Illness  Colby Duenas is a  47 y.o. male here for follow-up for IBS-D, weight loss and abdominal pain.  He is a patient of Dr. Parker was last seen in the clinic on 10/24/2022.    He continues to complain of abdominal bloating but notes that his diarrhea has improved and occurring maybe once a week following round of Xifaxan. He also found the IBGuard to be helpful.     Last colonoscopy in March 2019 at [facility]: unremarkable small bowel and random colon biopsies.       Past Medical History:   Diagnosis Date   • Alcoholism ([facility]) 2012    Sober since 7/6/2018   • Allergic    • Anxiety    • Arthritis    • Colon polyp 2010   • Dislocation, shoulder 06/1996   • Fracture, clavicle 02/1986   • GERD (gastroesophageal reflux disease)    • Hernia 1992   • History of colon polyps    • Hypertension 2016   • IBS (irritable bowel syndrome)    • Knee swelling 10/1994   • Low back strain 07/1992   • Migraines    • Periarthritis of shoulder 04/2014   • PSVT (paroxysmal supraventricular tachycardia) ([facility])    • Seasonal allergies    • Shoulder pain    • Sleep apnea     CPAP   • Stress fracture 02/1995   • Tear of meniscus of knee 02/1995   • Tendinitis of knee 02/1995       Past Surgical History:   Procedure Laterality Date   • CARDIAC CATHETERIZATION     • COLONOSCOPY     • HAND SURGERY  11/1990   • HERNIA REPAIR     • INGUINAL HERNIA REPAIR      X3   • SHOULDER ACROMIOCLAVICULAR JOINT REPAIR Right    • SHOULDER ARTHROSCOPY Right 11/24/2021    Procedure: SHOULDER ARTHROSCOPY WITH SUBACROMIAL DECOMPRESSION, DEBRIDEMENT  BURSAL SIDED ROTATOR CUFF;  Surgeon: Radha Caruso MD;  [facility] [facility] [place] [facility]  Service: Orthopedics;  Laterality: Right;   • SHOULDER SURGERY Right 04/2014   • TRIGGER POINT INJECTION  Multiple   • UPPER GASTROINTESTINAL ENDOSCOPY  2018       Family History   Problem Relation Age of Onset   • Hypertension Mother    • Arthritis  Mother    • Irritable bowel syndrome Mother    • Hypertension Father    • Irritable bowel syndrome Maternal Grandfather    • Malig Hyperthermia Neg Hx        Social History     Socioeconomic History   • Marital status:    Tobacco Use   • Smoking status: Former     Packs/day: 2.00     Years: 15.00     Pack years: 30.00     Types: Cigarettes     Start date: 7/10/1990     Quit date: 2017     Years since quittin.5   • Smokeless tobacco: Former     Types: Chew     Quit date: 2014   • Tobacco comments:     Chewing tobacco   Vaping Use   • Vaping Use: Never used   Substance and Sexual Activity   • Alcohol use: Not Currently     Comment: Sober since 2018   • Drug use: Never   • Sexual activity: Yes     Partners: Female     Birth control/protection: None       Allergies   Allergen Reactions   • Oxycodone-Acetaminophen Hives       Current Outpatient Medications on File Prior to Visit   Medication Sig Dispense Refill   • dicyclomine (BENTYL) 20 MG tablet Take 1 tablet by mouth 3 (Three) Times a Day As Needed (IBS). 90 tablet 11   • eletriptan (RELPAX) 20 MG tablet Take 1 tablet by mouth 1 (One) Time As Needed for Migraine for up to 1 dose. may repeat in 2 hours if necessary 15 tablet 11   • Erenumab-aooe (Aimovig) 140 MG/ML prefilled syringe Inject 1 mL under the skin into the appropriate area as directed Every 30 (Thirty) Days. For migraines 3 pen 3   • FIBER COMPLETE tablet Take 1 tablet by mouth 2 (Two) Times a Day.     • fluticasone (FLONASE) 50 MCG/ACT nasal spray 1 spray into the nostril(s) as directed by provider Daily As Needed.     • hyoscyamine (ANASPAZ,LEVSIN) 0.125 MG tablet Take 1 tablet by mouth Every 6 (Six) Hours As Needed for Cramping. 60 tablet 5   • melatonin 3 MG tablet Take  by mouth Every Night. WITH CBD OIL/PT HOLDING FOR SURGERY     • metoprolol succinate XL (TOPROL-XL) 50 MG 24 hr tablet Take 1 tablet by mouth Every Morning. For heart rate 90 tablet 3   • montelukast (SINGULAIR)  "10 MG tablet Take 1 tablet by mouth Every Night. 90 tablet 3   • neomycin-polymyxin-hydrocortisone (CORTISPORIN) 3.5-24052-9 otic suspension Administer 3 drops into both ears 4 (Four) Times a Day. For 7 days 10 mL 11   • ondansetron (Zofran) 4 MG tablet Take 1 tablet by mouth Every 8 (Eight) Hours As Needed for Nausea or Vomiting. 20 tablet 0   • pantoprazole (PROTONIX) 20 MG EC tablet TAKE ONE TABLET BY MOUTH DAILY FOR GERD 90 tablet 3   • Peppermint Oil (IBGARD PO) Take 1 capsule by mouth 2 (Two) Times a Day. PT HOLDING FOR SURGERY     • Probiotic Product (ALIGN PO) Take 1 capsule by mouth Every Morning. PT HOLDING FOR SURGERY     • zolpidem (AMBIEN) 10 MG tablet 1/2 -1 PO Q HS prn insomnia 30 tablet 2     No current facility-administered medications on file prior to visit.       Review of Systems     Objective   Vital Signs:   /72   Pulse 75   Temp 97.3 °F (36.3 °C)   Ht 172.7 cm (68\")   Wt 80 kg (176 lb 6.4 oz)   SpO2 97%   BMI 26.82 kg/m²       Physical Exam  Vitals and nursing note reviewed.   Constitutional:       General: He is not in acute distress.     Appearance: Normal appearance. He is not ill-appearing.   HENT:      Head: Normocephalic and atraumatic.      Right Ear: External ear normal.      Left Ear: External ear normal.   Eyes:      General: No scleral icterus.     Conjunctiva/sclera: Conjunctivae normal.      Pupils: Pupils are equal, round, and reactive to light.   Cardiovascular:      Rate and Rhythm: Normal rate and regular rhythm.      Heart sounds: Normal heart sounds.   Pulmonary:      Effort: Pulmonary effort is normal.      Breath sounds: Normal breath sounds.   Abdominal:      General: Abdomen is flat. Bowel sounds are normal. There is no distension.      Palpations: Abdomen is soft.      Tenderness: There is no abdominal tenderness. There is no guarding or rebound.   Musculoskeletal:      Cervical back: Normal range of motion and neck supple.   Skin:     General: Skin is warm " and dry.   Neurological:      Mental Status: He is alert and oriented to person, place, and time.   Psychiatric:         Mood and Affect: Mood normal.         Behavior: Behavior normal.          Result Review :       Common labs    Common Labs 7/15/22 7/15/22 7/15/22 7/15/22 7/15/22 10/18/22 10/18/22    1525 1525 1525 1525 1525 1127 1127   Glucose  79     92   BUN  10     12   Creatinine  1.03     1.09   Sodium  136     136   Potassium  4.6     4.6   Chloride  102     100   Calcium  9.2     9.6   Albumin  4.20     4.60   Total Bilirubin  0.3     0.5   Alkaline Phosphatase  67     67   AST (SGOT)  26     18   ALT (SGPT)  32     17   WBC 8.87     7.57    Hemoglobin 13.0     15.0    Hematocrit 38.2     43.2    Platelets 138 (A)   279  125 (A)    Total Cholesterol   170       Triglycerides   242 (A)       HDL Cholesterol   39 (A)       LDL Cholesterol    90       Hemoglobin A1C     5.40     (A) Abnormal value                                Assessment and Plan    Diagnoses and all orders for this visit:    1. Irritable bowel syndrome with diarrhea (Primary)      · Recommend a trial of complete dairy free.   · Continue IBGuard as this seems to help.  · Continue 20mg pantoprazole.       Follow Up   Return in about 3 months (around 3/9/2023).    Dragon dictation used throughout this note.     RACHEAL Crawford

## 2023-01-09 ENCOUNTER — OFFICE VISIT (OUTPATIENT)
Dept: FAMILY MEDICINE CLINIC | Facility: CLINIC | Age: 48
End: 2023-01-09
Payer: COMMERCIAL

## 2023-01-09 VITALS
TEMPERATURE: 97.5 F | HEART RATE: 76 BPM | RESPIRATION RATE: 16 BRPM | WEIGHT: 175 LBS | HEIGHT: 68 IN | DIASTOLIC BLOOD PRESSURE: 60 MMHG | OXYGEN SATURATION: 98 % | BODY MASS INDEX: 26.52 KG/M2 | SYSTOLIC BLOOD PRESSURE: 110 MMHG

## 2023-01-09 DIAGNOSIS — F51.01 IDIOPATHIC INSOMNIA: Primary | ICD-10-CM

## 2023-01-09 DIAGNOSIS — F51.01 PRIMARY INSOMNIA: ICD-10-CM

## 2023-01-09 DIAGNOSIS — G43.109 MIGRAINE WITH AURA AND WITHOUT STATUS MIGRAINOSUS, NOT INTRACTABLE: ICD-10-CM

## 2023-01-09 DIAGNOSIS — G47.33 OSA (OBSTRUCTIVE SLEEP APNEA): ICD-10-CM

## 2023-01-09 PROBLEM — D69.1 PLATELET DYSFUNCTION: Status: ACTIVE | Noted: 2023-01-09

## 2023-01-09 PROCEDURE — 99213 OFFICE O/P EST LOW 20 MIN: CPT | Performed by: PHYSICIAN ASSISTANT

## 2023-01-09 RX ORDER — ZOLPIDEM TARTRATE 10 MG/1
TABLET ORAL
Qty: 30 TABLET | Refills: 2 | Status: SHIPPED | OUTPATIENT
Start: 2023-01-09 | End: 2023-04-04

## 2023-01-09 NOTE — PROGRESS NOTES
Subjective   Colby Duenas is a 47 y.o. male.     History of Present Illness    Since the last visit, he has overall felt well.  He has Migraine headaches and well controlled on suppression medication.  he has been compliant with current medications have reviewed them.  The patient denies medication side effects.  Will refill medications. /60 (BP Location: Left arm, Patient Position: Sitting, Cuff Size: Adult)   Pulse 76   Temp 97.5 °F (36.4 °C) (Oral)   Resp 16   Ht 172.7 cm (67.99\")   Wt 79.4 kg (175 lb)   SpO2 98%   BMI 26.61 kg/m²    Colby Duenas 47 y.o. male presents for follow up of insomnia with onset of symptoms years ago. Patient describes symptoms as early morning awakening, frequent night time awakening, difficulty falling asleep and non-restful sleep. Patient has found no relief with Trazodone, OTC Benadryl, Tylenol PM OTC, Advil PM OTC, Melatonin, avoiding exercise prior to bedtime, going to bed at the same time every night and getting up at the same time and avoiding naps. Associated symptoms include: fatigue if unable to take Rx . Patient denies history of addiction Symptoms have been well-controlled with taking current prescription medication.  The patient has failed multiple OTC medications for insomnia.  They are well controlled on current Rx and will continue to try to take Rx PRN.  He will use the lowest effective dose.  The patient has read and signed the [facility] Controlled Substance Contract.  I will continue to see patient for regular follow up appointments and be prescribed the lowest effective dose.  NELY has been reviewed by me and is updated every 3 months. The patient is aware of the potential for addiction and dependence.  He denies that Ambien (Zolpidem) causes excessive daytime drowsiness and sleep walking.  Patient voices understanding to take Ambien (Zolpidem) and go straight to bed. Patient must be able to sleep 7 hours or more when taking this and  no alcohol.  Patient will hold Rx and contact me if they experience any impaired mental alertness the next day.        Noted on 7/13/2022 visit that migraine meds were helping to control  headaches.  On the Aimovig he was only having 3-4 migraines a month.  Also noted last cholesterol score was 2.15%  Has been to Ortho in the past for right shoulder.  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  Exercise with job. Weight stable.    Results for orders placed or performed in visit on 10/18/22   Comprehensive Metabolic Panel    Specimen: Blood   Result Value Ref Range    Glucose 92 65 - 99 mg/dL    BUN 12 6 - 20 mg/dL    Creatinine 1.09 0.76 - 1.27 mg/dL    Sodium 136 136 - 145 mmol/L    Potassium 4.6 3.5 - 5.2 mmol/L    Chloride 100 98 - 107 mmol/L    CO2 27.2 22.0 - 29.0 mmol/L    Calcium 9.6 8.6 - 10.5 mg/dL    Total Protein 7.2 6.0 - 8.5 g/dL    Albumin 4.60 3.50 - 5.20 g/dL    ALT (SGPT) 17 1 - 41 U/L    AST (SGOT) 18 1 - 40 U/L    Alkaline Phosphatase 67 39 - 117 U/L    Total Bilirubin 0.5 0.0 - 1.2 mg/dL    Globulin 2.6 gm/dL    A/G Ratio 1.8 g/dL    BUN/Creatinine Ratio 11.0 7.0 - 25.0    Anion Gap 8.8 5.0 - 15.0 mmol/L    eGFR 83.7 >60.0 mL/min/1.73   CBC Auto Differential    Specimen: Blood   Result Value Ref Range    WBC 7.57 3.40 - 10.80 10*3/mm3    RBC 4.92 4.14 - 5.80 10*6/mm3    Hemoglobin 15.0 13.0 - 17.7 g/dL    Hematocrit 43.2 37.5 - 51.0 %    MCV 87.8 79.0 - 97.0 fL    MCH 30.5 26.6 - 33.0 pg    MCHC 34.7 31.5 - 35.7 g/dL    RDW 12.9 12.3 - 15.4 %    RDW-SD 40.6 37.0 - 54.0 fl    MPV 12.6 (H) 6.0 - 12.0 fL    Platelets 125 (L) 140 - 450 10*3/mm3    Neutrophil % 52.0 42.7 - 76.0 %    Lymphocyte % 34.3 19.6 - 45.3 %    Monocyte % 9.2 5.0 - 12.0 %    Eosinophil % 3.0 0.3 - 6.2 %    Basophil % 1.1 0.0 - 1.5 %    Immature Grans % 0.4 0.0 - 0.5 %    Neutrophils, Absolute 3.93 1.70 - 7.00 10*3/mm3    Lymphocytes,  Absolute 2.60 0.70 - 3.10 10*3/mm3    Monocytes, Absolute 0.70 0.10 - 0.90 10*3/mm3    Eosinophils, Absolute 0.23 0.00 - 0.40 10*3/mm3    Basophils, Absolute 0.08 0.00 - 0.20 10*3/mm3    Immature Grans, Absolute 0.03 0.00 - 0.05 10*3/mm3    nRBC 0.0 0.0 - 0.2 /100 WBC   CT abdomen and pelvis was performed with contrast on 10/28/2022 and was a normal scan without mass.  Saw GI on 12/9/2022 diagnosis of irritable bowel syndrome with diarrhea and recommended trial of being dairy free and IBgard and to continue pantoprazole 20 mg daily.  Use Cpap/Bipap every night---sees DR Jose A Germain cardio hx PSVT  Prior notes: ---2016  On beta blocker for hx sinus tachycardia; hx syncope  Loop recorder----released from cardio-----DR Moerno   Echo stress test 12-23-16  Conclusions:   Global left ventricular wall motion and contractility are within normal   limits.   The EF 4ch was calculated at 58%.   Normal left ventricular diastolic filling is observed.   Definity was used to optimize study.   Normal Sress Echo   Normal Stress EKG    Has been to neurology in the past and saw Dr. Cordova for migraines and hx concussions.  Last visit he reported that he was not taking his prevention medicine due to insurance.  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    The following portions of the patient's history were reviewed and updated as appropriate: allergies, current medications, past family history, past medical history, past social history, past surgical history and problem list.    Review of Systems   Constitutional: Negative for activity change, appetite change and unexpected weight change.   HENT: Negative for nosebleeds and trouble swallowing.    Eyes: Negative for pain and visual disturbance.    Respiratory: Negative for chest tightness, shortness of breath and wheezing.    Cardiovascular: Negative for chest pain and palpitations.   Gastrointestinal: Positive for abdominal pain. Negative for blood in stool.   Endocrine: Negative.    Genitourinary: Negative for difficulty urinating and hematuria.   Musculoskeletal: Negative for joint swelling.   Skin: Negative for color change and rash.   Allergic/Immunologic: Negative.    Neurological: Negative for syncope and speech difficulty.   Hematological: Negative for adenopathy.   Psychiatric/Behavioral: Positive for sleep disturbance. Negative for agitation and confusion.   All other systems reviewed and are negative.      Objective   Physical Exam  Vitals and nursing note reviewed.   Constitutional:       General: He is not in acute distress.     Appearance: Normal appearance. He is well-developed. He is not diaphoretic.   HENT:      Head: Normocephalic.      Right Ear: External ear normal.      Left Ear: External ear normal.   Eyes:      General: No scleral icterus.     Conjunctiva/sclera: Conjunctivae normal.      Pupils: Pupils are equal, round, and reactive to light.   Neck:      Vascular: No carotid bruit.   Cardiovascular:      Rate and Rhythm: Normal rate and regular rhythm.      Heart sounds: No murmur heard.  Musculoskeletal:         General: Normal range of motion.      Cervical back: Normal range of motion and neck supple.      Right lower leg: No edema.      Left lower leg: No edema.   Skin:     General: Skin is warm and dry.      Findings: No rash.   Neurological:      Mental Status: He is alert and oriented to person, place, and time.   Psychiatric:         Mood and Affect: Mood normal. Affect is not inappropriate.         Behavior: Behavior normal.         Thought Content: Thought content normal.         Judgment: Judgment normal.         Assessment & Plan   Diagnoses and all orders for this visit:    1. Idiopathic insomnia (Primary)    2. LEN  (obstructive sleep apnea)  Comments:  on Cpap    3. Migraine with aura and without status migrainosus, not intractable  Comments:  stable on meds      Continue Ambien working well----  Labs due in July  Continue current migraine regimen working well with the Aimovig for suppression and Relpax for acute migraine  I continue to refill his Toprol for treatment of sinus tachycardia and noted cardiology notes from 2016 above--this is stable and controlled       Answers for HPI/ROS submitted by the patient on 1/2/2023  What is the primary reason for your visit?: Other  Please describe your symptoms.: There are no refills left on my prescription.  Have you had these symptoms before?: Yes  How long have you been having these symptoms?: 5-7 days

## 2023-02-09 RX ORDER — MONTELUKAST SODIUM 4 MG/1
TABLET, CHEWABLE ORAL
Qty: 60 TABLET | Refills: 2 | Status: SHIPPED | OUTPATIENT
Start: 2023-02-09 | End: 2023-02-09

## 2023-03-09 ENCOUNTER — OFFICE VISIT (OUTPATIENT)
Dept: GASTROENTEROLOGY | Facility: CLINIC | Age: 48
End: 2023-03-09
Payer: COMMERCIAL

## 2023-03-09 VITALS
HEART RATE: 75 BPM | BODY MASS INDEX: 26.13 KG/M2 | HEIGHT: 68 IN | SYSTOLIC BLOOD PRESSURE: 114 MMHG | TEMPERATURE: 96.4 F | OXYGEN SATURATION: 97 % | WEIGHT: 172.4 LBS | DIASTOLIC BLOOD PRESSURE: 76 MMHG

## 2023-03-09 DIAGNOSIS — K58.0 IRRITABLE BOWEL SYNDROME WITH DIARRHEA: Primary | ICD-10-CM

## 2023-03-09 PROCEDURE — 99214 OFFICE O/P EST MOD 30 MIN: CPT | Performed by: PHYSICIAN ASSISTANT

## 2023-03-09 NOTE — PROGRESS NOTES
"Chief Complaint  Abdominal Pain    Subjective        History of Present Illness  Colby Duenas is a  48 y.o. male here for follow-up for IBS-D, weight loss and abdominal pain.  He is a patient of Dr. Parker was last seen by me in the clinic on 12/9/2022.    He is seen a significant improvement in his diarrhea with occurrences maybe once every 2 to 3 weeks however he continues to complain of constant pressure in his abdomen described as \"gas pains.  Gas-X and Pepto provided no relief.  He reports his bowels are moving daily but he may not completely emptying.  He states he does not feel constipated per se.  He has been avoiding dairy for the last several months as he has found this to be a trigger.  He is on methylcellulose tablets twice a day.  His weight does continue to decline.    Weight:  3/9/2023 78.2 kg 172 lb 6.4 oz   1/9/2023 79.379 kg 175 lb   12/9/2022 80.015 kg 176 lb 6.4 oz   10/24/2022 81.33 kg 179 lb 4.8 oz   10/5/2022 79.833 kg 176 lb   7/13/2022 83.144 kg 183 lb 4.     Last colonoscopy in March 2019 at [facility]: unremarkable small bowel and random colon biopsies.     CT abdomen and pelvis with contrast completed 10/28/2022 for weight loss and diarrhea was unremarkable.        Past Medical History:   Diagnosis Date   • Alcoholism ([facility]) 2012    Sober since 7/6/2018   • Allergic    • Anxiety    • Arthritis    • Colon polyp 2010   • Dislocation, shoulder 06/1996   • Fracture, clavicle 02/1986   • GERD (gastroesophageal reflux disease)    • Hernia 1992   • History of colon polyps    • Hypertension 2016   • IBS (irritable bowel syndrome)    • Knee swelling 10/1994   • Low back strain 07/1992   • Migraines    • Periarthritis of shoulder 04/2014   • PSVT (paroxysmal supraventricular tachycardia) ([facility])    • Seasonal allergies    • Shoulder pain    • Sleep apnea     CPAP   • Stress fracture 02/1995   • Tear of meniscus of knee 02/1995   • Tendinitis of knee 02/1995       Past Surgical History:   Procedure " Laterality Date   • CARDIAC CATHETERIZATION     • COLONOSCOPY     • HAND SURGERY  1990   • HERNIA REPAIR     • INGUINAL HERNIA REPAIR      X3   • SHOULDER ACROMIOCLAVICULAR JOINT REPAIR Right    • SHOULDER ARTHROSCOPY Right 2021    Procedure: SHOULDER ARTHROSCOPY WITH SUBACROMIAL DECOMPRESSION, DEBRIDEMENT  BURSAL SIDED ROTATOR CUFF;  Surgeon: Radha Caruso MD;  Location: [facility];  Service: Orthopedics;  Laterality: Right;   • SHOULDER SURGERY Right 2014   • TRIGGER POINT INJECTION  Multiple   • UPPER GASTROINTESTINAL ENDOSCOPY  2018       Family History   Problem Relation Age of Onset   • Hypertension Mother    • Arthritis Mother    • Irritable bowel syndrome Mother    • Hypertension Father    • Irritable bowel syndrome Maternal Grandfather    • Malig Hyperthermia Neg Hx        Social History     Socioeconomic History   • Marital status:    Tobacco Use   • Smoking status: Former     Packs/day: 2.00     Years: 15.00     Pack years: 30.00     Types: Cigarettes     Start date: 7/10/1990     Quit date: 2017     Years since quittin.7   • Smokeless tobacco: Former     Types: Chew     Quit date: 2014   • Tobacco comments:     Chewing tobacco   Vaping Use   • Vaping Use: Never used   Substance and Sexual Activity   • Alcohol use: Not Currently     Comment: Sober since 2018   • Drug use: Never   • Sexual activity: Yes     Partners: Female     Birth control/protection: None       Allergies   Allergen Reactions   • Oxycodone-Acetaminophen Hives       Current Outpatient Medications on File Prior to Visit   Medication Sig Dispense Refill   • eletriptan (RELPAX) 20 MG tablet Take 1 tablet by mouth 1 (One) Time As Needed for Migraine for up to 1 dose. may repeat in 2 hours if necessary 15 tablet 11   • Erenumab-aooe (Aimovig) 140 MG/ML prefilled syringe Inject 1 mL under the skin into the appropriate area as directed Every 30 (Thirty) Days. For migraines 3 pen 3   • FIBER COMPLETE  "tablet Take 1 tablet by mouth 2 (Two) Times a Day.     • fluticasone (FLONASE) 50 MCG/ACT nasal spray 1 spray into the nostril(s) as directed by provider Daily As Needed.     • hyoscyamine (ANASPAZ,LEVSIN) 0.125 MG tablet Take 1 tablet by mouth Every 6 (Six) Hours As Needed for Cramping. 60 tablet 5   • melatonin 3 MG tablet Take  by mouth Every Night. WITH CBD OIL/PT HOLDING FOR SURGERY     • metoprolol succinate XL (TOPROL-XL) 50 MG 24 hr tablet Take 1 tablet by mouth Every Morning. For heart rate 90 tablet 3   • montelukast (SINGULAIR) 10 MG tablet Take 1 tablet by mouth Every Night. 90 tablet 3   • ondansetron (Zofran) 4 MG tablet Take 1 tablet by mouth Every 8 (Eight) Hours As Needed for Nausea or Vomiting. 20 tablet 0   • pantoprazole (PROTONIX) 20 MG EC tablet TAKE ONE TABLET BY MOUTH DAILY FOR GERD 90 tablet 3   • Peppermint Oil (IBGARD PO) Take 1 capsule by mouth 2 (Two) Times a Day. PT HOLDING FOR SURGERY     • Probiotic Product (ALIGN PO) Take 1 capsule by mouth Every Morning. PT HOLDING FOR SURGERY     • zolpidem (AMBIEN) 10 MG tablet 1/2 -1 PO Q HS prn insomnia 30 tablet 2   • [DISCONTINUED] dicyclomine (BENTYL) 20 MG tablet Take 1 tablet by mouth 3 (Three) Times a Day As Needed (IBS). 90 tablet 11   • [DISCONTINUED] neomycin-polymyxin-hydrocortisone (CORTISPORIN) 3.5-29593-1 otic suspension Administer 3 drops into both ears 4 (Four) Times a Day. For 7 days 10 mL 11     No current facility-administered medications on file prior to visit.       Review of Systems     Objective   Vital Signs:   /76   Pulse 75   Temp 96.4 °F (35.8 °C)   Ht 172.7 cm (68\")   Wt 78.2 kg (172 lb 6.4 oz)   SpO2 97%   BMI 26.21 kg/m²       Physical Exam  Vitals and nursing note reviewed.   Constitutional:       General: He is not in acute distress.     Appearance: Normal appearance. He is not ill-appearing.   HENT:      Head: Normocephalic and atraumatic.      Right Ear: External ear normal.      Left Ear: External " ear normal.   Eyes:      General: No scleral icterus.     Conjunctiva/sclera: Conjunctivae normal.      Pupils: Pupils are equal, round, and reactive to light.   Pulmonary:      Effort: Pulmonary effort is normal.   Musculoskeletal:      Cervical back: Normal range of motion and neck supple.   Skin:     General: Skin is warm and dry.   Neurological:      Mental Status: He is alert and oriented to person, place, and time.   Psychiatric:         Mood and Affect: Mood normal.         Behavior: Behavior normal.          Result Review :       Common labs    Common Labs 7/15/22 7/15/22 7/15/22 7/15/22 7/15/22 10/18/22 10/18/22    1525 1525 1525 1525 1525 1127 1127   Glucose  79     92   BUN  10     12   Creatinine  1.03     1.09   Sodium  136     136   Potassium  4.6     4.6   Chloride  102     100   Calcium  9.2     9.6   Albumin  4.20     4.60   Total Bilirubin  0.3     0.5   Alkaline Phosphatase  67     67   AST (SGOT)  26     18   ALT (SGPT)  32     17   WBC 8.87     7.57    Hemoglobin 13.0     15.0    Hematocrit 38.2     43.2    Platelets 138 (A)   279  125 (A)    Total Cholesterol   170       Triglycerides   242 (A)       HDL Cholesterol   39 (A)       LDL Cholesterol    90       Hemoglobin A1C     5.40     (A) Abnormal value                                Assessment and Plan    Diagnoses and all orders for this visit:    1. Irritable bowel syndrome with diarrhea (Primary)  -     riFAXIMin (XIFAXAN) 550 MG tablet; Take 1 tablet by mouth Every 8 (Eight) Hours.  Dispense: 42 tablet; Refill: 0      · He has had good results with Xifaxan, we will proceed with another round.  · Recommend holding Levsin if at all possible.   · Keep a food diary to ensure adequate caloric intake.  We also discussed supplementation with protein shakes in between meals.  · Follow-up in 3 months, sooner if necessary.        Follow Up   Return in about 3 months (around 6/9/2023) for Dr. Parker, Luana Saldana PA-C.    Pamela dictation used  throughout this note.     RACHEAL Crawford

## 2023-04-04 DIAGNOSIS — F51.01 PRIMARY INSOMNIA: ICD-10-CM

## 2023-04-04 RX ORDER — ZOLPIDEM TARTRATE 10 MG/1
TABLET ORAL
Qty: 30 TABLET | Refills: 1 | Status: SHIPPED | OUTPATIENT
Start: 2023-04-04

## 2023-05-10 ENCOUNTER — TELEPHONE (OUTPATIENT)
Dept: FAMILY MEDICINE CLINIC | Facility: CLINIC | Age: 48
End: 2023-05-10
Payer: COMMERCIAL

## 2023-05-10 NOTE — TELEPHONE ENCOUNTER
Pharmacy Name:  [id number] [place] - [address] [address] [address] AT [address] - [phone]  - [phone]     Pharmacy representative name: KATHLEEN    Pharmacy representative phone number: [phone]    What medication are you calling in regards to: EPINEPHRINE AUTO INJECTOR    What question does the pharmacy have: KATHLEEN WANTED TO VERIFY THE PRODUCT. KATHLEEN STATED THAT IT WAS FILLED ORIGINALLY IN AUGUST. THEY LOOKED AT IT AND THEY DID IT FOR A REGULAR EPI-PEN PRODUCT. KATHLEEN STATED THAT ON THE PRESCRIPTION IN PARENTHESES, IT SHOWS AUVI-Q. KATHLEEN IS ASKING IF IT IS OK FOR PATIENT TO STAY ON THE REGULAR EPINEPHRIN AUTO INJECTOR OR IF THEY WANTED THE AUVI-Q, WHICH IS NOT TYPICALLY COVERD BY INSURANCE. PLEASE ADVISE.

## 2023-06-02 ENCOUNTER — OFFICE VISIT (OUTPATIENT)
Dept: FAMILY MEDICINE CLINIC | Facility: CLINIC | Age: 48
End: 2023-06-02

## 2023-06-02 VITALS
WEIGHT: 169 LBS | HEIGHT: 68 IN | OXYGEN SATURATION: 97 % | DIASTOLIC BLOOD PRESSURE: 60 MMHG | SYSTOLIC BLOOD PRESSURE: 108 MMHG | HEART RATE: 65 BPM | TEMPERATURE: 95.6 F | RESPIRATION RATE: 16 BRPM | BODY MASS INDEX: 25.61 KG/M2

## 2023-06-02 DIAGNOSIS — K58.0 IRRITABLE BOWEL SYNDROME WITH DIARRHEA: ICD-10-CM

## 2023-06-02 DIAGNOSIS — I47.1 PSVT (PAROXYSMAL SUPRAVENTRICULAR TACHYCARDIA): ICD-10-CM

## 2023-06-02 DIAGNOSIS — K21.9 GASTROESOPHAGEAL REFLUX DISEASE WITHOUT ESOPHAGITIS: ICD-10-CM

## 2023-06-02 DIAGNOSIS — F51.01 IDIOPATHIC INSOMNIA: ICD-10-CM

## 2023-06-02 DIAGNOSIS — J30.9 CHRONIC ALLERGIC RHINITIS: ICD-10-CM

## 2023-06-02 DIAGNOSIS — E78.2 HYPERLIPIDEMIA, MIXED: ICD-10-CM

## 2023-06-02 DIAGNOSIS — G43.109 MIGRAINE WITH AURA AND WITHOUT STATUS MIGRAINOSUS, NOT INTRACTABLE: Primary | ICD-10-CM

## 2023-06-02 DIAGNOSIS — R73.01 IMPAIRED FASTING GLUCOSE: ICD-10-CM

## 2023-06-02 DIAGNOSIS — Z99.89 OSA ON CPAP: ICD-10-CM

## 2023-06-02 DIAGNOSIS — E55.9 VITAMIN D DEFICIENCY: ICD-10-CM

## 2023-06-02 DIAGNOSIS — G47.33 OSA ON CPAP: ICD-10-CM

## 2023-06-02 DIAGNOSIS — D69.1 PLATELET DYSFUNCTION: ICD-10-CM

## 2023-06-02 PROCEDURE — 99214 OFFICE O/P EST MOD 30 MIN: CPT | Performed by: PHYSICIAN ASSISTANT

## 2023-06-02 RX ORDER — METOPROLOL SUCCINATE 50 MG/1
50 TABLET, EXTENDED RELEASE ORAL EVERY MORNING
Qty: 90 TABLET | Refills: 3 | Status: SHIPPED | OUTPATIENT
Start: 2023-06-02

## 2023-06-02 RX ORDER — ZOLPIDEM TARTRATE 12.5 MG/1
12.5 TABLET, FILM COATED, EXTENDED RELEASE ORAL NIGHTLY PRN
Qty: 30 TABLET | Refills: 2 | Status: SHIPPED | OUTPATIENT
Start: 2023-06-02

## 2023-06-02 RX ORDER — ELETRIPTAN HYDROBROMIDE 20 MG/1
20 TABLET, FILM COATED ORAL ONCE AS NEEDED
Qty: 15 TABLET | Refills: 11 | Status: SHIPPED | OUTPATIENT
Start: 2023-06-02

## 2023-06-02 RX ORDER — PANTOPRAZOLE SODIUM 20 MG/1
20 TABLET, DELAYED RELEASE ORAL DAILY
Qty: 90 TABLET | Refills: 3 | Status: SHIPPED | OUTPATIENT
Start: 2023-06-02

## 2023-06-02 RX ORDER — ERENUMAB-AOOE 140 MG/ML
1 INJECTION, SOLUTION SUBCUTANEOUS
Qty: 1 ML | Refills: 12 | Status: SHIPPED | OUTPATIENT
Start: 2023-06-02

## 2023-06-02 NOTE — PROGRESS NOTES
Subjective   Colby Duenas is a 48 y.o. male.     History of Present Illness    Since the last visit, he has overall felt fairly well.  He has Impaired fasting glucose and will monitor labs to watch for DMII, GERD controlled on PPI Rx, Hyperlipidemia and working on this with diet and exercise, Seasonal allergies and doing well on their medication , Vitamin D deficiency and will update labs for continued management, Migraine headaches and responding well to PRN triptan or CGPR inhibitor and Migraine headaches and well controlled on suppression medication.  he has been compliant with current medications have reviewed them.  The patient denies medication side effects.  Will refill medications. There were no vitals taken for this visit.    Results for orders placed or performed in visit on 10/18/22   Comprehensive Metabolic Panel    Specimen: Blood   Result Value Ref Range    Glucose 92 65 - 99 mg/dL    BUN 12 6 - 20 mg/dL    Creatinine 1.09 0.76 - 1.27 mg/dL    Sodium 136 136 - 145 mmol/L    Potassium 4.6 3.5 - 5.2 mmol/L    Chloride 100 98 - 107 mmol/L    CO2 27.2 22.0 - 29.0 mmol/L    Calcium 9.6 8.6 - 10.5 mg/dL    Total Protein 7.2 6.0 - 8.5 g/dL    Albumin 4.60 3.50 - 5.20 g/dL    ALT (SGPT) 17 1 - 41 U/L    AST (SGOT) 18 1 - 40 U/L    Alkaline Phosphatase 67 39 - 117 U/L    Total Bilirubin 0.5 0.0 - 1.2 mg/dL    Globulin 2.6 gm/dL    A/G Ratio 1.8 g/dL    BUN/Creatinine Ratio 11.0 7.0 - 25.0    Anion Gap 8.8 5.0 - 15.0 mmol/L    eGFR 83.7 >60.0 mL/min/1.73   CBC Auto Differential    Specimen: Blood   Result Value Ref Range    WBC 7.57 3.40 - 10.80 10*3/mm3    RBC 4.92 4.14 - 5.80 10*6/mm3    Hemoglobin 15.0 13.0 - 17.7 g/dL    Hematocrit 43.2 37.5 - 51.0 %    MCV 87.8 79.0 - 97.0 fL    MCH 30.5 26.6 - 33.0 pg    MCHC 34.7 31.5 - 35.7 g/dL    RDW 12.9 12.3 - 15.4 %    RDW-SD 40.6 37.0 - 54.0 fl    MPV 12.6 (H) 6.0 - 12.0 fL    Platelets 125 (L) 140 - 450 10*3/mm3    Neutrophil % 52.0 42.7 - 76.0 %     Lymphocyte % 34.3 19.6 - 45.3 %    Monocyte % 9.2 5.0 - 12.0 %    Eosinophil % 3.0 0.3 - 6.2 %    Basophil % 1.1 0.0 - 1.5 %    Immature Grans % 0.4 0.0 - 0.5 %    Neutrophils, Absolute 3.93 1.70 - 7.00 10*3/mm3    Lymphocytes, Absolute 2.60 0.70 - 3.10 10*3/mm3    Monocytes, Absolute 0.70 0.10 - 0.90 10*3/mm3    Eosinophils, Absolute 0.23 0.00 - 0.40 10*3/mm3    Basophils, Absolute 0.08 0.00 - 0.20 10*3/mm3    Immature Grans, Absolute 0.03 0.00 - 0.05 10*3/mm3    nRBC 0.0 0.0 - 0.2 /100 WBC       Stopped losing weight; still low weight    Colby Duenas 48 y.o. male presents for follow up of insomnia with onset of symptoms years ago. Patient describes symptoms as early morning awakening, frequent night time awakening and difficulty falling asleep. Patient has found no relief with Trazodone, OTC Benadryl, Tylenol PM OTC, Melatonin, no medication, avoiding exercise prior to bedtime, going to bed at the same time every night and getting up at the same time and avoiding naps. Associated symptoms include: fatigue if unable to take Rx . Patient denies history of addiction Symptoms have not responded to prescribed medication and wants to try different RX and may only sleep few hours--broken sleep; ok try CR Ambien.  failed Lunesta in past.  The patient has failed multiple OTC medications for insomnia.  They are well controlled on current Rx and will continue to try to take Rx PRN.  He will use the lowest effective dose.  The patient has read and signed the [facility] Controlled Substance Contract.  I will continue to see patient for regular follow up appointments and be prescribed the lowest effective dose.  NELY has been reviewed by me and is updated every 3 months. The patient is aware of the potential for addiction and dependence.  He denies that Ambien (Zolpidem) causes excessive daytime drowsiness and sleep walking.  Patient voices understanding to take Ambien (Zolpidem) and go straight to bed. Patient must  be able to sleep 7 hours or more when taking this and no alcohol.  Patient will hold Rx and contact me if they experience any impaired mental alertness the next day.    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  Was seen at urgent care 3/16/2023 upper respiratory infection treated with Keflex and Promethazine DM--resolved  Had follow-up with GI for irritable bowel with diarrhea diagnosis on 3/9/2023.  Treatment with Xifaxan and follow-up due 3-month--- has appointment 6/13/2023  Use Cpap/Bipap every night---sees DR Naranjo  Sees cardio hx PSVT  Prior notes: ---2016  On beta blocker for hx sinus tachycardia; hx syncope  Loop recorder----released from cardio-----DR Moreno   Echo stress test 12-23-16  Conclusions:   Global left ventricular wall motion and contractility are within normal   limits.   The EF 4ch was calculated at 58%.   Normal left ventricular diastolic filling is observed.   Definity was used to optimize study.   Normal Sress Echo   Normal Stress EKG    Has been to neurology in the past and saw Dr. Cordova for migraines and hx concussions.  Last visit he reported that he was not taking his prevention medicine due to insurance.  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    Review of Systems    Objective   Physical Exam    Assessment & Plan   Diagnoses and all orders for this visit:    1. Migraine with aura and without status migrainosus, not intractable (Primary)    2.  LEN on CPAP    3. Irritable bowel syndrome with diarrhea    4. Hyperlipidemia, mixed    5. Idiopathic insomnia    6. PSVT (paroxysmal supraventricular tachycardia)    7. Gastroesophageal reflux disease without esophagitis    8. Chronic allergic rhinitis    Plan, Colby Duenas, was seen today.  he was seen for Imparied fasting glucose and plan follow up labs, diet, and exercise, Hyperlipidemia and decline my recommendation of medication for treatment, Seasonal allergies and is doing well on their medication PRN, Vitamin D deficiency and will update labs , Migraine headaches and will continue with their PRN triptan and Migraine headaches and well controlled on their suppressive medication.    Followed also by GI for GERD and IBS remains on PPI with follow-up appointment due on 6/13/2023  Will send Evan HAUSER to see if that works better for the fragmented sleep and have him talk to sleep medicine about this  Updating labs  Continue CPAP for obstructive sleep apnea  Continue metoprolol for treatment of heart rate in control from prior PSVT, working well             Answers for HPI/ROS submitted by the patient on 5/31/2023  What is the primary reason for your visit?: Other  Please describe your symptoms.: Refill for sleep medicine.  Still having insomnia issues.  Have you had these symptoms before?: Yes  How long have you been having these symptoms?: Greater than 2 weeks  Please list any medications you are currently taking for this condition.: Ambien

## 2023-06-13 ENCOUNTER — OFFICE VISIT (OUTPATIENT)
Dept: GASTROENTEROLOGY | Facility: CLINIC | Age: 48
End: 2023-06-13
Payer: COMMERCIAL

## 2023-06-13 VITALS
DIASTOLIC BLOOD PRESSURE: 78 MMHG | SYSTOLIC BLOOD PRESSURE: 120 MMHG | OXYGEN SATURATION: 98 % | HEART RATE: 63 BPM | HEIGHT: 68 IN | BODY MASS INDEX: 25.91 KG/M2 | TEMPERATURE: 97.7 F | WEIGHT: 171 LBS

## 2023-06-13 DIAGNOSIS — K58.0 IRRITABLE BOWEL SYNDROME WITH DIARRHEA: Primary | ICD-10-CM

## 2023-06-13 DIAGNOSIS — R63.4 WEIGHT LOSS, ABNORMAL: ICD-10-CM

## 2023-06-13 NOTE — PROGRESS NOTES
"Chief Complaint  Irritable Bowel Syndrome with diarhea    Subjective          History of Present Illness    Colby Duenas is a  48 y.o. male presents for follow-up on IBS-D and weight loss.  He is a patient of Dr. Parker last seen by Nancie Dasilva PA-C on 3/9/2023.  He is new to me.    History of IBS-D treated with 2 courses of Xifaxan this year.  He is also taking methylcellulose tablets twice daily.  Xifaxan has really helped symptoms.  Diarrhea and abdominal Pain are doing fairly well as long as he avoids flare foods.     He also had some weight loss-about 11 pounds over 8 months per our records.  He reports about 20 pounds over the last year.  He has been stable over the last 3 months.  His PCP ordered some labs to be drawn and these will be done this week. He is not gaining despite increasing caloric intake. He relates the weight loss to his GI symptoms and poor appetite.  He does state his appetite is overall improving but is still hit or miss.    Failed Gas-X and Pepto.  He avoids dairy as this can be a trigger for his diarrhea.    10/28/2022 contrasted CT scan for weight loss and diarrhea was unremarkable.    3/2019 bidirectional endoscopy at [place] showed unremarkable upper and lower.  Unremarkable duodenal and colon biopsies.      Objective   Vital Signs:   /78   Pulse 63   Temp 97.7 °F (36.5 °C)   Ht 172.7 cm (68\")   Wt 77.6 kg (171 lb)   SpO2 98%   BMI 26.00 kg/m²       Physical Exam  Vitals reviewed.   Constitutional:       General: He is awake. He is not in acute distress.     Appearance: Normal appearance. He is well-developed and well-groomed.   HENT:      Head: Normocephalic.   Pulmonary:      Effort: Pulmonary effort is normal. No respiratory distress.   Skin:     Coloration: Skin is not pale.   Neurological:      Mental Status: He is alert and oriented to person, place, and time.      Gait: Gait is intact.   Psychiatric:         Mood and Affect: Mood and affect normal.   "       Speech: Speech normal.         Behavior: Behavior is cooperative.         Judgment: Judgment normal.        Result Review :             Assessment and Plan    Diagnoses and all orders for this visit:    1. Irritable bowel syndrome with diarrhea (Primary)    2. Weight loss, abnormal    Other orders  -     riFAXIMin (Xifaxan) 550 MG tablet; Take 1 tablet by mouth Every 8 (Eight) Hours for 14 days.  Dispense: 42 tablet; Refill: 2    Overall he is doing better from a diarrhea and abdominal cramping standpoint.  Appetite is hit or miss but slowly improving.  Weight has stabilized but he is having trouble gaining.  Recommend he continue to watch weight, if he loses any more, will need consider EGD and CLS. OK restart xifaxan if another flare.  Proceed with labs as scheduled with PCP as soon as possible.    Follow Up   Return if symptoms worsen or fail to improve.    Dragon dictation used throughout this note.     Luana Saldana PA-C

## 2023-06-14 ENCOUNTER — TELEPHONE (OUTPATIENT)
Dept: GASTROENTEROLOGY | Facility: CLINIC | Age: 48
End: 2023-06-14
Payer: COMMERCIAL

## 2023-06-15 ENCOUNTER — SPECIALTY PHARMACY (OUTPATIENT)
Dept: GASTROENTEROLOGY | Facility: CLINIC | Age: 48
End: 2023-06-15
Payer: COMMERCIAL

## 2023-06-15 NOTE — PROGRESS NOTES
Specialty Pharmacy     Faxed PA for xifaxan to [facility]     Leti Stephens  Specialty Pharmacy Technician

## 2023-09-04 DIAGNOSIS — F51.01 IDIOPATHIC INSOMNIA: ICD-10-CM

## 2023-09-05 RX ORDER — ZOLPIDEM TARTRATE 12.5 MG/1
12.5 TABLET, FILM COATED, EXTENDED RELEASE ORAL NIGHTLY PRN
Qty: 30 TABLET | Refills: 0 | Status: SHIPPED | OUTPATIENT
Start: 2023-09-05

## 2023-09-12 ENCOUNTER — OFFICE VISIT (OUTPATIENT)
Dept: FAMILY MEDICINE CLINIC | Facility: CLINIC | Age: 48
End: 2023-09-12
Payer: COMMERCIAL

## 2023-09-12 VITALS
TEMPERATURE: 97.5 F | BODY MASS INDEX: 25.46 KG/M2 | HEIGHT: 68 IN | RESPIRATION RATE: 16 BRPM | DIASTOLIC BLOOD PRESSURE: 58 MMHG | WEIGHT: 168 LBS | OXYGEN SATURATION: 94 % | SYSTOLIC BLOOD PRESSURE: 98 MMHG | HEART RATE: 67 BPM

## 2023-09-12 DIAGNOSIS — G47.33 OSA ON CPAP: ICD-10-CM

## 2023-09-12 DIAGNOSIS — G43.109 MIGRAINE WITH AURA AND WITHOUT STATUS MIGRAINOSUS, NOT INTRACTABLE: ICD-10-CM

## 2023-09-12 DIAGNOSIS — Z99.89 OSA ON CPAP: ICD-10-CM

## 2023-09-12 DIAGNOSIS — R73.01 IMPAIRED FASTING GLUCOSE: ICD-10-CM

## 2023-09-12 DIAGNOSIS — F51.01 IDIOPATHIC INSOMNIA: Primary | ICD-10-CM

## 2023-09-12 DIAGNOSIS — E55.9 VITAMIN D DEFICIENCY: ICD-10-CM

## 2023-09-12 DIAGNOSIS — E78.2 HYPERLIPIDEMIA, MIXED: ICD-10-CM

## 2023-09-12 DIAGNOSIS — I47.1 PSVT (PAROXYSMAL SUPRAVENTRICULAR TACHYCARDIA): ICD-10-CM

## 2023-09-12 DIAGNOSIS — K21.9 GASTROESOPHAGEAL REFLUX DISEASE WITHOUT ESOPHAGITIS: ICD-10-CM

## 2023-09-12 DIAGNOSIS — F51.01 PRIMARY INSOMNIA: ICD-10-CM

## 2023-09-12 PROCEDURE — 99214 OFFICE O/P EST MOD 30 MIN: CPT | Performed by: PHYSICIAN ASSISTANT

## 2023-09-12 RX ORDER — ZOLPIDEM TARTRATE 12.5 MG/1
12.5 TABLET, FILM COATED, EXTENDED RELEASE ORAL NIGHTLY PRN
Qty: 30 TABLET | Refills: 2 | Status: SHIPPED | OUTPATIENT
Start: 2023-09-12

## 2023-09-12 NOTE — PATIENT INSTRUCTIONS
Insomnia  Insomnia is a sleep disorder that makes it difficult to fall asleep or stay asleep. Insomnia can cause fatigue, low energy, difficulty concentrating, mood swings, and poor performance at work or school.  There are three different ways to classify insomnia:  Difficulty falling asleep.  Difficulty staying asleep.  Waking up too early in the morning.  Any type of insomnia can be long-term (chronic) or short-term (acute). Both are common. Short-term insomnia usually lasts for 3 months or less. Chronic insomnia occurs at least three times a week for longer than 3 months.  What are the causes?  Insomnia may be caused by another condition, situation, or substance, such as:  Having certain mental health conditions, such as anxiety and depression.  Using caffeine, alcohol, tobacco, or drugs.  Having gastrointestinal conditions, such as gastroesophageal reflux disease (GERD).  Having certain medical conditions. These include:  Asthma.  Alzheimer's disease.  Stroke.  Chronic pain.  An overactive thyroid gland (hyperthyroidism).  Other sleep disorders, such as restless legs syndrome and sleep apnea.  Menopause.  Sometimes, the cause of insomnia may not be known.  What increases the risk?  Risk factors for insomnia include:  Gender. Females are affected more often than males.  Age. Insomnia is more common as people get older.  Stress and certain medical and mental health conditions.  Lack of exercise.  Having an irregular work schedule. This may include working night shifts and traveling between different time zones.  What are the signs or symptoms?  If you have insomnia, the main symptom is having trouble falling asleep or having trouble staying asleep. This may lead to other symptoms, such as:  Feeling tired or having low energy.  Feeling nervous about going to sleep.  Not feeling rested in the morning.  Having trouble concentrating.  Feeling irritable, anxious, or depressed.  How is this diagnosed?  This condition  may be diagnosed based on:  Your symptoms and medical history. Your health care provider may ask about:  Your sleep habits.  Any medical conditions you have.  Your mental health.  A physical exam.  How is this treated?  Treatment for insomnia depends on the cause. Treatment may focus on treating an underlying condition that is causing the insomnia. Treatment may also include:  Medicines to help you sleep.  Counseling or therapy.  Lifestyle adjustments to help you sleep better.  Follow these instructions at home:  Eating and drinking    Limit or avoid alcohol, caffeinated beverages, and products that contain nicotine and tobacco, especially close to bedtime. These can disrupt your sleep.  Do not eat a large meal or eat spicy foods right before bedtime. This can lead to digestive discomfort that can make it hard for you to sleep.  Sleep habits    Keep a sleep diary to help you and your health care provider figure out what could be causing your insomnia. Write down:  When you sleep.  When you wake up during the night.  How well you sleep and how rested you feel the next day.  Any side effects of medicines you are taking.  What you eat and drink.  Make your bedroom a dark, comfortable place where it is easy to fall asleep.  Put up shades or blackout curtains to block light from outside.  Use a white noise machine to block noise.  Keep the temperature cool.  Limit screen use before bedtime. This includes:  Not watching TV.  Not using your smartphone, tablet, or computer.  Stick to a routine that includes going to bed and waking up at the same times every day and night. This can help you fall asleep faster. Consider making a quiet activity, such as reading, part of your nighttime routine.  Try to avoid taking naps during the day so that you sleep better at night.  Get out of bed if you are still awake after 15 minutes of trying to sleep. Keep the lights down, but try reading or doing a quiet activity. When you feel  sleepy, go back to bed.  General instructions  Take over-the-counter and prescription medicines only as told by your health care provider.  Exercise regularly as told by your health care provider. However, avoid exercising in the hours right before bedtime.  Use relaxation techniques to manage stress. Ask your health care provider to suggest some techniques that may work well for you. These may include:  Breathing exercises.  Routines to release muscle tension.  Visualizing peaceful scenes.  Make sure that you drive carefully. Do not drive if you feel very sleepy.  Keep all follow-up visits. This is important.  Contact a health care provider if:  You are tired throughout the day.  You have trouble in your daily routine due to sleepiness.  You continue to have sleep problems, or your sleep problems get worse.  Get help right away if:  You have thoughts about hurting yourself or someone else.  Get help right away if you feel like you may hurt yourself or others, or have thoughts about taking your own life. Go to your nearest emergency room or:  Call 911.  Call the National Suicide Prevention Lifeline at 1-486.735.2753 or 384. This is open 24 hours a day.  Text the Crisis Text Line at 998596.  Summary  Insomnia is a sleep disorder that makes it difficult to fall asleep or stay asleep.  Insomnia can be long-term (chronic) or short-term (acute).  Treatment for insomnia depends on the cause. Treatment may focus on treating an underlying condition that is causing the insomnia.  Keep a sleep diary to help you and your health care provider figure out what could be causing your insomnia.  This information is not intended to replace advice given to you by your health care provider. Make sure you discuss any questions you have with your health care provider.  Document Revised: 11/28/2022 Document Reviewed: 11/28/2022  [employer]vier Patient Education © 2023 [employer]

## 2023-09-12 NOTE — PROGRESS NOTES
"Subjective   Colby Duenas is a 48 y.o. male.     Hyperlipidemia    Heartburn  He reports no choking. Associated symptoms include fatigue.   Insomnia  Associated symptoms include fatigue. Pertinent negatives include no diaphoresis.     Since the last visit, he has overall felt tired.  He has Impaired fasting glucose and will monitor labs to watch for DMII, GERD controlled on PPI Rx, Hyperlipidemia and working on this with diet and exercise, Seasonal allergies and doing well on their medication , Vitamin D deficiency and will update labs for continued management, Migraine headaches and responding well to PRN triptan or CGPR inhibitor, and Migraine headaches and well controlled on suppression medication.  he has been compliant with current medications have reviewed them.  The patient denies medication side effects.  Has Relpax for acute migraine but Ubrelvy works better .will refill medications. BP 98/58   Pulse 67   Temp 97.5 °F (36.4 °C)   Resp 16   Ht 172.7 cm (68\")   Wt 76.2 kg (168 lb)   SpO2 94%   BMI 25.54 kg/m²     Results for orders placed or performed in visit on 10/18/22   Comprehensive Metabolic Panel    Specimen: Blood   Result Value Ref Range    Glucose 92 65 - 99 mg/dL    BUN 12 6 - 20 mg/dL    Creatinine 1.09 0.76 - 1.27 mg/dL    Sodium 136 136 - 145 mmol/L    Potassium 4.6 3.5 - 5.2 mmol/L    Chloride 100 98 - 107 mmol/L    CO2 27.2 22.0 - 29.0 mmol/L    Calcium 9.6 8.6 - 10.5 mg/dL    Total Protein 7.2 6.0 - 8.5 g/dL    Albumin 4.60 3.50 - 5.20 g/dL    ALT (SGPT) 17 1 - 41 U/L    AST (SGOT) 18 1 - 40 U/L    Alkaline Phosphatase 67 39 - 117 U/L    Total Bilirubin 0.5 0.0 - 1.2 mg/dL    Globulin 2.6 gm/dL    A/G Ratio 1.8 g/dL    BUN/Creatinine Ratio 11.0 7.0 - 25.0    Anion Gap 8.8 5.0 - 15.0 mmol/L    eGFR 83.7 >60.0 mL/min/1.73   CBC Auto Differential    Specimen: Blood   Result Value Ref Range    WBC 7.57 3.40 - 10.80 10*3/mm3    RBC 4.92 4.14 - 5.80 10*6/mm3    Hemoglobin 15.0 13.0 - " 17.7 g/dL    Hematocrit 43.2 37.5 - 51.0 %    MCV 87.8 79.0 - 97.0 fL    MCH 30.5 26.6 - 33.0 pg    MCHC 34.7 31.5 - 35.7 g/dL    RDW 12.9 12.3 - 15.4 %    RDW-SD 40.6 37.0 - 54.0 fl    MPV 12.6 (H) 6.0 - 12.0 fL    Platelets 125 (L) 140 - 450 10*3/mm3    Neutrophil % 52.0 42.7 - 76.0 %    Lymphocyte % 34.3 19.6 - 45.3 %    Monocyte % 9.2 5.0 - 12.0 %    Eosinophil % 3.0 0.3 - 6.2 %    Basophil % 1.1 0.0 - 1.5 %    Immature Grans % 0.4 0.0 - 0.5 %    Neutrophils, Absolute 3.93 1.70 - 7.00 10*3/mm3    Lymphocytes, Absolute 2.60 0.70 - 3.10 10*3/mm3    Monocytes, Absolute 0.70 0.10 - 0.90 10*3/mm3    Eosinophils, Absolute 0.23 0.00 - 0.40 10*3/mm3    Basophils, Absolute 0.08 0.00 - 0.20 10*3/mm3    Immature Grans, Absolute 0.03 0.00 - 0.05 10*3/mm3    nRBC 0.0 0.0 - 0.2 /100 WBC         Sees GI for IBS---saw JESSIE Saldana PA-C 6-13-23---on IB Guard  GERD is controlled on PPI Rx.  Needs to update labs and note he needs a citrated platelet level.  Rare migraine--Ubrelvy works well---has seen DR Cordova--  Use Cpap/Bipap every night---sees DR Naranjo  Sees cardio hx PSVT  Prior notes: ---2016  On beta blocker for hx sinus tachycardia; hx syncope  Loop recorder----released from cardio-----DR Moreno   Echo stress test 12-23-16  Conclusions:   Global left ventricular wall motion and contractility are within normal   limits.   The EF 4ch was calculated at 58%.   Normal left ventricular diastolic filling is observed.   Definity was used to optimize study.   Normal Sress Echo   Normal Stress EKG    Has been to neurology in the past and saw Dr. Cordova for migraines and hx concussions.  Last visit he reported that he was not taking his prevention medicine due to insurance.  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 male presents for follow up of insomnia with onset of symptoms years ago. Patient describes symptoms as early morning awakening, frequent night time awakening, difficulty falling asleep, and non-restful sleep. Patient has found no relief with Trazodone, Ambien (Zolpidem), OTC Benadryl, Melatonin, avoiding exercise prior to bedtime, going to bed at the same time every night and getting up at the same time, and avoiding naps. Associated symptoms include: fatigue if unable to take Rx . Patient denies history of addiction Symptoms have been well-controlled with taking current prescription medication.  The patient has failed multiple OTC medications for insomnia.  They are well controlled on current Rx and will continue to try to take Rx PRN.  He will use the lowest effective dose.  The patient has read and signed the [facility] Controlled Substance Contract.  I will continue to see patient for regular follow up appointments and be prescribed the lowest effective dose.  NELY has been reviewed by me and is updated every 3 months. The patient is aware of the potential for addiction and dependence.  He denies that  Ambien CR  causes excessive daytime drowsiness and sleep walking.  Patient voices understanding to take  Ambien CR  and go straight to bed. Patient must be able to sleep 7 hours or more when taking this and no alcohol.  Patient will hold Rx and contact me if they experience any impaired mental alertness the next day.  Do not drink alcohol with Ambien CR    The following portions of the patient's history were reviewed and updated as appropriate: allergies, current medications, past family history, past medical history, past social history, past surgical history, and problem list.    Review of Systems   Constitutional:  Positive for fatigue. Negative for diaphoresis.   HENT:  Negative for nosebleeds and trouble swallowing.    Eyes:  Negative for blurred vision and visual  disturbance.   Respiratory:  Negative for choking.    Gastrointestinal:  Negative for blood in stool.   Allergic/Immunologic: Negative for immunocompromised state.   Neurological:  Negative for facial asymmetry and speech difficulty.   Psychiatric/Behavioral:  Positive for sleep disturbance. Negative for self-injury and suicidal ideas. The patient is nervous/anxious and has insomnia.      Objective   Physical Exam  Vitals and nursing note reviewed.   Constitutional:       General: He is not in acute distress.     Appearance: Normal appearance. He is well-developed. He is not diaphoretic.   HENT:      Head: Normocephalic.      Right Ear: External ear normal.      Left Ear: External ear normal.   Eyes:      General: No scleral icterus.     Conjunctiva/sclera: Conjunctivae normal.      Pupils: Pupils are equal, round, and reactive to light.   Neck:      Vascular: No carotid bruit.   Cardiovascular:      Rate and Rhythm: Normal rate and regular rhythm.      Heart sounds: Normal heart sounds. No murmur heard.  Pulmonary:      Effort: Pulmonary effort is normal.      Breath sounds: Normal breath sounds. No rales.   Musculoskeletal:         General: Normal range of motion.      Cervical back: Normal range of motion and neck supple.      Right lower leg: No edema.      Left lower leg: No edema.   Skin:     General: Skin is warm and dry.      Findings: No rash.   Neurological:      General: No focal deficit present.      Mental Status: He is alert and oriented to person, place, and time.   Psychiatric:         Mood and Affect: Affect is not inappropriate.         Behavior: Behavior normal.         Thought Content: Thought content normal.         Judgment: Judgment normal.         Assessment & Plan   Diagnoses and all orders for this visit:    1. Idiopathic insomnia (Primary)    2. Hyperlipidemia, mixed    3. Migraine with aura and without status migrainosus, not intractable    4. LEN on CPAP    5. Vitamin D deficiency    6.  Primary insomnia    7. PSVT (paroxysmal supraventricular tachycardia)    8. Impaired fasting glucose    9. Gastroesophageal reflux disease without esophagitis      Use Cpap/Bipap every night  Continue metoprolol for treatment of heart rate in control from prior PSVT, working well   Go to [facility] for labs  Patient can call [place] [facility] to discuss his mood and anxiety to make appointment  Plan, Colby Duenas, was seen today.  he was seen for Imparied fasting glucose and plan follow up labs, diet, and exercise, GERD and will continue on PPI medication, Hyperlipidemia and will work on this with diet and exercise, Vitamin D deficiency and will update labs , Migraine headaches and well controlled on their suppressive medication, and CGRP inhibitor Ubrelvy works well for acute migraine .  Continue Ambien CR working well for insomnia  Labs         Answers submitted by the patient for this visit:  Primary Reason for Visit (Submitted on 9/10/2023)  What is the primary reason for your visit?: Other  Other (Submitted on 9/10/2023)  Please describe your symptoms.: Prescription refill  Have you had these symptoms before?: Yes  How long have you been having these symptoms?: 5-7 days  Please list any medications you are currently taking for this condition.: Ambien cr

## 2023-09-14 ENCOUNTER — LAB (OUTPATIENT)
Dept: LAB | Facility: HOSPITAL | Age: 48
End: 2023-09-14
Payer: COMMERCIAL

## 2023-09-14 LAB
25(OH)D3 SERPL-MCNC: 32.4 NG/ML (ref 30–100)
ALBUMIN SERPL-MCNC: 4.2 G/DL (ref 3.5–5.2)
ALBUMIN/GLOB SERPL: 1.5 G/DL
ALP SERPL-CCNC: 65 U/L (ref 39–117)
ALT SERPL W P-5'-P-CCNC: 16 U/L (ref 1–41)
ANION GAP SERPL CALCULATED.3IONS-SCNC: 9.6 MMOL/L (ref 5–15)
AST SERPL-CCNC: 20 U/L (ref 1–40)
BACTERIA UR QL AUTO: NORMAL /HPF
BASOPHILS # BLD AUTO: 0.09 10*3/MM3 (ref 0–0.2)
BASOPHILS NFR BLD AUTO: 1.3 % (ref 0–1.5)
BILIRUB SERPL-MCNC: 0.4 MG/DL (ref 0–1.2)
BILIRUB UR QL STRIP: NEGATIVE
BUN SERPL-MCNC: 22 MG/DL (ref 6–20)
BUN/CREAT SERPL: 20.6 (ref 7–25)
CALCIUM SPEC-SCNC: 9 MG/DL (ref 8.6–10.5)
CHLORIDE SERPL-SCNC: 103 MMOL/L (ref 98–107)
CHOLEST SERPL-MCNC: 156 MG/DL (ref 0–200)
CLARITY UR: CLEAR
CO2 SERPL-SCNC: 23.4 MMOL/L (ref 22–29)
COLOR UR: YELLOW
CREAT SERPL-MCNC: 1.07 MG/DL (ref 0.76–1.27)
DEPRECATED RDW RBC AUTO: 37.8 FL (ref 37–54)
EGFRCR SERPLBLD CKD-EPI 2021: 85.1 ML/MIN/1.73
EOSINOPHIL # BLD AUTO: 0.36 10*3/MM3 (ref 0–0.4)
EOSINOPHIL NFR BLD AUTO: 5.1 % (ref 0.3–6.2)
ERYTHROCYTE [DISTWIDTH] IN BLOOD BY AUTOMATED COUNT: 12.4 % (ref 12.3–15.4)
FOLATE SERPL-MCNC: 11.1 NG/ML (ref 4.78–24.2)
GLOBULIN UR ELPH-MCNC: 2.8 GM/DL
GLUCOSE SERPL-MCNC: 85 MG/DL (ref 65–99)
GLUCOSE UR STRIP-MCNC: NEGATIVE MG/DL
HBA1C MFR BLD: 5.6 % (ref 4.8–5.6)
HCT VFR BLD AUTO: 37.5 % (ref 37.5–51)
HDLC SERPL-MCNC: 50 MG/DL (ref 40–60)
HGB BLD-MCNC: 13 G/DL (ref 13–17.7)
HGB UR QL STRIP.AUTO: NEGATIVE
HYALINE CASTS UR QL AUTO: NORMAL /LPF
IMM GRANULOCYTES # BLD AUTO: 0.02 10*3/MM3 (ref 0–0.05)
IMM GRANULOCYTES NFR BLD AUTO: 0.3 % (ref 0–0.5)
KETONES UR QL STRIP: NEGATIVE
LDLC SERPL CALC-MCNC: 83 MG/DL (ref 0–100)
LDLC/HDLC SERPL: 1.59 {RATIO}
LEUKOCYTE ESTERASE UR QL STRIP.AUTO: NEGATIVE
LYMPHOCYTES # BLD AUTO: 2.57 10*3/MM3 (ref 0.7–3.1)
LYMPHOCYTES NFR BLD AUTO: 36.5 % (ref 19.6–45.3)
MAGNESIUM SERPL-MCNC: 2.1 MG/DL (ref 1.6–2.6)
MCH RBC QN AUTO: 29.5 PG (ref 26.6–33)
MCHC RBC AUTO-ENTMCNC: 34.7 G/DL (ref 31.5–35.7)
MCV RBC AUTO: 85.2 FL (ref 79–97)
MONOCYTES # BLD AUTO: 0.85 10*3/MM3 (ref 0.1–0.9)
MONOCYTES NFR BLD AUTO: 12.1 % (ref 5–12)
MUCOUS THREADS URNS QL MICRO: NORMAL /HPF
NEUTROPHILS NFR BLD AUTO: 3.16 10*3/MM3 (ref 1.7–7)
NEUTROPHILS NFR BLD AUTO: 44.7 % (ref 42.7–76)
NITRITE UR QL STRIP: NEGATIVE
NRBC BLD AUTO-RTO: 0 /100 WBC (ref 0–0.2)
PH UR STRIP.AUTO: 6 [PH] (ref 5–8)
PLATELET # BLD AUTO: 106 10*3/MM3 (ref 140–450)
PLATELETS (CITRATED) BY AUTOMATED COUNT: 216 10*3/MM3 (ref 140–450)
PMV BLD AUTO: 13 FL (ref 6–12)
POTASSIUM SERPL-SCNC: 4.7 MMOL/L (ref 3.5–5.2)
PROT SERPL-MCNC: 7 G/DL (ref 6–8.5)
PROT UR QL STRIP: NEGATIVE
RBC # BLD AUTO: 4.4 10*6/MM3 (ref 4.14–5.8)
RBC # UR STRIP: NORMAL /HPF
REF LAB TEST METHOD: NORMAL
SODIUM SERPL-SCNC: 136 MMOL/L (ref 136–145)
SP GR UR STRIP: >=1.03 (ref 1–1.03)
SQUAMOUS #/AREA URNS HPF: NORMAL /HPF
TRIGL SERPL-MCNC: 133 MG/DL (ref 0–150)
TSH SERPL DL<=0.05 MIU/L-ACNC: 1.74 UIU/ML (ref 0.27–4.2)
UROBILINOGEN UR QL STRIP: NORMAL
VIT B12 BLD-MCNC: 498 PG/ML (ref 211–946)
VLDLC SERPL-MCNC: 23 MG/DL (ref 5–40)
WBC # UR STRIP: NORMAL /HPF
WBC NRBC COR # BLD: 7.05 10*3/MM3 (ref 3.4–10.8)

## 2023-09-14 PROCEDURE — 82306 VITAMIN D 25 HYDROXY: CPT | Performed by: PHYSICIAN ASSISTANT

## 2023-09-14 PROCEDURE — 83735 ASSAY OF MAGNESIUM: CPT | Performed by: PHYSICIAN ASSISTANT

## 2023-09-14 PROCEDURE — 83036 HEMOGLOBIN GLYCOSYLATED A1C: CPT | Performed by: PHYSICIAN ASSISTANT

## 2023-09-14 PROCEDURE — 80061 LIPID PANEL: CPT | Performed by: PHYSICIAN ASSISTANT

## 2023-09-14 PROCEDURE — 85049 AUTOMATED PLATELET COUNT: CPT | Performed by: PHYSICIAN ASSISTANT

## 2023-09-14 PROCEDURE — 82746 ASSAY OF FOLIC ACID SERUM: CPT | Performed by: PHYSICIAN ASSISTANT

## 2023-09-14 PROCEDURE — 82607 VITAMIN B-12: CPT | Performed by: PHYSICIAN ASSISTANT

## 2023-09-14 PROCEDURE — 80050 GENERAL HEALTH PANEL: CPT | Performed by: PHYSICIAN ASSISTANT

## 2023-09-28 RX ORDER — MONTELUKAST SODIUM 10 MG/1
TABLET ORAL
Qty: 90 TABLET | Refills: 3 | Status: SHIPPED | OUTPATIENT
Start: 2023-09-28

## 2024-03-25 DIAGNOSIS — F51.01 IDIOPATHIC INSOMNIA: ICD-10-CM

## 2024-03-25 RX ORDER — ZOLPIDEM TARTRATE 12.5 MG/1
12.5 TABLET, FILM COATED, EXTENDED RELEASE ORAL NIGHTLY PRN
Qty: 30 TABLET | OUTPATIENT
Start: 2024-03-25

## 2024-04-24 ENCOUNTER — OFFICE VISIT (OUTPATIENT)
Dept: FAMILY MEDICINE CLINIC | Facility: CLINIC | Age: 49
End: 2024-04-24
Payer: COMMERCIAL

## 2024-04-24 VITALS
WEIGHT: 179 LBS | HEIGHT: 68 IN | OXYGEN SATURATION: 97 % | BODY MASS INDEX: 27.13 KG/M2 | RESPIRATION RATE: 16 BRPM | SYSTOLIC BLOOD PRESSURE: 118 MMHG | HEART RATE: 76 BPM | TEMPERATURE: 97.3 F | DIASTOLIC BLOOD PRESSURE: 72 MMHG

## 2024-04-24 DIAGNOSIS — K21.9 GASTROESOPHAGEAL REFLUX DISEASE WITHOUT ESOPHAGITIS: Chronic | ICD-10-CM

## 2024-04-24 DIAGNOSIS — G43.109 MIGRAINE WITH AURA AND WITHOUT STATUS MIGRAINOSUS, NOT INTRACTABLE: Primary | Chronic | ICD-10-CM

## 2024-04-24 DIAGNOSIS — F17.201 TOBACCO ABUSE, IN REMISSION: ICD-10-CM

## 2024-04-24 DIAGNOSIS — F51.01 IDIOPATHIC INSOMNIA: ICD-10-CM

## 2024-04-24 DIAGNOSIS — I47.10 PSVT (PAROXYSMAL SUPRAVENTRICULAR TACHYCARDIA): ICD-10-CM

## 2024-04-24 DIAGNOSIS — E55.9 VITAMIN D DEFICIENCY: Chronic | ICD-10-CM

## 2024-04-24 DIAGNOSIS — E78.2 HYPERLIPIDEMIA, MIXED: Chronic | ICD-10-CM

## 2024-04-24 DIAGNOSIS — F51.01 PRIMARY INSOMNIA: Chronic | ICD-10-CM

## 2024-04-24 DIAGNOSIS — G47.33 OSA ON CPAP: Chronic | ICD-10-CM

## 2024-04-24 PROCEDURE — 99214 OFFICE O/P EST MOD 30 MIN: CPT | Performed by: PHYSICIAN ASSISTANT

## 2024-04-24 RX ORDER — ZOLPIDEM TARTRATE 12.5 MG/1
12.5 TABLET, FILM COATED, EXTENDED RELEASE ORAL NIGHTLY PRN
Qty: 30 TABLET | Refills: 2 | Status: SHIPPED | OUTPATIENT
Start: 2024-04-24

## 2024-04-24 RX ORDER — PANTOPRAZOLE SODIUM 20 MG/1
20 TABLET, DELAYED RELEASE ORAL DAILY
Qty: 90 TABLET | Refills: 3 | Status: SHIPPED | OUTPATIENT
Start: 2024-04-24

## 2024-04-24 RX ORDER — RIMEGEPANT SULFATE 75 MG/75MG
75 TABLET, ORALLY DISINTEGRATING ORAL ONCE AS NEEDED
Qty: 16 TABLET | Refills: 11 | Status: SHIPPED | OUTPATIENT
Start: 2024-04-24

## 2024-04-24 RX ORDER — ERENUMAB-AOOE 140 MG/ML
1 INJECTION, SOLUTION SUBCUTANEOUS
Qty: 1 ML | Refills: 12 | Status: SHIPPED | OUTPATIENT
Start: 2024-04-24

## 2024-04-24 RX ORDER — METOPROLOL SUCCINATE 50 MG/1
50 TABLET, EXTENDED RELEASE ORAL EVERY MORNING
Qty: 90 TABLET | Refills: 3 | Status: SHIPPED | OUTPATIENT
Start: 2024-04-24

## 2024-04-24 NOTE — PROGRESS NOTES
"Subjective   Colby Duenas is a 49 y.o. male.     Insomnia  Pertinent negatives include no diaphoresis.       Since the last visit, he has overall felt fairly well.  He has GERD controlled on PPI Rx, Hyperlipidemia and working on this with diet and exercise, Vitamin D deficiency and labs are at goal >30 ng/mL, Migraine headaches and responding well to PRN triptan or CGPR inhibitor, and Migraine headaches and well controlled on suppression medication.  he has been compliant with current medications have reviewed them.  The patient denies medication side effects.  Will refill medications. /72   Pulse 76   Temp 97.3 °F (36.3 °C)   Resp 16   Ht 172.7 cm (68\")   Wt 81.2 kg (179 lb)   SpO2 97%   BMI 27.22 kg/m² .  BMI is >= 25 and <30. (Overweight) The following options were offered after discussion;: weight loss educational material (shared in after visit summary), exercise counseling/recommendations, and nutrition counseling/recommendations    Note that Ubrelvy was not formulary and patient's already failed Imitrex, Relpax, Maxalt, Zomig... I will send Nurtec to pharmacy  He remains on Aimovig for migraine suppression and this is working fantastic and do note he has been to neurologist Dr. Ridley for workup..  And has already been tried on Topamax, amitriptyline, Panalok, magnesium, co-Q10-----.  Quit smoking 8 yrs ago-------20 pk yr hx  Results for orders placed or performed in visit on 06/02/23   Platelet Ct On Citrated Bld    Specimen: Blood   Result Value Ref Range    Platelets (Citrated Blood) 216 140 - 450 10*3/mm3   Comprehensive metabolic panel    Specimen: Blood   Result Value Ref Range    Glucose 85 65 - 99 mg/dL    BUN 22 (H) 6 - 20 mg/dL    Creatinine 1.07 0.76 - 1.27 mg/dL    Sodium 136 136 - 145 mmol/L    Potassium 4.7 3.5 - 5.2 mmol/L    Chloride 103 98 - 107 mmol/L    CO2 23.4 22.0 - 29.0 mmol/L    Calcium 9.0 8.6 - 10.5 mg/dL    Total Protein 7.0 6.0 - 8.5 g/dL    Albumin 4.2 3.5 - 5.2 " g/dL    ALT (SGPT) 16 1 - 41 U/L    AST (SGOT) 20 1 - 40 U/L    Alkaline Phosphatase 65 39 - 117 U/L    Total Bilirubin 0.4 0.0 - 1.2 mg/dL    Globulin 2.8 gm/dL    A/G Ratio 1.5 g/dL    BUN/Creatinine Ratio 20.6 7.0 - 25.0    Anion Gap 9.6 5.0 - 15.0 mmol/L    eGFR 85.1 >60.0 mL/min/1.73   Lipid panel    Specimen: Blood   Result Value Ref Range    Total Cholesterol 156 0 - 200 mg/dL    Triglycerides 133 0 - 150 mg/dL    HDL Cholesterol 50 40 - 60 mg/dL    LDL Cholesterol  83 0 - 100 mg/dL    VLDL Cholesterol 23 5 - 40 mg/dL    LDL/HDL Ratio 1.59    TSH    Specimen: Blood   Result Value Ref Range    TSH 1.740 0.270 - 4.200 uIU/mL   Hemoglobin A1c    Specimen: Blood   Result Value Ref Range    Hemoglobin A1C 5.60 4.80 - 5.60 %   Vitamin B12    Specimen: Blood   Result Value Ref Range    Vitamin B-12 498 211 - 946 pg/mL   Folate    Specimen: Blood   Result Value Ref Range    Folate 11.10 4.78 - 24.20 ng/mL   Vitamin D,25-Hydroxy    Specimen: Blood   Result Value Ref Range    25 Hydroxy, Vitamin D 32.4 30.0 - 100.0 ng/ml   Magnesium    Specimen: Blood   Result Value Ref Range    Magnesium 2.1 1.6 - 2.6 mg/dL   CBC Auto Differential    Specimen: Blood   Result Value Ref Range    WBC 7.05 3.40 - 10.80 10*3/mm3    RBC 4.40 4.14 - 5.80 10*6/mm3    Hemoglobin 13.0 13.0 - 17.7 g/dL    Hematocrit 37.5 37.5 - 51.0 %    MCV 85.2 79.0 - 97.0 fL    MCH 29.5 26.6 - 33.0 pg    MCHC 34.7 31.5 - 35.7 g/dL    RDW 12.4 12.3 - 15.4 %    RDW-SD 37.8 37.0 - 54.0 fl    MPV 13.0 (H) 6.0 - 12.0 fL    Platelets 106 (L) 140 - 450 10*3/mm3    Neutrophil % 44.7 42.7 - 76.0 %    Lymphocyte % 36.5 19.6 - 45.3 %    Monocyte % 12.1 (H) 5.0 - 12.0 %    Eosinophil % 5.1 0.3 - 6.2 %    Basophil % 1.3 0.0 - 1.5 %    Immature Grans % 0.3 0.0 - 0.5 %    Neutrophils, Absolute 3.16 1.70 - 7.00 10*3/mm3    Lymphocytes, Absolute 2.57 0.70 - 3.10 10*3/mm3    Monocytes, Absolute 0.85 0.10 - 0.90 10*3/mm3    Eosinophils, Absolute 0.36 0.00 - 0.40 10*3/mm3     Basophils, Absolute 0.09 0.00 - 0.20 10*3/mm3    Immature Grans, Absolute 0.02 0.00 - 0.05 10*3/mm3    nRBC 0.0 0.0 - 0.2 /100 WBC   Urinalysis without microscopic (no culture) - Urine, Clean Catch    Specimen: Urine, Clean Catch   Result Value Ref Range    Color, UA Yellow Yellow, Straw    Appearance, UA Clear Clear    pH, UA 6.0 5.0 - 8.0    Specific Gravity, UA >=1.030 1.005 - 1.030    Glucose, UA Negative Negative    Ketones, UA Negative Negative    Bilirubin, UA Negative Negative    Blood, UA Negative Negative    Protein, UA Negative Negative    Leuk Esterase, UA Negative Negative    Nitrite, UA Negative Negative    Urobilinogen, UA 0.2 E.U./dL 0.2 - 1.0 E.U./dL   Urinalysis, Microscopic Only - Urine, Clean Catch    Specimen: Urine, Clean Catch   Result Value Ref Range    RBC, UA 0-2 None Seen, 0-2 /HPF    WBC, UA 0-2 None Seen, 0-2 /HPF    Bacteria, UA None Seen None Seen /HPF    Squamous Epithelial Cells, UA 0-2 None Seen, 0-2 /HPF    Hyaline Casts, UA 0-2 None Seen /LPF    Mucus, UA Trace None Seen, Trace /HPF    Methodology Manual Light Microscopy      9/15/2023  7:25 AM EDT       Triglycerides and HDL improved on your lipid panel.  Your cholesterol rescore is less than 7.5%.  Your other labs are in acceptable range     The 10-year ASCVD risk score (Sathya DK, et al., 2019) is: 1.6%   Continues on Toprol for heart rate control working well  Sees GI for IBS---saw JESSIE Saldana PA-C 6-13-23---on IB Guard  GERD is controlled on PPI Rx  Colby Duenas 49 y.o. male presents for follow up of insomnia with onset of symptoms years ago. Patient describes symptoms as early morning awakening, frequent night time awakening, difficulty falling asleep, and non-restful sleep. Patient has found no relief with Trazodone, OTC Benadryl, Melatonin, avoiding exercise prior to bedtime, going to bed at the same time every night and getting up at the same time, and avoiding naps. Associated symptoms include: fatigue if unable to  take Rx . Patient denies history of addiction Symptoms have been well-controlled with taking current prescription medication.  The patient has failed multiple OTC medications for insomnia.  They are well controlled on current Rx and will continue to try to take Rx PRN.  He will use the lowest effective dose.  The patient has read and signed the [facility] Controlled Substance Contract.  I will continue to see patient for regular follow up appointments and be prescribed the lowest effective dose.  NELY has been reviewed by me and is updated every 3 months. The patient is aware of the potential for addiction and dependence.  He denies that Ambien (Zolpidem) causes excessive daytime drowsiness and sleep walking.  Patient voices understanding to take Ambien (Zolpidem) and go straight to bed. Patient must be able to sleep 7 hours or more when taking this and no alcohol.  Patient will hold Rx and contact me if they experience any impaired mental alertness the next day.    Use Cpap/Bipap every night---sees DR Naranjo  Sees cardio hx PSVT  Prior notes: ---2016  On beta blocker for hx sinus tachycardia; hx syncope  Loop recorder----released from cardio-----DR Moreno   Echo stress test 12-23-16  Conclusions:   Global left ventricular wall motion and contractility are within normal   limits.   The EF 4ch was calculated at 58%.   Normal left ventricular diastolic filling is observed.   Definity was used to optimize study.   Normal Sress Echo   Normal Stress EKG    Has been to neurology in the past and saw Dr. Cordova for migraines and hx concussions.  Last visit he reported that he was not taking his prevention medicine due to insurance.  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    Review of Systems   Constitutional:  Negative for diaphoresis.   HENT:  Negative for nosebleeds and trouble swallowing.    Eyes:  Negative for blurred vision and visual disturbance.   Respiratory:  Negative for choking.    Gastrointestinal:  Negative for blood in stool.   Allergic/Immunologic: Negative for immunocompromised state.   Neurological:  Positive for headache. Negative for facial asymmetry and speech difficulty.   Psychiatric/Behavioral:  Positive for sleep disturbance. Negative for self-injury and suicidal ideas. The patient has insomnia.        Objective   Physical Exam  Vitals and nursing note reviewed.   Constitutional:       General: He is not in acute distress.     Appearance: Normal appearance. He is well-developed. He is not diaphoretic.   HENT:      Head: Normocephalic.      Right Ear: External ear normal.      Left Ear: External ear normal.   Eyes:      Conjunctiva/sclera: Conjunctivae normal.      Pupils: Pupils are equal, round, and reactive to light.   Cardiovascular:      Rate and Rhythm: Normal rate and regular rhythm.      Pulses: Normal pulses.      Heart sounds: Normal heart sounds. No murmur heard.  Pulmonary:      Effort: Pulmonary effort is normal.      Breath sounds: Normal breath sounds. No rales.   Musculoskeletal:         General: Normal range of motion.      Cervical back: Normal range of motion and neck supple.      Right lower leg: No edema.      Left lower leg: No edema.   Skin:     General: Skin is warm and dry.      Findings: No rash.   Neurological:      Mental Status: He is alert and oriented to person, place, and time.   Psychiatric:          Mood and Affect: Mood normal. Affect is not inappropriate.         Behavior: Behavior normal.         Thought Content: Thought content normal.         Judgment: Judgment normal.           Assessment & Plan   Diagnoses and all orders for this visit:    1. Migraine with aura and without status migrainosus, not intractable (Primary)    2. LEN on CPAP    3. Vitamin D deficiency    4. Gastroesophageal reflux disease without esophagitis    5. Primary insomnia    6. Hyperlipidemia, mixed    7. Tobacco abuse, in remission  -      CT Chest Low Dose Cancer Screening WO      Note that Ubrelvy was not formulary and patient's already failed Imitrex, Relpax, Maxalt, Zomig... I will send Nurtec to pharmacy  He remains on Aimovig for migraine suppression and this is working fantastic and do note he has been to neurologist Dr. Ridley for workup..  And has already been tried on Topamax, amitriptyline, Panalok, magnesium, co-Q10-----.  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  he was seen for Imparied fasting glucose and plan follow up labs, diet, and exercise, Hyperlipidemia and will work on this with diet and exercise, Vitamin D deficiency and supplemented, Migraine headaches and will continue with their PRN triptan or CGRP inhibitor, and Migraine headaches and well controlled on their suppressive medication.  Continue Ambien CR woking well insomnia  Continues on metoprolol tartrate heart rate control working well       Answers submitted by the patient for this visit:  Primary Reason for Visit (Submitted on 4/23/2024)  What is the primary reason for your visit?: Other  Other (Submitted on 4/23/2024)  Please describe your symptoms.: Prescription refill  Have you had these symptoms before?: Yes  How long have you been having these symptoms?: 1-4 days  Please  list any medications you are currently taking for this condition.: Ambien cr

## 2024-04-24 NOTE — LETTER
Controlled Substance Prescribing Agreement          I, Colby Duenas [PATIENT],  1975 [] a patient of  Mary Lopez PA-C   [PROVIDER] at [facility] [PRACTICE], have been informed that  individuals who are prescribed certain Controlled Substances including, but not limited to, narcotic pain medicines, stimulants, benzodiazepine tranquilizers, and barbiturate sedatives, can abuse those substances or may allow abuse by others, and have some risk of developing an addictive disorder or suffering a relapse of a prior addiction. Therefore, I have been informed that it is necessary to observe strict rules pertaining to their use, and I agree to follow the terms and procedures described in this Agreement as consideration for, and as a condition of, the willingness of the physician whose signature appears below to consider prescribing or to continue prescribing Controlled Substances to treat my pain.     *Patient taking Ambien for Insomnia*    1. I will inform my physician of any current or past substance abuse, or any current or past substance abuse of any immediate member of my immediate family.     2. I agree that I may be subject to a voluntary evaluation by psychologists and/or psychiatrists, possibly at my own expense, before any Controlled Substances will be prescribed to me. I agree that the need to be evaluated by psychologists and/or psychiatrists may be revisited every three (3) to six (6) months thereafter while taking the medication.     3. All Controlled Substances must come from a provider in the PROVIDER’S PRACTICE. My Controlled Substances will come from the PROVIDER whose signature appears below, or during his or her absence, by the covering provider, unless specific written authorization is obtained from the office for an exception.     4. I will obtain all Controlled Substances from the same pharmacy. Should the need arise to change pharmacies, I will inform  the PROVIDER’S office.     5. I will inform the PROVIDER’S office of any new medications or medical conditions, and of any adverse effects I experience from any of the medications that I take.     6. I will inform my other health care providers that I am taking the Controlled Substances listed above, and of the existence of this Agreement. In the event of an emergency, I will provide the foregoing information to emergency department providers.     7. I agree that my prescribing PROVIDER has permission to discuss all diagnostic and treatment details with other health care providers, pharmacists, or other professionals who provide my health care regarding my use of Controlled Substances for purposes of maintaining accountability.     8. I will not allow anyone else to have, use sell, or otherwise have access to these medications. The sharing of medications with anyone is absolutely forbidden and is against the law.         9. I understand that Controlled Substances may be hazardous or lethal to a person who is not tolerant to their effects, especially a child, and that I must keep them out of reach of such people for their own safety.     10. I understand that tampering with a written prescription is a felony and I will not change or tamper with the PROVIDER’S written prescription.     11. I am aware that attempting to obtain a Controlled Substance under false pretenses is illegal.     12. I agree not to alter my medication in any way, and I will take my medication whole, and it will not be broken, chewed, crushed, injected, or snorted.     13. I will take my medication as instructed and prescribed, and I will not exceed the maximum prescribed dose. Any change in dosage must be approved by the PROVIDER or a physician within the PRACTICE.     14. I understand that these drugs should not be stopped abruptly, as withdrawal syndromes may develop.     15. I will cooperate with unannounced urine or serum toxicology  screenings as may be requested, as well as any random pill counts of medication by the PROVIDER. Failure to comply may result in termination of the PROVIDER-patient relationship.     16. I understand that the presence of unauthorized and/or illegal substances in the screenings described in the paragraph above may prompt referral for assessment for a substance abuse disorder or termination of the PROVIDER-patient relationship.     17. I understand that medications may not be replaced if they are lost, damaged, or stolen. If any of these situations arise that cause me to request an early refill of my medication, a copy of a filed police report or a statement from me explaining the circumstances may be required before additional prescriptions are considered. If I request an early refill secondary to lost, damaged, or stolen prescriptions twice within a year, I may be discharged from the practice.     18. I understand that a prescription may be given early if the PROVIDER or the patient will be out of town when the refill is due. These prescriptions will contain instructions to the pharmacist that the prescriptions(s) may not be filled prior to the appropriate date.     19. If the responsible legal authorities have questions concerning my treatment, as may occur, for example, if I obtained medication at several pharmacies, all confidentiality is waived, and these authorities may be given full access to my full records of Controlled Substances administration.     20. I will keep my scheduled appointments in order to receive medication renewals. If I need to cancel my appointment, I will do so a minimum of twenty-four (24) hours before it is scheduled.     21. I understand that I may be asked to bring my medications in their original container to the PROVIDER’s office while I am on controlled medication.     22. Refills generally will not be given over the phone, after office hours, during the weekends, and on holidays.      23. I understand that any medical treatment is initially a trial, with the goal of treatment being to improve the quality of life and ability to function and/or work. These parameters will be assessed periodically to determine the benefits of continued therapy, and continued prescription is contingent on whether my physician believes that the medication usage benefits me. I will comply with all treatments as outlined by the PROVIDER.     24. I have been explained the risks and potential benefits of these therapies, including, but not limited to, psychological addiction, physical dependence, withdrawal and over dosage.     25. I understand that failure to adhere to these policies and/or failure to comply with the PROVIDER’S treatment plan may result in cessation of therapy with Controlled Substance prescribing by the PROVIDER or referral for further specialty assessment, as well as possible discharge from the PRACTICE.     26. I, the undersigned patient, attest that the foregoing was discussed with me, and that I have read, fully understand, and agree to all of the above requirements and instructions. I affirm that I have the full right and power to sign and be bound by this  Agreement.         Date:  __________________________________________    Time:  __________________________________________    Patient Printed Name:  _____________________________    Patient Signature:  ________________________________           Date:  __________________________________________    Time:  __________________________________________    Provider Signature:  _______________________________

## 2024-04-26 ENCOUNTER — LAB (OUTPATIENT)
Dept: LAB | Facility: HOSPITAL | Age: 49
End: 2024-04-26
Payer: COMMERCIAL

## 2024-04-26 PROCEDURE — 80307 DRUG TEST PRSMV CHEM ANLYZR: CPT | Performed by: PHYSICIAN ASSISTANT

## 2024-04-26 PROCEDURE — G0480 DRUG TEST DEF 1-7 CLASSES: HCPCS | Performed by: PHYSICIAN ASSISTANT

## 2024-05-01 LAB
1OH-MIDAZOLAM UR QL SCN: NOT DETECTED NG/MG CREAT
6MAM UR QL SCN: NEGATIVE NG/ML
7AMINOCLONAZEPAM/CREAT UR: NOT DETECTED NG/MG CREAT
A-OH ALPRAZ/CREAT UR: NOT DETECTED NG/MG CREAT
A-OH-TRIAZOLAM/CREAT UR CFM: NOT DETECTED NG/MG CREAT
ACP UR QL CFM: NOT DETECTED
ALPRAZ/CREAT UR CFM: NOT DETECTED NG/MG CREAT
AMPHETAMINES UR QL SCN: NEGATIVE NG/ML
APAP UR QL SCN: NEGATIVE UG/ML
BARBITURATES UR QL SCN: NEGATIVE NG/ML
BENZODIAZ SCN METH UR: NEGATIVE
BUPRENORPHINE UR QL SCN: NEGATIVE
BUPRENORPHINE/CREAT UR: NOT DETECTED NG/MG CREAT
CANNABINOIDS UR QL SCN: NEGATIVE NG/ML
CARISOPRODOL UR QL: NEGATIVE NG/ML
CLONAZEPAM/CREAT UR CFM: NOT DETECTED NG/MG CREAT
COCAINE+BZE UR QL SCN: NEGATIVE NG/ML
CREAT UR-MCNC: 48 MG/DL
D-METHORPHAN UR-MCNC: NOT DETECTED NG/ML
D-METHORPHAN+LEVORPHANOL UR QL: NOT DETECTED
DESALKYLFLURAZ/CREAT UR: NOT DETECTED NG/MG CREAT
DIAZEPAM/CREAT UR: NOT DETECTED NG/MG CREAT
ETHANOL UR QL SCN: NEGATIVE G/DL
ETHANOL UR QL SCN: NEGATIVE NG/ML
FENTANYL CTO UR SCN-MCNC: NEGATIVE NG/ML
FENTANYL/CREAT UR: NOT DETECTED NG/MG CREAT
FLUNITRAZEPAM UR QL SCN: NOT DETECTED NG/MG CREAT
GABAPENTIN UR-MCNC: NEGATIVE UG/ML
HYPNOTICS UR QL SCN: NORMAL
KETAMINE UR QL: NOT DETECTED
LORAZEPAM/CREAT UR: NOT DETECTED NG/MG CREAT
MEPERIDINE UR QL SCN: NEGATIVE NG/ML
METHADONE UR QL SCN: NEGATIVE NG/ML
METHADONE+METAB UR QL SCN: NEGATIVE NG/ML
MIDAZOLAM/CREAT UR CFM: NOT DETECTED NG/MG CREAT
MISCELLANEOUS, UR: NEGATIVE
NORBUPRENORPHINE/CREAT UR: NOT DETECTED NG/MG CREAT
NORDIAZEPAM/CREAT UR: NOT DETECTED NG/MG CREAT
NORFENTANYL/CREAT UR: NOT DETECTED NG/MG CREAT
NORFLUNITRAZEPAM UR-MCNC: NOT DETECTED NG/MG CREAT
NORKETAMINE UR-MCNC: NOT DETECTED UG/ML
OPIATES UR SCN-MCNC: NEGATIVE NG/ML
OTHER HALLUCINOGENS UR: NEGATIVE
OXAZEPAM/CREAT UR: NOT DETECTED NG/MG CREAT
OXYCODONE CTO UR SCN-MCNC: NEGATIVE NG/ML
PCP UR QL SCN: NEGATIVE NG/ML
PRESCRIBED MEDICATIONS: NORMAL
PROPOXYPH UR QL SCN: NEGATIVE NG/ML
TAPENTADOL CTO UR SCN-MCNC: NEGATIVE NG/ML
TEMAZEPAM/CREAT UR: NOT DETECTED NG/MG CREAT
TRAMADOL UR QL SCN: NEGATIVE NG/ML
ZALEPLON UR-MCNC: NOT DETECTED NG/ML
ZOLPIDEM PHENYL-4-CARB UR QL SCN: PRESENT
ZOLPIDEM UR QL SCN: NOT DETECTED
ZOPICLONE-N-OXIDE UR-MCNC: NOT DETECTED NG/ML

## 2024-05-21 ENCOUNTER — PRIOR AUTHORIZATION (OUTPATIENT)
Dept: FAMILY MEDICINE CLINIC | Facility: CLINIC | Age: 49
End: 2024-05-21
Payer: COMMERCIAL

## 2024-06-30 RX ORDER — METOPROLOL SUCCINATE 50 MG/1
TABLET, EXTENDED RELEASE ORAL
Qty: 90 TABLET | Refills: 3 | Status: SHIPPED | OUTPATIENT
Start: 2024-06-30

## 2024-07-25 DIAGNOSIS — F51.01 IDIOPATHIC INSOMNIA: ICD-10-CM

## 2024-07-25 RX ORDER — ZOLPIDEM TARTRATE 12.5 MG/1
12.5 TABLET, FILM COATED, EXTENDED RELEASE ORAL NIGHTLY PRN
Qty: 30 TABLET | Refills: 0 | Status: SHIPPED | OUTPATIENT
Start: 2024-07-25

## 2024-07-25 RX ORDER — ERENUMAB-AOOE 140 MG/ML
1 INJECTION, SOLUTION SUBCUTANEOUS
Qty: 1 ML | Refills: 12 | Status: SHIPPED | OUTPATIENT
Start: 2024-07-25

## 2024-08-14 ENCOUNTER — LAB (OUTPATIENT)
Dept: LAB | Facility: HOSPITAL | Age: 49
End: 2024-08-14
Payer: COMMERCIAL

## 2024-08-14 ENCOUNTER — TRANSCRIBE ORDERS (OUTPATIENT)
Dept: ADMINISTRATIVE | Facility: HOSPITAL | Age: 49
End: 2024-08-14
Payer: COMMERCIAL

## 2024-08-14 ENCOUNTER — OFFICE VISIT (OUTPATIENT)
Dept: FAMILY MEDICINE CLINIC | Facility: CLINIC | Age: 49
End: 2024-08-14
Payer: COMMERCIAL

## 2024-08-14 VITALS
OXYGEN SATURATION: 94 % | BODY MASS INDEX: 26.07 KG/M2 | DIASTOLIC BLOOD PRESSURE: 60 MMHG | WEIGHT: 172 LBS | HEART RATE: 62 BPM | SYSTOLIC BLOOD PRESSURE: 100 MMHG | TEMPERATURE: 97.3 F | HEIGHT: 68 IN | RESPIRATION RATE: 16 BRPM

## 2024-08-14 DIAGNOSIS — E78.2 HYPERLIPIDEMIA, MIXED: ICD-10-CM

## 2024-08-14 DIAGNOSIS — D69.1 PLATELET DYSFUNCTION: ICD-10-CM

## 2024-08-14 DIAGNOSIS — F51.01 PRIMARY INSOMNIA: ICD-10-CM

## 2024-08-14 DIAGNOSIS — F17.201 TOBACCO ABUSE, IN REMISSION: ICD-10-CM

## 2024-08-14 DIAGNOSIS — G47.33 OSA ON CPAP: ICD-10-CM

## 2024-08-14 DIAGNOSIS — K21.9 GASTROESOPHAGEAL REFLUX DISEASE WITHOUT ESOPHAGITIS: ICD-10-CM

## 2024-08-14 DIAGNOSIS — G43.109 MIGRAINE WITH AURA AND WITHOUT STATUS MIGRAINOSUS, NOT INTRACTABLE: Primary | ICD-10-CM

## 2024-08-14 DIAGNOSIS — Z12.5 SCREENING PSA (PROSTATE SPECIFIC ANTIGEN): ICD-10-CM

## 2024-08-14 DIAGNOSIS — G43.109 MIGRAINE WITH AURA AND WITHOUT STATUS MIGRAINOSUS, NOT INTRACTABLE: ICD-10-CM

## 2024-08-14 DIAGNOSIS — E55.9 VITAMIN D DEFICIENCY: ICD-10-CM

## 2024-08-14 DIAGNOSIS — I47.10 PSVT (PAROXYSMAL SUPRAVENTRICULAR TACHYCARDIA): ICD-10-CM

## 2024-08-14 DIAGNOSIS — D22.9 ATYPICAL NEVI: ICD-10-CM

## 2024-08-14 DIAGNOSIS — G47.33 OSA ON CPAP: Primary | ICD-10-CM

## 2024-08-14 DIAGNOSIS — Z12.11 SCREEN FOR COLON CANCER: ICD-10-CM

## 2024-08-14 DIAGNOSIS — F51.01 IDIOPATHIC INSOMNIA: ICD-10-CM

## 2024-08-14 LAB
25(OH)D3 SERPL-MCNC: 31.4 NG/ML (ref 30–100)
ALBUMIN SERPL-MCNC: 4.2 G/DL (ref 3.5–5.2)
ALBUMIN/GLOB SERPL: 1.4 G/DL
ALP SERPL-CCNC: 68 U/L (ref 39–117)
ALT SERPL W P-5'-P-CCNC: 14 U/L (ref 1–41)
ANION GAP SERPL CALCULATED.3IONS-SCNC: 11.7 MMOL/L (ref 5–15)
AST SERPL-CCNC: 16 U/L (ref 1–40)
BACTERIA UR QL AUTO: NORMAL /HPF
BASOPHILS # BLD AUTO: 0.05 10*3/MM3 (ref 0–0.2)
BASOPHILS NFR BLD AUTO: 0.6 % (ref 0–1.5)
BILIRUB SERPL-MCNC: 0.4 MG/DL (ref 0–1.2)
BILIRUB UR QL STRIP: NEGATIVE
BUN SERPL-MCNC: 15 MG/DL (ref 6–20)
BUN/CREAT SERPL: 14.6 (ref 7–25)
CALCIUM SPEC-SCNC: 8.9 MG/DL (ref 8.6–10.5)
CHLORIDE SERPL-SCNC: 103 MMOL/L (ref 98–107)
CHOLEST SERPL-MCNC: 172 MG/DL (ref 0–200)
CLARITY UR: CLEAR
CO2 SERPL-SCNC: 23.3 MMOL/L (ref 22–29)
COLOR UR: YELLOW
CREAT SERPL-MCNC: 1.03 MG/DL (ref 0.76–1.27)
DEPRECATED RDW RBC AUTO: 43 FL (ref 37–54)
EGFRCR SERPLBLD CKD-EPI 2021: 88.5 ML/MIN/1.73
EOSINOPHIL # BLD AUTO: 0.41 10*3/MM3 (ref 0–0.4)
EOSINOPHIL NFR BLD AUTO: 5.2 % (ref 0.3–6.2)
ERYTHROCYTE [DISTWIDTH] IN BLOOD BY AUTOMATED COUNT: 13.1 % (ref 12.3–15.4)
FOLATE SERPL-MCNC: 12.1 NG/ML (ref 4.78–24.2)
GLOBULIN UR ELPH-MCNC: 3 GM/DL
GLUCOSE SERPL-MCNC: 81 MG/DL (ref 65–99)
GLUCOSE UR STRIP-MCNC: NEGATIVE MG/DL
HCT VFR BLD AUTO: 39.2 % (ref 37.5–51)
HDLC SERPL-MCNC: 55 MG/DL (ref 40–60)
HGB BLD-MCNC: 13.1 G/DL (ref 13–17.7)
HGB UR QL STRIP.AUTO: NEGATIVE
HYALINE CASTS UR QL AUTO: NORMAL /LPF
IMM GRANULOCYTES # BLD AUTO: 0.03 10*3/MM3 (ref 0–0.05)
IMM GRANULOCYTES NFR BLD AUTO: 0.4 % (ref 0–0.5)
KETONES UR QL STRIP: NEGATIVE
LDLC SERPL CALC-MCNC: 105 MG/DL (ref 0–100)
LDLC/HDLC SERPL: 1.91 {RATIO}
LEUKOCYTE ESTERASE UR QL STRIP.AUTO: NEGATIVE
LYMPHOCYTES # BLD AUTO: 2.84 10*3/MM3 (ref 0.7–3.1)
LYMPHOCYTES NFR BLD AUTO: 36.1 % (ref 19.6–45.3)
MAGNESIUM SERPL-MCNC: 1.9 MG/DL (ref 1.6–2.6)
MCH RBC QN AUTO: 30.3 PG (ref 26.6–33)
MCHC RBC AUTO-ENTMCNC: 33.4 G/DL (ref 31.5–35.7)
MCV RBC AUTO: 90.7 FL (ref 79–97)
MONOCYTES # BLD AUTO: 0.75 10*3/MM3 (ref 0.1–0.9)
MONOCYTES NFR BLD AUTO: 9.5 % (ref 5–12)
NEUTROPHILS NFR BLD AUTO: 3.79 10*3/MM3 (ref 1.7–7)
NEUTROPHILS NFR BLD AUTO: 48.2 % (ref 42.7–76)
NITRITE UR QL STRIP: NEGATIVE
NRBC BLD AUTO-RTO: 0 /100 WBC (ref 0–0.2)
PH UR STRIP.AUTO: 6 [PH] (ref 5–8)
PLATELET # BLD AUTO: 104 10*3/MM3 (ref 140–450)
PLATELETS (CITRATED) BY AUTOMATED COUNT: 243 10*3/MM3 (ref 140–450)
PMV BLD AUTO: 12.4 FL (ref 6–12)
POTASSIUM SERPL-SCNC: 4.3 MMOL/L (ref 3.5–5.2)
PROT SERPL-MCNC: 7.2 G/DL (ref 6–8.5)
PROT UR QL STRIP: NEGATIVE
PSA SERPL-MCNC: 0.21 NG/ML (ref 0–4)
RBC # BLD AUTO: 4.32 10*6/MM3 (ref 4.14–5.8)
RBC # UR STRIP: NORMAL /HPF
REF LAB TEST METHOD: NORMAL
SODIUM SERPL-SCNC: 138 MMOL/L (ref 136–145)
SP GR UR STRIP: 1.02 (ref 1–1.03)
SQUAMOUS #/AREA URNS HPF: NORMAL /HPF
T4 FREE SERPL-MCNC: 1.2 NG/DL (ref 0.92–1.68)
TRIGL SERPL-MCNC: 61 MG/DL (ref 0–150)
TSH SERPL DL<=0.05 MIU/L-ACNC: 1.79 UIU/ML (ref 0.27–4.2)
UROBILINOGEN UR QL STRIP: NORMAL
VIT B12 BLD-MCNC: 505 PG/ML (ref 211–946)
VLDLC SERPL-MCNC: 12 MG/DL (ref 5–40)
WBC # UR STRIP: NORMAL /HPF
WBC NRBC COR # BLD AUTO: 7.87 10*3/MM3 (ref 3.4–10.8)

## 2024-08-14 PROCEDURE — 36415 COLL VENOUS BLD VENIPUNCTURE: CPT

## 2024-08-14 PROCEDURE — 82607 VITAMIN B-12: CPT

## 2024-08-14 PROCEDURE — 83735 ASSAY OF MAGNESIUM: CPT

## 2024-08-14 PROCEDURE — 85049 AUTOMATED PLATELET COUNT: CPT

## 2024-08-14 PROCEDURE — 82306 VITAMIN D 25 HYDROXY: CPT

## 2024-08-14 PROCEDURE — G0103 PSA SCREENING: HCPCS

## 2024-08-14 PROCEDURE — 84439 ASSAY OF FREE THYROXINE: CPT

## 2024-08-14 PROCEDURE — 81001 URINALYSIS AUTO W/SCOPE: CPT | Performed by: PHYSICIAN ASSISTANT

## 2024-08-14 PROCEDURE — 80061 LIPID PANEL: CPT

## 2024-08-14 PROCEDURE — 80050 GENERAL HEALTH PANEL: CPT | Performed by: PHYSICIAN ASSISTANT

## 2024-08-14 PROCEDURE — 99214 OFFICE O/P EST MOD 30 MIN: CPT | Performed by: PHYSICIAN ASSISTANT

## 2024-08-14 PROCEDURE — 82746 ASSAY OF FOLIC ACID SERUM: CPT

## 2024-08-14 RX ORDER — ELETRIPTAN HYDROBROMIDE 20 MG/1
20 TABLET, FILM COATED ORAL ONCE AS NEEDED
Qty: 15 TABLET | Refills: 11 | Status: SHIPPED | OUTPATIENT
Start: 2024-08-14

## 2024-08-14 RX ORDER — ZOLPIDEM TARTRATE 12.5 MG/1
12.5 TABLET, FILM COATED, EXTENDED RELEASE ORAL NIGHTLY PRN
Qty: 90 TABLET | Refills: 0 | Status: SHIPPED | OUTPATIENT
Start: 2024-08-14

## 2024-08-14 RX ORDER — RIMEGEPANT SULFATE 75 MG/75MG
75 TABLET, ORALLY DISINTEGRATING ORAL ONCE AS NEEDED
Qty: 16 TABLET | Refills: 11 | Status: SHIPPED | OUTPATIENT
Start: 2024-08-14

## 2024-08-14 NOTE — PROGRESS NOTES
"Subjective   Colby Duenas is a 49 y.o. male.     Hypertension  Pertinent negatives include no blurred vision.   Hyperlipidemia    Insomnia  Pertinent negatives include no diaphoresis.       Since the last visit, he has overall felt well.  He has GERD controlled on PPI Rx, Hyperlipidemia and working on this with diet and exercise, Seasonal allergies and doing well on their medication , Vitamin D deficiency and will update labs for continued management, Migraine headaches and responding well to PRN triptan or CGPR inhibitor, and Migraine headaches and well controlled on suppression medication.  he has been compliant with current medications have reviewed them.  The patient denies medication side effects.  Will refill medications. /60   Pulse 62   Temp 97.3 °F (36.3 °C)   Resp 16   Ht 172.7 cm (68\")   Wt 78 kg (172 lb)   SpO2 94%   BMI 26.15 kg/m² .          Results for orders placed or performed in visit on 04/24/24   ToxAssure Flex 23, Ur -    Specimen: Urine   Result Value Ref Range    Report Summary FINAL     CREATININE 48 mg/dL    Amphetamines, IA Negative CUTOFF:300 ng/mL    Benzodiazepines Negative     Diazepam Urine, Qualitative Not Detected ng/mg creat    Desmethyldiazepam Not Detected ng/mg creat    Oxazepam, urine Not Detected ng/mg creat    Temazepam Not Detected ng/mg creat    Alprazolam Urine, Conf Not Detected ng/mg creat    Alpha-hydroxyalprazolam, Urine Not Detected ng/mg creat    Desalkylflurazepam, Urine Not Detected ng/mg creat    Lorazepam, Urine Not Detected ng/mg creat    Alpha-hydroxytriazolam, Urine Not Detected ng/mg creat    Clonazepam Not Detected ng/mg creat    7- AMINOCLONAZEPAM Not Detected ng/mg creat    Midazolam, Urine Not Detected ng/mg creat    Alpha-hydroxymidazolam, Urine Not Detected ng/mg creat    Flunitrazepam Not Detected ng/mg creat    DESMETHYLFLUNITRAZEPAM Not Detected ng/mg creat    COCAINE / METABOLITE, IA Negative CUTOFF:150 ng/mL    Ethyl Alcohol, Enz " Negative CUTOFF:0.020 g/dL    Ethanol and Ethanol Biomarkers Negative CUTOFF:500 ng/mL    Cannavinoids IA Negative CUTOFF:20 ng/mL    6-Acetylmorphine IA Negative CUTOFF:10 ng/mL    Opiate Class IA Negative CUTOFF:100 ng/mL    Oxycodone Class IA Negative CUTOFF:100 ng/mL    METHADONE, IA Negative CUTOFF:100 ng/mL    Methadone MTB IA Negative CUTOFF:100 ng/mL    Buprenorphine, Urine Negative     Buprenorphine, Urine Not Detected ng/mg creat    Norbuprenorphine Not Detected ng/mg creat    Fentanyl Negative     Fentanyl, Urine Not Detected ng/mg creat    Norfentanyl Urine Not Detected ng/mg creat    Tapentadol, IA Negative CUTOFF:200 ng/mL    MEPERIDINE, IA Negative CUTOFF:200 ng/mL    PROPOXYPHENE, IA Negative CUTOFF:300 ng/mL    TRAMADOL, IA Negative CUTOFF:200 ng/mL    Barbiturates, IA Negative CUTOFF:200 ng/mL    PHENCYCLIDINE, IA Negative CUTOFF:25 ng/mL    Other Hallucinogens Ur Negative     Ketamine Not Detected     Norketamine Not Detected     Gabapentin, IA Negative CUTOFF:1.0 ug/mL    Carisoprodol, IA Negative CUTOFF:100 ng/mL    SEDATIVES/HYPNOTICS +POSITIVE+     Zolpidem(Ambien) Urine Not Detected     Zolpidem Acid PRESENT     Eszopiclone/Zopiclone Not Detected     Amino Chloropyridine Not Detected     Zaleplon, Ur Not Detected     Acetaminophen, IA Negative CUTOFF:5.0 ug/mL    Miscellaneous, Ur Negative     DEXTROMETHORPHAN Not Detected     Dextrorphan/Levorphanol Not Detected        Colby Duenas 49 y.o. male presents for follow up of insomnia with onset of symptoms years ago. Patient describes symptoms as early morning awakening, frequent night time awakening, difficulty falling asleep, and non-restful sleep. Patient has found no relief with Trazodone, OTC Benadryl, Tylenol PM OTC, Advil PM OTC, Melatonin, avoiding exercise prior to bedtime, going to bed at the same time every night and getting up at the same time, and avoiding naps. Associated symptoms include: fatigue if unable to take Rx .  Patient denies history of addiction Symptoms have been well-controlled with taking current prescription medication.  The patient has failed multiple OTC medications for insomnia.  They are well controlled on current Rx and will continue to try to take Rx PRN.  He will use the lowest effective dose.  The patient has read and signed the [facility] Controlled Substance Contract.  I will continue to see patient for regular follow up appointments and be prescribed the lowest effective dose.  NELY has been reviewed by me and is updated every 3 months. The patient is aware of the potential for addiction and dependence.  He denies that Ambien (Zolpidem) causes excessive daytime drowsiness and sleep walking.  Patient voices understanding to take Ambien (Zolpidem) and go straight to bed. Patient must be able to sleep 7 hours or more when taking this and no alcohol.  Patient will hold Rx and contact me if they experience any impaired mental alertness the next day.      Note that Ubrelvy was not formulary and patient's already failed Imitrex, Relpax, Maxalt, Zomig... I will send [facility] to pharmacy  He remains on Aimovig for migraine suppression and this is working fantastic and do note he has been to neurologist Dr. Ridley for workup..  And has already been tried on Topamax, amitriptyline, Panalok, magnesium, co-Q10-----.  Quit smoking 8 yrs ago-------20 pk yr hx  Continues on Toprol for heart rate control working well  Sees GI for IBS---saw JESSIE Saldana PA-C 6-13-23---on IB Guard  GERD is controlled on PPI Rx  Use Cpap/Bipap every night---sees DR Naranjo  Sees cardio hx PSVT  Prior notes: ---2016  On beta blocker for hx sinus tachycardia; hx syncope  Loop recorder----released from cardio-----DR Moreno   Echo stress test 12-23-16  Conclusions:   Global left ventricular wall motion and contractility are within normal   limits.   The EF 4ch was calculated at 58%.   Normal left ventricular diastolic filling is observed.    Definity was used to optimize study.   Normal Sress Echo   Normal Stress EKG    Has been to neurology in the past and saw Dr. Cordova for migraines and hx concussions.  Last visit he reported that he was not taking his prevention medicine due to insurance.  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    Review of Systems   Constitutional:  Negative for diaphoresis.   HENT:  Negative for nosebleeds and trouble swallowing.    Eyes:  Negative for blurred vision and visual disturbance.   Respiratory:  Negative for choking.    Gastrointestinal:  Negative for blood in stool.   Allergic/Immunologic: Negative for immunocompromised state.   Neurological:  Negative for facial asymmetry and speech difficulty.   Psychiatric/Behavioral:  Negative for self-injury and suicidal ideas. The patient has insomnia.        Objective   Physical Exam  Vitals and nursing note reviewed.   Constitutional:       General: He is not in acute distress.     Appearance: Normal appearance. He is well-developed. He is not diaphoretic.   HENT:      Head: Normocephalic.   Eyes:      Conjunctiva/sclera: Conjunctivae normal.      Pupils: Pupils are equal, round, and reactive to light.   Cardiovascular:      Rate and Rhythm: Normal rate and regular rhythm.      Heart sounds: Normal heart sounds. No murmur heard.  Pulmonary:      Effort: Pulmonary effort is normal.      Breath  sounds: Normal breath sounds.   Musculoskeletal:         General: Normal range of motion.      Cervical back: Normal range of motion and neck supple.   Skin:     General: Skin is warm and dry.      Findings: Lesion present. No rash.      Comments: 2 atypical nevi right flank   Neurological:      Mental Status: He is alert and oriented to person, place, and time.   Psychiatric:         Mood and Affect: Affect is not inappropriate.         Behavior: Behavior normal.         Thought Content: Thought content normal.         Judgment: Judgment normal.           Assessment & Plan   Diagnoses and all orders for this visit:    1. Migraine with aura and without status migrainosus, not intractable (Primary)    2. LEN on CPAP    3. Primary insomnia    4. Gastroesophageal reflux disease without esophagitis    5. Vitamin D deficiency    6. Hyperlipidemia, mixed    7. Tobacco abuse, in remission    8. PSVT (paroxysmal supraventricular tachycardia)    9. Platelet dysfunction    Other orders  -     Rimegepant Sulfate (Nurtec) 75 MG tablet dispersible tablet; Take 1 tablet by mouth 1 (One) Time As Needed (Cute migraine) for up to 1 dose.  Dispense: 16 tablet; Refill: 11  -     eletriptan (RELPAX) 20 MG tablet; Take 1 tablet by mouth 1 (One) Time As Needed for Migraine for up to 1 dose. may repeat in 2 hours if necessary  Dispense: 15 tablet; Refill: 11      Plan, Colby Duenas, was seen today.  he was seen for GERD and will continue on PPI medication, Hyperlipidemia and will work on this with diet and exercise, Seasonal allergies and is doing well on their medication , Vitamin D deficiency and will update labs , Migraine headaches and will continue with their PRN triptan or CGRP inhibitor, and Migraine headaches and well controlled on their suppressive medication.  Relpax, Maxalt, Zomig... I will send Nurtec to pharmacy  He remains on Aimovig for migraine suppression and this is working fantastic and do note he has been to  neurologist Dr. Ridley for workup..  And has already been tried on Topamax, amitriptyline, Panalok, magnesium, co-Q10-----.  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--  Patient has 20-pack-year history quit smoking 8 years ago will order low-dose CT of chest--- can call centralized scheduling to set up  Continue Ambien CR for insomnia working well  Continue metoprolol tartrate for heart rate control working well   Due for screening colonoscopy----hx polyps  Referral to dermatology for atypical nevi right lower flank area  Answers submitted by the patient for this visit:  Primary Reason for Visit (Submitted on 8/13/2024)  What is the primary reason for your visit?: Other  Other (Submitted on 8/13/2024)  Please describe your symptoms.: Prescription refill  Have you had these symptoms before?: Yes  How long have you been having these symptoms?: 1-4 days  Please list any medications you are currently taking for this condition.: Ambien cr

## 2024-10-07 ENCOUNTER — TELEPHONE (OUTPATIENT)
Dept: GASTROENTEROLOGY | Facility: CLINIC | Age: 49
End: 2024-10-07
Payer: COMMERCIAL

## 2024-10-07 NOTE — TELEPHONE ENCOUNTER
----- Message from [facility] sent at 10/6/2024  8:07 PM EDT -----  Regarding: Appointment Request  Contact: [phone]  Appointment Request From: Colby Duenas    With Provider: Luana Saldana [[facility]]    Preferred Date Range: 10/8/2024 – 10/11/2024    Preferred Times: Any Time    Reason for visit: Follow-up    Comments:  Colonoscopy

## 2024-10-08 NOTE — TELEPHONE ENCOUNTER
Mailed OA questionnaire to patient to complete for screening. Will defer for 30 days and notify referring physician if not received

## 2024-11-05 ENCOUNTER — TELEPHONE (OUTPATIENT)
Dept: GASTROENTEROLOGY | Facility: CLINIC | Age: 49
End: 2024-11-05
Payer: COMMERCIAL

## 2024-11-05 DIAGNOSIS — K63.5 POLYP OF COLON, UNSPECIFIED PART OF COLON, UNSPECIFIED TYPE: Primary | ICD-10-CM

## 2024-11-05 RX ORDER — SODIUM CHLORIDE, SODIUM LACTATE, POTASSIUM CHLORIDE, CALCIUM CHLORIDE 600; 310; 30; 20 MG/100ML; MG/100ML; MG/100ML; MG/100ML
30 INJECTION, SOLUTION INTRAVENOUS CONTINUOUS
OUTPATIENT
Start: 2024-11-05 | End: 2024-11-05

## 2024-11-05 NOTE — TELEPHONE ENCOUNTER
LAST C/S 3/1/19 IN EPIC     PERSONAL HX OF POLYPS    NO FAMILY HX OF POLYPS    NO FAMILY HX OF COLON CA    NO ASA OR BLOOD THINNERS              LIST OF  MEDICATIONS  MECLIZINE  DOXYCYCLINE  PSEUDOEPHEDRINE GUAIFENESIN  NURTEC  ELETRIPTAN  ZOLPIDEM  AMIOVIG  METOPROLOL  MELATONIN  FIBER COMPLETE  PANTOPRAZOLE  MONTELUKAST  PEPPERMINT OIL  PROBIOTIC  FLUTICASONE        OA QUESTIONNAIRE SCANNED IN MEDIA

## 2024-11-06 ENCOUNTER — TELEPHONE (OUTPATIENT)
Dept: GASTROENTEROLOGY | Facility: CLINIC | Age: 49
End: 2024-11-06
Payer: COMMERCIAL

## 2024-11-06 PROBLEM — K63.5 POLYP OF COLON: Status: ACTIVE | Noted: 2024-11-05

## 2024-11-06 NOTE — TELEPHONE ENCOUNTER
KAREN Jiang for COLONOSCOPY on 1/21/25  arrive at 11:30  . Sent prep instructions to pt my chart....miralax

## 2024-11-28 RX ORDER — MONTELUKAST SODIUM 10 MG/1
TABLET ORAL
Qty: 90 TABLET | Refills: 3 | Status: SHIPPED | OUTPATIENT
Start: 2024-11-28

## 2024-12-08 DIAGNOSIS — F51.01 IDIOPATHIC INSOMNIA: ICD-10-CM

## 2024-12-08 RX ORDER — ZOLPIDEM TARTRATE 12.5 MG/1
12.5 TABLET, FILM COATED, EXTENDED RELEASE ORAL NIGHTLY PRN
Qty: 30 TABLET | Refills: 0 | Status: SHIPPED | OUTPATIENT
Start: 2024-12-08

## 2024-12-26 ENCOUNTER — OFFICE VISIT (OUTPATIENT)
Dept: FAMILY MEDICINE CLINIC | Facility: CLINIC | Age: 49
End: 2024-12-26
Payer: COMMERCIAL

## 2024-12-26 VITALS
OXYGEN SATURATION: 97 % | HEART RATE: 61 BPM | DIASTOLIC BLOOD PRESSURE: 62 MMHG | SYSTOLIC BLOOD PRESSURE: 100 MMHG | BODY MASS INDEX: 26.22 KG/M2 | HEIGHT: 68 IN | WEIGHT: 173 LBS | TEMPERATURE: 97.7 F | RESPIRATION RATE: 16 BRPM

## 2024-12-26 DIAGNOSIS — F51.01 IDIOPATHIC INSOMNIA: ICD-10-CM

## 2024-12-26 DIAGNOSIS — G43.109 MIGRAINE WITH AURA AND WITHOUT STATUS MIGRAINOSUS, NOT INTRACTABLE: Primary | ICD-10-CM

## 2024-12-26 PROCEDURE — 99213 OFFICE O/P EST LOW 20 MIN: CPT | Performed by: PHYSICIAN ASSISTANT

## 2024-12-26 RX ORDER — ZOLPIDEM TARTRATE 12.5 MG/1
12.5 TABLET, FILM COATED, EXTENDED RELEASE ORAL NIGHTLY PRN
Qty: 30 TABLET | Refills: 2 | Status: SHIPPED | OUTPATIENT
Start: 2024-12-26

## 2024-12-26 NOTE — PATIENT INSTRUCTIONS
Insomnia  Insomnia is a sleep disorder that makes it difficult to fall asleep or stay asleep. Insomnia can cause fatigue, low energy, difficulty concentrating, mood swings, and poor performance at work or school.  There are three different ways to classify insomnia:  Difficulty falling asleep.  Difficulty staying asleep.  Waking up too early in the morning.  Any type of insomnia can be long-term (chronic) or short-term (acute). Both are common. Short-term insomnia usually lasts for 3 months or less. Chronic insomnia occurs at least three times a week for longer than 3 months.  What are the causes?  Insomnia may be caused by another condition, situation, or substance, such as:  Having certain mental health conditions, such as anxiety and depression.  Using caffeine, alcohol, tobacco, or drugs.  Having gastrointestinal conditions, such as gastroesophageal reflux disease (GERD).  Having certain medical conditions. These include:  Asthma.  Alzheimer's disease.  Stroke.  Chronic pain.  An overactive thyroid gland (hyperthyroidism).  Other sleep disorders, such as restless legs syndrome and sleep apnea.  Menopause.  Sometimes, the cause of insomnia may not be known.  What increases the risk?  Risk factors for insomnia include:  Gender. Females are affected more often than males.  Age. Insomnia is more common as people get older.  Stress and certain medical and mental health conditions.  Lack of exercise.  Having an irregular work schedule. This may include working night shifts and traveling between different time zones.  What are the signs or symptoms?  If you have insomnia, the main symptom is having trouble falling asleep or having trouble staying asleep. This may lead to other symptoms, such as:  Feeling tired or having low energy.  Feeling nervous about going to sleep.  Not feeling rested in the morning.  Having trouble concentrating.  Feeling irritable, anxious, or depressed.  How is this diagnosed?  This condition  may be diagnosed based on:  Your symptoms and medical history. Your health care provider may ask about:  Your sleep habits.  Any medical conditions you have.  Your mental health.  A physical exam.  How is this treated?  Treatment for insomnia depends on the cause. Treatment may focus on treating an underlying condition that is causing the insomnia. Treatment may also include:  Medicines to help you sleep.  Counseling or therapy.  Lifestyle adjustments to help you sleep better.  Follow these instructions at home:  Eating and drinking    Limit or avoid alcohol, caffeinated beverages, and products that contain nicotine and tobacco, especially close to bedtime. These can disrupt your sleep.  Do not eat a large meal or eat spicy foods right before bedtime. This can lead to digestive discomfort that can make it hard for you to sleep.  Sleep habits    Keep a sleep diary to help you and your health care provider figure out what could be causing your insomnia. Write down:  When you sleep.  When you wake up during the night.  How well you sleep and how rested you feel the next day.  Any side effects of medicines you are taking.  What you eat and drink.  Make your bedroom a dark, comfortable place where it is easy to fall asleep.  Put up shades or blackout curtains to block light from outside.  Use a white noise machine to block noise.  Keep the temperature cool.  Limit screen use before bedtime. This includes:  Not watching TV.  Not using your smartphone, tablet, or computer.  Stick to a routine that includes going to bed and waking up at the same times every day and night. This can help you fall asleep faster. Consider making a quiet activity, such as reading, part of your nighttime routine.  Try to avoid taking naps during the day so that you sleep better at night.  Get out of bed if you are still awake after 15 minutes of trying to sleep. Keep the lights down, but try reading or doing a quiet activity. When you feel  sleepy, go back to bed.  General instructions  Take over-the-counter and prescription medicines only as told by your health care provider.  Exercise regularly as told by your health care provider. However, avoid exercising in the hours right before bedtime.  Use relaxation techniques to manage stress. Ask your health care provider to suggest some techniques that may work well for you. These may include:  Breathing exercises.  Routines to release muscle tension.  Visualizing peaceful scenes.  Make sure that you drive carefully. Do not drive if you feel very sleepy.  Keep all follow-up visits. This is important.  Contact a health care provider if:  You are tired throughout the day.  You have trouble in your daily routine due to sleepiness.  You continue to have sleep problems, or your sleep problems get worse.  Get help right away if:  You have thoughts about hurting yourself or someone else.  Get help right away if you feel like you may hurt yourself or others, or have thoughts about taking your own life. Go to your nearest emergency room or:  Call 911.  Call the National Suicide Prevention Lifeline at 1-278.566.1439 or 414. This is open 24 hours a day.  Text the Crisis Text Line at 107309.  Summary  Insomnia is a sleep disorder that makes it difficult to fall asleep or stay asleep.  Insomnia can be long-term (chronic) or short-term (acute).  Treatment for insomnia depends on the cause. Treatment may focus on treating an underlying condition that is causing the insomnia.  Keep a sleep diary to help you and your health care provider figure out what could be causing your insomnia.  This information is not intended to replace advice given to you by your health care provider. Make sure you discuss any questions you have with your health care provider.  Document Revised: 11/28/2022 Document Reviewed: 11/28/2022  Elsevier Patient Education © 2024 [employer]

## 2024-12-26 NOTE — PROGRESS NOTES
Subjective   Colby Duenas is a 49 y.o. male.     History of Present Illness  The patient presents for a follow-up visit and medication refills.    He has a history of migraines with auras, previously managed by a neurologist. He is currently on Aimovig, which effectively suppresses his migraines, reducing their frequency to approximately once a month. For acute migraine episodes, he uses Relpax, although its efficacy is only moderate. Nurtec has been beneficial, but he encountered difficulties in obtaining it due to insurance issues. He has also tried Ubrelvy, but it was not included in his formulary. Previous treatments have included Topamax, amitriptyline, magnesium, and CoQ10.    He is on metoprolol for heart rate control, which works well. He has sinus tachycardia and saw Dr. Moreno, a cardiologist who specializes in rhythms, who did a loop recorder on him. His last stress echo was on 12/23/2016 and it was normal.    He quit smoking about 8 years ago, with a 20-pack-year history. He is on a CPAP and sees Dr. Singh, but has not seen her in a while. He rarely uses his CPAP and feels like he is doing okay.    He is on Ambien CR for sleep, which is still effective. He did the required drug screen that [facility] [place] requires back on 04/26/2023.    He is supposed to get his colonoscopy done later this month, but he is going to reschedule until 02/2025 because he will be on the road for about a month. He will schedule the low-dose CT of the chest when he gets back from [place]. He has not had an influenza vaccine because it makes him sick. He is handling stress better than he used to. He works in a high-tech, high-paced field. He does not drink or get angry. He maintains an active lifestyle, walking 10,000 to 30,000 steps a day and climbing ladders, although he acknowledges that his work environment is not the healthiest.    Supplemental Information  He sees [facility] [place] [facility] for irritable bowel syndrome.    SOCIAL  HISTORY  The patient quit smoking 8 years ago with a 20-pack-year history. He does not drink alcohol.    MEDICATIONS  Current: Protonix, vitamin D supplement, Aimovig, eletriptan, metoprolol, Ambien CR  Past: Topamax, amitriptyline, magnesium, CoQ10     Colby Duenas 49 y.o. male presents for follow up of insomnia with onset of symptoms years ago. Patient describes symptoms as early morning awakening, frequent night time awakening, difficulty falling asleep, and non-restful sleep. Patient has found no relief with Trazodone, OTC Benadryl, Tylenol PM OTC, Melatonin, avoiding exercise prior to bedtime, going to bed at the same time every night and getting up at the same time, and avoiding naps. Associated symptoms include: fatigue if unable to take Rx . Patient denies history of addiction Symptoms have been well-controlled with taking current prescription medication.  The patient has failed multiple OTC medications for insomnia.  They are well controlled on current Rx and will continue to try to take Rx PRN.  He will use the lowest effective dose.  The patient has read and signed the [facility] Controlled Substance Contract.  I will continue to see patient for regular follow up appointments and be prescribed the lowest effective dose.  NELY has been reviewed by me and is updated every 3 months. The patient is aware of the potential for addiction and dependence.  He denies that Ambien (Zolpidem) causes excessive daytime drowsiness and sleep walking.  Patient voices understanding to take Ambien (Zolpidem) and go straight to bed. Patient must be able to sleep 7 hours or more when taking this and no alcohol.  Patient will hold Rx and contact me if they experience any impaired mental alertness the next day.      The following portions of the patient's history were reviewed and updated as appropriate: allergies, current medications, past family history, past medical history, past social history, past surgical  history, and problem list.    Review of Systems   Constitutional:  Negative for chills, diaphoresis, fatigue and fever.   HENT:  Negative for congestion, sore throat and swollen glands.    Respiratory:  Negative for cough.    Cardiovascular:  Negative for chest pain.   Gastrointestinal:  Negative for abdominal pain, nausea and vomiting.   Genitourinary:  Negative for dysuria.   Musculoskeletal:  Positive for myalgias. Negative for neck pain.   Skin:  Negative for rash.   Neurological:  Negative for weakness and numbness.   Psychiatric/Behavioral:  Positive for sleep disturbance.        Objective   Physical Exam  Vitals and nursing note reviewed.   Constitutional:       General: He is not in acute distress.     Appearance: Normal appearance. He is well-developed. He is not diaphoretic.   HENT:      Head: Normocephalic.      Right Ear: External ear normal.      Left Ear: External ear normal.   Eyes:      General: No scleral icterus.     Conjunctiva/sclera: Conjunctivae normal.      Pupils: Pupils are equal, round, and reactive to light.   Cardiovascular:      Rate and Rhythm: Normal rate and regular rhythm.      Heart sounds: Normal heart sounds. No murmur heard.  Pulmonary:      Effort: Pulmonary effort is normal.      Breath sounds: Normal breath sounds. No rales.   Musculoskeletal:         General: Normal range of motion.      Cervical back: Normal range of motion and neck supple.      Right lower leg: No edema.      Left lower leg: No edema.   Skin:     General: Skin is warm and dry.      Findings: No rash.   Neurological:      Mental Status: He is alert and oriented to person, place, and time.   Psychiatric:         Mood and Affect: Mood normal. Affect is not inappropriate.         Behavior: Behavior normal.         Thought Content: Thought content normal.         Judgment: Judgment normal.         Physical Exam  Lungs are clear.  There is no swelling in the legs.    Vital Signs  BMI is 26.        Results  Laboratory Studies  Labs from 08/14/2024 showed mild elevation of cholesterol, ratio score of cholesterol was 1.9%. Vitamin levels were at goal. Thyroid labs were in range. Blood count normal and the citrate and platelet count was normal. Kidney and liver function is normal. PSA was 0.2.       Assessment & Plan   Diagnoses and all orders for this visit:    1. Migraine with aura and without status migrainosus, not intractable (Primary)    2. Idiopathic insomnia  -     zolpidem CR (Ambien CR) 12.5 MG CR tablet; Take 1 tablet by mouth At Night As Needed for Sleep. For insomnia  Dispense: 30 tablet; Refill: 2        Assessment & Plan  1. Migraine.  He experiences migraines with auras and is currently on Aimovig, which has reduced the frequency of migraines to about once a month. He has tried Ubrelvy, but it was not formulary, and Nurtec was not covered by insurance. He has previously been on Topamax, amitriptyline, magnesium, and CoQ10.    2. Sinus Tachycardia.  He is on metoprolol for heart rate control, which is effective. He saw Dr. Moreno, a cardiologist specializing in rhythms, who performed a loop recorder. His last stress echo on 12/23/2016 was normal.    3. Sleep Apnea.  He is currently not using his CPAP machine but reports feeling okay.    4. Medication Management.  He is on Ambien CR for sleep, which is effective. He completed the required drug screen on 04/26/2023.    5. Health Maintenance.  He had a mild elevation in cholesterol, but the ratio score was 1.9%, which is acceptable. Vitamin levels, thyroid labs, blood count, kidney, and liver function were all normal. PSA was 0.2, which is excellent. He is eligible for a low-dose CT of the chest due to a 20-pack-year smoking history and quitting 8 years ago. He is also eligible for the shingles vaccine now that he is 49 years old. He has not had an influenza vaccine because it makes him sick. He will schedule the low-dose CT after returning from  [place]. He will check insurance coverage before getting the shingles vaccine.                Patient or patient representative verbalized consent for the use of Ambient Listening during the visit with  Mary Lopez PA-C for chart documentation. 12/26/2024  08:09 EST  Answers submitted by the patient for this visit:  Primary Reason for Visit (Submitted on 12/25/2024)  What is the primary reason for your visit?: Problem Not Listed  Problem not listed (Submitted on 12/25/2024)  Chief Complaint: Other medical problem  Reason for appointment: Prescription refill  anorexia: No  joint pain: No  change in stool: No  headaches: No  joint swelling: Yes  vertigo: No  visual change: No  Onset: at an unknown time  Chronicity: recurrent  Frequency: intermittently

## 2025-01-14 ENCOUNTER — TELEPHONE (OUTPATIENT)
Dept: GASTROENTEROLOGY | Facility: CLINIC | Age: 50
End: 2025-01-14

## 2025-01-14 NOTE — TELEPHONE ENCOUNTER
"Hub staff attempted to follow warm transfer process and was unsuccessful     Caller: Colby Duenas \"Ramu\"    Relationship to patient: Self    Best call back number: [phone]    Patient is needing: PATIENT CALLED OUT OF TOWN FOR WORK AND UNABLE TO HAVE PROCEDURE DONE ON 1-21-25 WITH DR IVORY          "

## 2025-04-09 ENCOUNTER — OFFICE VISIT (OUTPATIENT)
Dept: FAMILY MEDICINE CLINIC | Facility: CLINIC | Age: 50
End: 2025-04-09
Payer: COMMERCIAL

## 2025-04-09 VITALS
OXYGEN SATURATION: 96 % | HEIGHT: 68 IN | RESPIRATION RATE: 16 BRPM | BODY MASS INDEX: 26.22 KG/M2 | WEIGHT: 173 LBS | HEART RATE: 71 BPM | TEMPERATURE: 97.4 F | DIASTOLIC BLOOD PRESSURE: 70 MMHG | SYSTOLIC BLOOD PRESSURE: 106 MMHG

## 2025-04-09 DIAGNOSIS — F17.201 TOBACCO ABUSE, IN REMISSION: ICD-10-CM

## 2025-04-09 DIAGNOSIS — K21.9 GASTROESOPHAGEAL REFLUX DISEASE WITHOUT ESOPHAGITIS: ICD-10-CM

## 2025-04-09 DIAGNOSIS — F51.01 IDIOPATHIC INSOMNIA: ICD-10-CM

## 2025-04-09 DIAGNOSIS — Z12.5 SCREENING PSA (PROSTATE SPECIFIC ANTIGEN): ICD-10-CM

## 2025-04-09 DIAGNOSIS — D22.9 ATYPICAL NEVI: ICD-10-CM

## 2025-04-09 DIAGNOSIS — E78.2 HYPERLIPIDEMIA, MIXED: ICD-10-CM

## 2025-04-09 DIAGNOSIS — R73.01 IMPAIRED FASTING GLUCOSE: ICD-10-CM

## 2025-04-09 DIAGNOSIS — G43.109 MIGRAINE WITH AURA AND WITHOUT STATUS MIGRAINOSUS, NOT INTRACTABLE: Primary | ICD-10-CM

## 2025-04-09 DIAGNOSIS — E55.9 VITAMIN D DEFICIENCY: ICD-10-CM

## 2025-04-09 PROCEDURE — 99214 OFFICE O/P EST MOD 30 MIN: CPT | Performed by: PHYSICIAN ASSISTANT

## 2025-04-09 RX ORDER — ZOLPIDEM TARTRATE 12.5 MG/1
12.5 TABLET, FILM COATED, EXTENDED RELEASE ORAL NIGHTLY PRN
Qty: 30 TABLET | Refills: 2 | Status: SHIPPED | OUTPATIENT
Start: 2025-04-09

## 2025-04-09 RX ORDER — METOPROLOL SUCCINATE 50 MG/1
50 TABLET, EXTENDED RELEASE ORAL DAILY
Qty: 90 TABLET | Refills: 3 | Status: SHIPPED | OUTPATIENT
Start: 2025-04-09

## 2025-04-09 RX ORDER — ERENUMAB-AOOE 140 MG/ML
1 INJECTION, SOLUTION SUBCUTANEOUS
Qty: 1 ML | Refills: 12 | Status: SHIPPED | OUTPATIENT
Start: 2025-04-09

## 2025-04-09 RX ORDER — ELETRIPTAN HYDROBROMIDE 20 MG/1
20 TABLET, FILM COATED ORAL ONCE AS NEEDED
Qty: 15 TABLET | Refills: 11 | Status: SHIPPED | OUTPATIENT
Start: 2025-04-09

## 2025-04-09 RX ORDER — PANTOPRAZOLE SODIUM 20 MG/1
20 TABLET, DELAYED RELEASE ORAL DAILY
Qty: 90 TABLET | Refills: 3 | Status: SHIPPED | OUTPATIENT
Start: 2025-04-09

## 2025-04-09 NOTE — LETTER
Controlled Substance Prescribing Agreement          I, Colby Duenas [PATIENT],  1975 [] a patient of  Mary Lopez PA-C   [PROVIDER] at [facility] [PRACTICE], have been informed that  individuals who are prescribed certain Controlled Substances including, but not limited to, narcotic pain medicines, stimulants, benzodiazepine tranquilizers, and barbiturate sedatives, can abuse those substances or may allow abuse by others, and have some risk of developing an addictive disorder or suffering a relapse of a prior addiction. Therefore, I have been informed that it is necessary to observe strict rules pertaining to their use, and I agree to follow the terms and procedures described in this Agreement as consideration for, and as a condition of, the willingness of the physician whose signature appears below to consider prescribing or to continue prescribing Controlled Substances to treat my insomnia    *Pt takes Ambien for Insomnia    1. I will inform my physician of any current or past substance abuse, or any current or past substance abuse of any immediate member of my immediate family.     2. I agree that I may be subject to a voluntary evaluation by psychologists and/or psychiatrists, possibly at my own expense, before any Controlled Substances will be prescribed to me. I agree that the need to be evaluated by psychologists and/or psychiatrists may be revisited every three (3) to six (6) months thereafter while taking the medication.     3. All Controlled Substances must come from a provider in the PROVIDER’S PRACTICE. My Controlled Substances will come from the PROVIDER whose signature appears below, or during his or her absence, by the covering provider, unless specific written authorization is obtained from the office for an exception.     4. I will obtain all Controlled Substances from the same pharmacy. Should the need arise to change pharmacies, I will inform the  PROVIDER’S office.     5. I will inform the PROVIDER’S office of any new medications or medical conditions, and of any adverse effects I experience from any of the medications that I take.     6. I will inform my other health care providers that I am taking the Controlled Substances listed above, and of the existence of this Agreement. In the event of an emergency, I will provide the foregoing information to emergency department providers.     7. I agree that my prescribing PROVIDER has permission to discuss all diagnostic and treatment details with other health care providers, pharmacists, or other professionals who provide my health care regarding my use of Controlled Substances for purposes of maintaining accountability.     8. I will not allow anyone else to have, use sell, or otherwise have access to these medications. The sharing of medications with anyone is absolutely forbidden and is against the law.         9. I understand that Controlled Substances may be hazardous or lethal to a person who is not tolerant to their effects, especially a child, and that I must keep them out of reach of such people for their own safety.     10. I understand that tampering with a written prescription is a felony and I will not change or tamper with the PROVIDER’S written prescription.     11. I am aware that attempting to obtain a Controlled Substance under false pretenses is illegal.     12. I agree not to alter my medication in any way, and I will take my medication whole, and it will not be broken, chewed, crushed, injected, or snorted.     13. I will take my medication as instructed and prescribed, and I will not exceed the maximum prescribed dose. Any change in dosage must be approved by the PROVIDER or a physician within the PRACTICE.     14. I understand that these drugs should not be stopped abruptly, as withdrawal syndromes may develop.     15. I will cooperate with unannounced urine or serum toxicology screenings  as may be requested, as well as any random pill counts of medication by the PROVIDER. Failure to comply may result in termination of the PROVIDER-patient relationship.     16. I understand that the presence of unauthorized and/or illegal substances in the screenings described in the paragraph above may prompt referral for assessment for a substance abuse disorder or termination of the PROVIDER-patient relationship.     17. I understand that medications may not be replaced if they are lost, damaged, or stolen. If any of these situations arise that cause me to request an early refill of my medication, a copy of a filed police report or a statement from me explaining the circumstances may be required before additional prescriptions are considered. If I request an early refill secondary to lost, damaged, or stolen prescriptions twice within a year, I may be discharged from the practice.     18. I understand that a prescription may be given early if the PROVIDER or the patient will be out of town when the refill is due. These prescriptions will contain instructions to the pharmacist that the prescriptions(s) may not be filled prior to the appropriate date.     19. If the responsible legal authorities have questions concerning my treatment, as may occur, for example, if I obtained medication at several pharmacies, all confidentiality is waived, and these authorities may be given full access to my full records of Controlled Substances administration.     20. I will keep my scheduled appointments in order to receive medication renewals. If I need to cancel my appointment, I will do so a minimum of twenty-four (24) hours before it is scheduled.     21. I understand that I may be asked to bring my medications in their original container to the PROVIDER’s office while I am on controlled medication.     22. Refills generally will not be given over the phone, after office hours, during the weekends, and on holidays.     23. I  understand that any medical treatment is initially a trial, with the goal of treatment being to improve the quality of life and ability to function and/or work. These parameters will be assessed periodically to determine the benefits of continued therapy, and continued prescription is contingent on whether my physician believes that the medication usage benefits me. I will comply with all treatments as outlined by the PROVIDER.     24. I have been explained the risks and potential benefits of these therapies, including, but not limited to, psychological addiction, physical dependence, withdrawal and over dosage.     25. I understand that failure to adhere to these policies and/or failure to comply with the PROVIDER’S treatment plan may result in cessation of therapy with Controlled Substance prescribing by the PROVIDER or referral for further specialty assessment, as well as possible discharge from the PRACTICE.     26. I, the undersigned patient, attest that the foregoing was discussed with me, and that I have read, fully understand, and agree to all of the above requirements and instructions. I affirm that I have the full right and power to sign and be bound by this  Agreement.         Date:  __________________________________________    Time:  __________________________________________    Patient Printed Name:  _____________________________    Patient Signature:  ________________________________           Date:  __________________________________________    Time:  __________________________________________    Provider Signature:  _______________________________

## 2025-04-09 NOTE — PROGRESS NOTES
"Subjective   Colby Duenas is a 50 y.o. male.     Hypertension  Pertinent negatives include no chest pain or neck pain.   Hyperlipidemia  Associated symptoms include myalgias. Pertinent negatives include no chest pain.   Insomnia  Symptoms include myalgias.    Pertinent negative symptoms include no abdominal pain, no chest pain, no chills, no congestion, no cough, no diaphoresis, no fatigue, no fever, no nausea, no neck pain, no numbness, no rash, no sore throat, no swollen glands, no dysuria, no vomiting and no weakness.   Heartburn  He reports no abdominal pain, no chest pain, no coughing, no nausea or no sore throat. Pertinent negatives include no fatigue.   Allergic Rhinitis  He reports no congestion, cough, fatigue, fever, rash, sore throat or swollen glands.         Since the last visit, he has overall felt well.  He has Impaired fasting glucose and will monitor labs to watch for DMII, GERD controlled on PPI Rx, Seasonal allergies and doing well on their medication , Vitamin D deficiency and will update labs for continued management, Migraine headaches and responding well to PRN triptan or CGPR inhibitor, and Migraine headaches and well controlled on suppression medication.  he has been compliant with current medications have reviewed them.  The patient denies medication side effects.  Will refill medications. /70   Pulse 71   Temp 97.4 °F (36.3 °C) (Temporal)   Resp 16   Ht 172.7 cm (68\")   Wt 78.5 kg (173 lb)   SpO2 96%   BMI 26.30 kg/m² .          Still need him to see derm-----    Will be having colonoscopy---DR NASSAR---f/u polyps    Continue metoprolol tartrate for heart rate control working well   Results for orders placed or performed in visit on 08/14/24   Platelet Ct On Citrated Bld    Collection Time: 08/14/24  3:42 PM    Specimen: Blood   Result Value Ref Range    Platelets (Citrated Blood) 243 140 - 450 10*3/mm3   PSA Screen    Collection Time: 08/14/24  3:42 PM    Specimen: Blood "   Result Value Ref Range    PSA 0.210 0.000 - 4.000 ng/mL   Magnesium    Collection Time: 08/14/24  3:42 PM    Specimen: Blood   Result Value Ref Range    Magnesium 1.9 1.6 - 2.6 mg/dL   T4, Free    Collection Time: 08/14/24  3:42 PM    Specimen: Blood   Result Value Ref Range    Free T4 1.20 0.92 - 1.68 ng/dL   Vitamin D,25-Hydroxy    Collection Time: 08/14/24  3:42 PM    Specimen: Blood   Result Value Ref Range    25 Hydroxy, Vitamin D 31.4 30.0 - 100.0 ng/ml   Folate    Collection Time: 08/14/24  3:42 PM    Specimen: Blood   Result Value Ref Range    Folate 12.10 4.78 - 24.20 ng/mL   Vitamin B12    Collection Time: 08/14/24  3:42 PM    Specimen: Blood   Result Value Ref Range    Vitamin B-12 505 211 - 946 pg/mL   TSH    Collection Time: 08/14/24  3:42 PM    Specimen: Blood   Result Value Ref Range    TSH 1.790 0.270 - 4.200 uIU/mL   Lipid Panel    Collection Time: 08/14/24  3:42 PM    Specimen: Blood   Result Value Ref Range    Total Cholesterol 172 0 - 200 mg/dL    Triglycerides 61 0 - 150 mg/dL    HDL Cholesterol 55 40 - 60 mg/dL    LDL Cholesterol  105 (H) 0 - 100 mg/dL    VLDL Cholesterol 12 5 - 40 mg/dL    LDL/HDL Ratio 1.91    Comprehensive Metabolic Panel    Collection Time: 08/14/24  3:42 PM    Specimen: Blood   Result Value Ref Range    Glucose 81 65 - 99 mg/dL    BUN 15 6 - 20 mg/dL    Creatinine 1.03 0.76 - 1.27 mg/dL    Sodium 138 136 - 145 mmol/L    Potassium 4.3 3.5 - 5.2 mmol/L    Chloride 103 98 - 107 mmol/L    CO2 23.3 22.0 - 29.0 mmol/L    Calcium 8.9 8.6 - 10.5 mg/dL    Total Protein 7.2 6.0 - 8.5 g/dL    Albumin 4.2 3.5 - 5.2 g/dL    ALT (SGPT) 14 1 - 41 U/L    AST (SGOT) 16 1 - 40 U/L    Alkaline Phosphatase 68 39 - 117 U/L    Total Bilirubin 0.4 0.0 - 1.2 mg/dL    Globulin 3.0 gm/dL    A/G Ratio 1.4 g/dL    BUN/Creatinine Ratio 14.6 7.0 - 25.0    Anion Gap 11.7 5.0 - 15.0 mmol/L    eGFR 88.5 >60.0 mL/min/1.73     8/15/2024  7:06 AM EDT       Mild elevation of LDL cholesterol.  Your  cholesterol rescore is 1.9% and goal is to be less than 7.5%---good  Vitamin levels are at goal.  Thyroid labs are in range.  Your blood count is in desirable range with the citrated platelet count normal.  Kidney and liver functions in range.  PSA negative.  The 10-year ASCVD risk score (Sathya PEÑA, et al., 2019) is: 1.9%           Hx mild obst sleep apnea---not tolerating Cpap      Note that Ubrelvy was not formulary and patient's already failed Imitrex, Relpax, Maxalt, Zomig... I will send Nurtec to pharmacy  He remains on Aimovig for migraine suppression and this is working fantastic and do note he has been to neurologist Dr. Cordova for workup..  And has already been tried on Topamax, amitriptyline, Panalok, magnesium, co-Q10-----.  Quit smoking 8 yrs ago-------20 pk yr hx  Continues on Toprol for heart rate control working well  Sees GI for IBS---saw JESSIE Saldana PA-C 6-13-23---on IB Guard  GERD is controlled on PPI Rx  To set up colonoscopy  Use Cpap/Bipap every night---sees DR Naranjo  Sees cardio hx PSVT  Prior notes: ---2016  On beta blocker for hx sinus tachycardia; hx syncope  Loop recorder----released from cardio-----DR Moreno   Echo stress test 12-23-16  Conclusions:   Global left ventricular wall motion and contractility are within normal   limits.   The EF 4ch was calculated at 58%.   Normal left ventricular diastolic filling is observed.   Definity was used to optimize study.   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 Cordova for migraines and hx concussions.  Last visit he reported that he was not taking his prevention medicine due to insurance.  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 male presents for follow up of insomnia with onset of symptoms years ago. Patient describes symptoms as early morning awakening, frequent night time awakening, difficulty falling asleep, and non-restful sleep. Patient has found no relief with Trazodone, OTC Benadryl, Tylenol PM OTC, Advil PM OTC, Melatonin, avoiding exercise prior to bedtime, going to bed at the same time every night and getting up at the same time, and avoiding naps. Associated symptoms include: fatigue if unable to take Rx . Patient denies history of addiction Symptoms have been well-controlled with taking current prescription medication.  The patient has failed multiple OTC medications for insomnia.  They are well controlled on current Rx and will continue to try to take Rx PRN.  He will use the lowest effective dose.  The patient has read and signed the [facility] Controlled Substance Contract.  I will continue to see patient for regular follow up appointments and be prescribed the lowest effective dose.  NELY has been reviewed by me and is updated every 3 months. The patient is aware of the potential for addiction and dependence.  He denies that Ambien (Zolpidem) causes excessive daytime drowsiness and sleep walking.  Patient voices understanding to take Ambien (Zolpidem) and go straight to bed. Patient must be able to sleep 7 hours or more when taking this and no alcohol.  Patient will hold Rx and contact me if they experience any impaired mental alertness the next day.      The following portions of the patient's history were reviewed and updated as appropriate: allergies, current medications, past family history, past medical history, past social history, past surgical history, and problem list.    Review of Systems   Constitutional:  Negative for chills, diaphoresis,  fatigue and fever.   HENT:  Negative for congestion, sore throat and swollen glands.    Respiratory:  Negative for cough.    Cardiovascular:  Negative for chest pain.   Gastrointestinal:  Negative for abdominal pain, nausea and vomiting.   Genitourinary:  Negative for dysuria.   Musculoskeletal:  Positive for myalgias. Negative for neck pain.   Skin:  Negative for rash.   Neurological:  Negative for weakness and numbness.   Psychiatric/Behavioral:  Positive for sleep disturbance. The patient has insomnia.        Objective   Physical Exam  Vitals and nursing note reviewed.   Constitutional:       General: He is not in acute distress.     Appearance: Normal appearance. He is well-developed. He is not diaphoretic.   HENT:      Head: Normocephalic.      Right Ear: External ear normal.      Left Ear: External ear normal.   Eyes:      Conjunctiva/sclera: Conjunctivae normal.      Pupils: Pupils are equal, round, and reactive to light.   Cardiovascular:      Rate and Rhythm: Normal rate and regular rhythm.      Heart sounds: Normal heart sounds. No murmur heard.  Pulmonary:      Effort: Pulmonary effort is normal.      Breath sounds: Normal breath sounds. No rales.   Musculoskeletal:         General: Normal range of motion.      Cervical back: Normal range of motion and neck supple.      Right lower leg: No edema.      Left lower leg: No edema.   Skin:     General: Skin is warm and dry.      Findings: No rash.   Neurological:      Mental Status: He is alert and oriented to person, place, and time.   Psychiatric:         Mood and Affect: Mood normal. Affect is not inappropriate.         Behavior: Behavior normal.         Thought Content: Thought content normal.         Judgment: Judgment normal.           Assessment & Plan   Diagnoses and all orders for this visit:    1. Primary insomnia (Primary)    2. Migraine with aura and without status migrainosus, not intractable    3. Gastroesophageal reflux disease without  esophagitis    4. Vitamin D deficiency    5. Hyperlipidemia, mixed    6. Tobacco abuse, in remission    7. Impaired fasting glucose    8. Atypical nevi        I still need him to see dermatology for the atypical nevi and we will have them call and reset the appointment  We will make another order for low-dose CT of the chest to screen for lung cancer 20-pack-year history quit 9 years ago  Will reschedule his colonoscopy history of polyps seeing [facility] Dr. NASSAR  Will continue to order citrated platelet count----last lab normal  Noting that I documented failures of his acute and chronic migraine medications and how medically necessary it is for him to stay on Aimovig injections along with Relpax for acute episodes as needed  Continue Toprol for heart rate control working fantastic and tolerates well  Plan, Colby Duenas, was seen today.  he was seen for Imparied fasting glucose and plan follow up labs, diet, and exercise, GERD and will continue on PPI medication, Seasonal allergies and is doing well on their medication , Vitamin D deficiency and will update labs , Migraine headaches and will continue with their PRN triptan or CGRP inhibitor, and Migraine headaches and well controlled on their suppressive medication.

## 2025-05-02 ENCOUNTER — PRIOR AUTHORIZATION (OUTPATIENT)
Dept: FAMILY MEDICINE CLINIC | Facility: CLINIC | Age: 50
End: 2025-05-02
Payer: COMMERCIAL

## 2025-05-02 NOTE — TELEPHONE ENCOUNTER
Erenumab-aooe (Aimovig) 140 MG/ML auto-injector     Called [facility] today to initiate PA, they will fax over form to fill out today to complete PA.    [facility]: [phone]  
normal (ped)...

## 2025-06-24 ENCOUNTER — LAB (OUTPATIENT)
Dept: LAB | Facility: HOSPITAL | Age: 50
End: 2025-06-24
Payer: COMMERCIAL

## 2025-06-24 DIAGNOSIS — G43.109 MIGRAINE WITH AURA AND WITHOUT STATUS MIGRAINOSUS, NOT INTRACTABLE: ICD-10-CM

## 2025-06-24 DIAGNOSIS — K21.9 GASTROESOPHAGEAL REFLUX DISEASE WITHOUT ESOPHAGITIS: ICD-10-CM

## 2025-06-24 DIAGNOSIS — Z12.5 SCREENING PSA (PROSTATE SPECIFIC ANTIGEN): ICD-10-CM

## 2025-06-24 DIAGNOSIS — R73.01 IMPAIRED FASTING GLUCOSE: ICD-10-CM

## 2025-06-24 DIAGNOSIS — F17.201 TOBACCO ABUSE, IN REMISSION: ICD-10-CM

## 2025-06-24 DIAGNOSIS — E78.2 HYPERLIPIDEMIA, MIXED: ICD-10-CM

## 2025-06-24 DIAGNOSIS — F51.01 IDIOPATHIC INSOMNIA: ICD-10-CM

## 2025-06-24 DIAGNOSIS — D22.9 ATYPICAL NEVI: ICD-10-CM

## 2025-06-24 DIAGNOSIS — E55.9 VITAMIN D DEFICIENCY: ICD-10-CM

## 2025-06-24 LAB
25(OH)D3 SERPL-MCNC: 26.1 NG/ML (ref 30–100)
ALBUMIN SERPL-MCNC: 4.2 G/DL (ref 3.5–5.2)
ALBUMIN/GLOB SERPL: 1.4 G/DL
ALP SERPL-CCNC: 69 U/L (ref 39–117)
ALT SERPL W P-5'-P-CCNC: 13 U/L (ref 1–41)
ANION GAP SERPL CALCULATED.3IONS-SCNC: 14 MMOL/L (ref 5–15)
AST SERPL-CCNC: 24 U/L (ref 1–40)
BACTERIA UR QL AUTO: NORMAL /HPF
BASOPHILS # BLD AUTO: 0.05 10*3/MM3 (ref 0–0.2)
BASOPHILS NFR BLD AUTO: 0.6 % (ref 0–1.5)
BILIRUB SERPL-MCNC: 0.4 MG/DL (ref 0–1.2)
BILIRUB UR QL STRIP: NEGATIVE
BUN SERPL-MCNC: 11 MG/DL (ref 6–20)
BUN/CREAT SERPL: 10.7 (ref 7–25)
CALCIUM SPEC-SCNC: 8.9 MG/DL (ref 8.6–10.5)
CHLORIDE SERPL-SCNC: 99 MMOL/L (ref 98–107)
CHOLEST SERPL-MCNC: 144 MG/DL (ref 0–200)
CLARITY UR: CLEAR
CO2 SERPL-SCNC: 23 MMOL/L (ref 22–29)
COLOR UR: YELLOW
CREAT SERPL-MCNC: 1.03 MG/DL (ref 0.76–1.27)
DEPRECATED RDW RBC AUTO: 40.8 FL (ref 37–54)
EGFRCR SERPLBLD CKD-EPI 2021: 87.9 ML/MIN/1.73
EOSINOPHIL # BLD AUTO: 0.3 10*3/MM3 (ref 0–0.4)
EOSINOPHIL NFR BLD AUTO: 3.7 % (ref 0.3–6.2)
ERYTHROCYTE [DISTWIDTH] IN BLOOD BY AUTOMATED COUNT: 12.6 % (ref 12.3–15.4)
FOLATE SERPL-MCNC: 11.3 NG/ML (ref 4.78–24.2)
GLOBULIN UR ELPH-MCNC: 2.9 GM/DL
GLUCOSE SERPL-MCNC: 109 MG/DL (ref 65–99)
GLUCOSE UR STRIP-MCNC: NEGATIVE MG/DL
HCT VFR BLD AUTO: 35.5 % (ref 37.5–51)
HDLC SERPL-MCNC: 43 MG/DL (ref 40–60)
HGB BLD-MCNC: 11.8 G/DL (ref 13–17.7)
HGB UR QL STRIP.AUTO: NEGATIVE
HYALINE CASTS UR QL AUTO: NORMAL /LPF
IMM GRANULOCYTES # BLD AUTO: 0.02 10*3/MM3 (ref 0–0.05)
IMM GRANULOCYTES NFR BLD AUTO: 0.2 % (ref 0–0.5)
KETONES UR QL STRIP: NEGATIVE
LDLC SERPL CALC-MCNC: 74 MG/DL (ref 0–100)
LDLC/HDLC SERPL: 1.63 {RATIO}
LEUKOCYTE ESTERASE UR QL STRIP.AUTO: NEGATIVE
LYMPHOCYTES # BLD AUTO: 2.26 10*3/MM3 (ref 0.7–3.1)
LYMPHOCYTES NFR BLD AUTO: 28.2 % (ref 19.6–45.3)
MAGNESIUM SERPL-MCNC: 1.9 MG/DL (ref 1.6–2.6)
MCH RBC QN AUTO: 29.8 PG (ref 26.6–33)
MCHC RBC AUTO-ENTMCNC: 33.2 G/DL (ref 31.5–35.7)
MCV RBC AUTO: 89.6 FL (ref 79–97)
MONOCYTES # BLD AUTO: 0.66 10*3/MM3 (ref 0.1–0.9)
MONOCYTES NFR BLD AUTO: 8.2 % (ref 5–12)
NEUTROPHILS NFR BLD AUTO: 4.72 10*3/MM3 (ref 1.7–7)
NEUTROPHILS NFR BLD AUTO: 59.1 % (ref 42.7–76)
NITRITE UR QL STRIP: NEGATIVE
NRBC BLD AUTO-RTO: 0 /100 WBC (ref 0–0.2)
PH UR STRIP.AUTO: 6.5 [PH] (ref 5–8)
PLATELET # BLD AUTO: 118 10*3/MM3 (ref 140–450)
PLATELETS (CITRATED) BY AUTOMATED COUNT: 246 10*3/MM3 (ref 140–450)
PMV BLD AUTO: 12.3 FL (ref 6–12)
POTASSIUM SERPL-SCNC: 4.5 MMOL/L (ref 3.5–5.2)
PROT SERPL-MCNC: 7.1 G/DL (ref 6–8.5)
PROT UR QL STRIP: NEGATIVE
PSA SERPL-MCNC: 0.22 NG/ML (ref 0–4)
RBC # BLD AUTO: 3.96 10*6/MM3 (ref 4.14–5.8)
RBC # UR STRIP: NORMAL /HPF
REF LAB TEST METHOD: NORMAL
SODIUM SERPL-SCNC: 136 MMOL/L (ref 136–145)
SP GR UR STRIP: 1.01 (ref 1–1.03)
SQUAMOUS #/AREA URNS HPF: NORMAL /HPF
T4 FREE SERPL-MCNC: 1.08 NG/DL (ref 0.92–1.68)
TRIGL SERPL-MCNC: 155 MG/DL (ref 0–150)
TSH SERPL DL<=0.05 MIU/L-ACNC: 2.38 UIU/ML (ref 0.27–4.2)
UROBILINOGEN UR QL STRIP: NORMAL
VIT B12 BLD-MCNC: 522 PG/ML (ref 211–946)
VLDLC SERPL-MCNC: 27 MG/DL (ref 5–40)
WBC # UR STRIP: NORMAL /HPF
WBC NRBC COR # BLD AUTO: 8.01 10*3/MM3 (ref 3.4–10.8)

## 2025-06-24 PROCEDURE — 82306 VITAMIN D 25 HYDROXY: CPT

## 2025-06-24 PROCEDURE — G0103 PSA SCREENING: HCPCS

## 2025-06-24 PROCEDURE — 84439 ASSAY OF FREE THYROXINE: CPT

## 2025-06-24 PROCEDURE — 82728 ASSAY OF FERRITIN: CPT | Performed by: PHYSICIAN ASSISTANT

## 2025-06-24 PROCEDURE — 84466 ASSAY OF TRANSFERRIN: CPT | Performed by: PHYSICIAN ASSISTANT

## 2025-06-24 PROCEDURE — 83735 ASSAY OF MAGNESIUM: CPT

## 2025-06-24 PROCEDURE — 80050 GENERAL HEALTH PANEL: CPT

## 2025-06-24 PROCEDURE — 83540 ASSAY OF IRON: CPT | Performed by: PHYSICIAN ASSISTANT

## 2025-06-24 PROCEDURE — 82607 VITAMIN B-12: CPT

## 2025-06-24 PROCEDURE — 80061 LIPID PANEL: CPT

## 2025-06-24 PROCEDURE — G0480 DRUG TEST DEF 1-7 CLASSES: HCPCS

## 2025-06-24 PROCEDURE — 80307 DRUG TEST PRSMV CHEM ANLYZR: CPT

## 2025-06-24 PROCEDURE — 36415 COLL VENOUS BLD VENIPUNCTURE: CPT

## 2025-06-24 PROCEDURE — 82746 ASSAY OF FOLIC ACID SERUM: CPT

## 2025-06-24 PROCEDURE — 81001 URINALYSIS AUTO W/SCOPE: CPT

## 2025-06-24 PROCEDURE — 85049 AUTOMATED PLATELET COUNT: CPT

## 2025-06-25 ENCOUNTER — RESULTS FOLLOW-UP (OUTPATIENT)
Dept: LAB | Facility: HOSPITAL | Age: 50
End: 2025-06-25
Payer: COMMERCIAL

## 2025-06-25 DIAGNOSIS — D64.9 LOW HEMOGLOBIN: Primary | ICD-10-CM

## 2025-06-25 LAB
FERRITIN SERPL-MCNC: 397 NG/ML (ref 30–400)
IRON 24H UR-MRATE: 56 MCG/DL (ref 59–158)
IRON SATN MFR SERPL: 17 % (ref 20–50)
TIBC SERPL-MCNC: 320 MCG/DL (ref 298–536)
TRANSFERRIN SERPL-MCNC: 215 MG/DL (ref 200–360)

## 2025-06-28 LAB
1OH-MIDAZOLAM UR QL SCN: NOT DETECTED NG/MG CREAT
6MAM UR QL SCN: NEGATIVE NG/ML
7AMINOCLONAZEPAM/CREAT UR: NOT DETECTED NG/MG CREAT
A-OH ALPRAZ/CREAT UR: NOT DETECTED NG/MG CREAT
A-OH-TRIAZOLAM/CREAT UR CFM: NOT DETECTED NG/MG CREAT
ACP UR QL CFM: NOT DETECTED
ALPRAZ/CREAT UR CFM: NOT DETECTED NG/MG CREAT
AMPHETAMINES UR QL SCN: NEGATIVE NG/ML
APAP UR QL SCN: NEGATIVE UG/ML
BARBITURATES UR QL SCN: NEGATIVE NG/ML
BENZODIAZ SCN METH UR: NEGATIVE
BUPRENORPHINE UR QL SCN: NEGATIVE
BUPRENORPHINE/CREAT UR: NOT DETECTED NG/MG CREAT
CANNABINOIDS UR QL SCN: NEGATIVE NG/ML
CARISOPRODOL UR QL: NEGATIVE NG/ML
CLONAZEPAM/CREAT UR CFM: NOT DETECTED NG/MG CREAT
COCAINE+BZE UR QL SCN: NEGATIVE NG/ML
CREAT UR-MCNC: 91 MG/DL
D-METHORPHAN UR-MCNC: NOT DETECTED NG/ML
D-METHORPHAN+LEVORPHANOL UR QL: NOT DETECTED
DESALKYLFLURAZ/CREAT UR: NOT DETECTED NG/MG CREAT
DIAZEPAM/CREAT UR: NOT DETECTED NG/MG CREAT
ETHANOL UR QL SCN: NEGATIVE G/DL
ETHANOL UR QL SCN: NEGATIVE NG/ML
FENTANYL CTO UR SCN-MCNC: NEGATIVE NG/ML
FENTANYL/CREAT UR: NOT DETECTED NG/MG CREAT
FLUNITRAZEPAM UR QL SCN: NOT DETECTED NG/MG CREAT
GABAPENTIN UR-MCNC: NEGATIVE UG/ML
HALLUCINOGENS UR: NEGATIVE
HYPNOTICS UR QL SCN: NORMAL
KETAMINE UR QL: NOT DETECTED
LORAZEPAM/CREAT UR: NOT DETECTED NG/MG CREAT
MEPERIDINE UR QL SCN: NEGATIVE NG/ML
METHADONE UR QL SCN: NEGATIVE NG/ML
METHADONE+METAB UR QL SCN: NEGATIVE NG/ML
MIDAZOLAM/CREAT UR CFM: NOT DETECTED NG/MG CREAT
MISCELLANEOUS, UR: NEGATIVE
NORBUPRENORPHINE/CREAT UR: NOT DETECTED NG/MG CREAT
NORDIAZEPAM/CREAT UR: NOT DETECTED NG/MG CREAT
NORFENTANYL/CREAT UR: NOT DETECTED NG/MG CREAT
NORFLUNITRAZEPAM UR-MCNC: NOT DETECTED NG/MG CREAT
NORKETAMINE UR-MCNC: NOT DETECTED UG/ML
OPIATES UR SCN-MCNC: NEGATIVE NG/ML
OXAZEPAM/CREAT UR: NOT DETECTED NG/MG CREAT
OXYCODONE CTO UR SCN-MCNC: NEGATIVE NG/ML
PCP UR QL SCN: NEGATIVE NG/ML
PRESCRIBED MEDICATIONS: NORMAL
PROPOXYPH UR QL SCN: NEGATIVE NG/ML
TAPENTADOL CTO UR SCN-MCNC: NEGATIVE NG/ML
TEMAZEPAM/CREAT UR: NOT DETECTED NG/MG CREAT
TRAMADOL UR QL SCN: NEGATIVE NG/ML
ZALEPLON UR-MCNC: NOT DETECTED NG/ML
ZOLPIDEM PHENYL-4-CARB UR QL SCN: PRESENT
ZOLPIDEM UR QL SCN: PRESENT
ZOPICLONE-N-OXIDE UR-MCNC: NOT DETECTED NG/ML

## 2025-07-01 ENCOUNTER — LAB (OUTPATIENT)
Dept: LAB | Facility: HOSPITAL | Age: 50
End: 2025-07-01
Payer: COMMERCIAL

## 2025-07-01 LAB
BASOPHILS # BLD AUTO: 0.06 10*3/MM3 (ref 0–0.2)
BASOPHILS NFR BLD AUTO: 0.7 % (ref 0–1.5)
DEPRECATED RDW RBC AUTO: 40.8 FL (ref 37–54)
EOSINOPHIL # BLD AUTO: 0.39 10*3/MM3 (ref 0–0.4)
EOSINOPHIL NFR BLD AUTO: 4.4 % (ref 0.3–6.2)
ERYTHROCYTE [DISTWIDTH] IN BLOOD BY AUTOMATED COUNT: 12.7 % (ref 12.3–15.4)
HCT VFR BLD AUTO: 34.7 % (ref 37.5–51)
HGB BLD-MCNC: 12.1 G/DL (ref 13–17.7)
IMM GRANULOCYTES # BLD AUTO: 0.04 10*3/MM3 (ref 0–0.05)
IMM GRANULOCYTES NFR BLD AUTO: 0.4 % (ref 0–0.5)
LYMPHOCYTES # BLD AUTO: 2.77 10*3/MM3 (ref 0.7–3.1)
LYMPHOCYTES NFR BLD AUTO: 31.1 % (ref 19.6–45.3)
MCH RBC QN AUTO: 31 PG (ref 26.6–33)
MCHC RBC AUTO-ENTMCNC: 34.9 G/DL (ref 31.5–35.7)
MCV RBC AUTO: 89 FL (ref 79–97)
MONOCYTES # BLD AUTO: 0.84 10*3/MM3 (ref 0.1–0.9)
MONOCYTES NFR BLD AUTO: 9.4 % (ref 5–12)
NEUTROPHILS NFR BLD AUTO: 4.81 10*3/MM3 (ref 1.7–7)
NEUTROPHILS NFR BLD AUTO: 54 % (ref 42.7–76)
NRBC BLD AUTO-RTO: 0 /100 WBC (ref 0–0.2)
PLATELET # BLD AUTO: 115 10*3/MM3 (ref 140–450)
PMV BLD AUTO: 12.4 FL (ref 6–12)
RBC # BLD AUTO: 3.9 10*6/MM3 (ref 4.14–5.8)
WBC NRBC COR # BLD AUTO: 8.91 10*3/MM3 (ref 3.4–10.8)

## 2025-07-01 PROCEDURE — 85025 COMPLETE CBC W/AUTO DIFF WBC: CPT | Performed by: PHYSICIAN ASSISTANT

## 2025-07-09 ENCOUNTER — HOSPITAL ENCOUNTER (OUTPATIENT)
Dept: CT IMAGING | Facility: HOSPITAL | Age: 50
Discharge: HOME | End: 2025-07-09
Admitting: PHYSICIAN ASSISTANT
Payer: COMMERCIAL

## 2025-07-09 DIAGNOSIS — F17.201 TOBACCO ABUSE, IN REMISSION: ICD-10-CM

## 2025-07-09 PROCEDURE — 71271 CT THORAX LUNG CANCER SCR C-: CPT

## 2025-07-14 ENCOUNTER — OFFICE VISIT (OUTPATIENT)
Dept: SURGERY | Facility: CLINIC | Age: 50
End: 2025-07-14
Payer: COMMERCIAL

## 2025-07-14 VITALS
OXYGEN SATURATION: 98 % | WEIGHT: 170 LBS | HEIGHT: 68 IN | SYSTOLIC BLOOD PRESSURE: 118 MMHG | BODY MASS INDEX: 25.76 KG/M2 | DIASTOLIC BLOOD PRESSURE: 70 MMHG | HEART RATE: 65 BPM

## 2025-07-14 DIAGNOSIS — D64.9 ANEMIA, UNSPECIFIED TYPE: Primary | ICD-10-CM

## 2025-07-14 DIAGNOSIS — F51.01 IDIOPATHIC INSOMNIA: ICD-10-CM

## 2025-07-14 PROCEDURE — 99203 OFFICE O/P NEW LOW 30 MIN: CPT | Performed by: SURGERY

## 2025-07-14 RX ORDER — ZOLPIDEM TARTRATE 12.5 MG/1
12.5 TABLET, FILM COATED, EXTENDED RELEASE ORAL NIGHTLY PRN
Qty: 30 TABLET | Refills: 0 | Status: SHIPPED | OUTPATIENT
Start: 2025-07-14

## 2025-07-14 NOTE — PROGRESS NOTES
Cc: Anemia    History of presenting illness:   This is a nice, generally healthy 50-year-old gentleman presenting for consideration of endoscopy.  He has recently been found to be mildly anemic.  He was started on iron with some small improvements but remains a bit on the anemic side.  No known rectal bleeding, hematemesis or other blood loss.  He has a history of mild peptic ulcer disease which has been managed with Protonix and no exacerbation of his symptoms.  Last had a colonoscopy done 2019, he believes EGD was done at the same time.  He has had polyps in the past but on the last scope he did not have any findings.  He has had no significant associated weight loss but he has had some weakness and easy fatigability.    Past Medical History: Migraine headaches, history of intermittent thrombocytopenia, paroxysmal supraventricular tachycardia, sleep apnea    Past Surgical History: EGD and colonoscopy I believe most recently done 2019.  History of hand surgery, he has had bilateral inguinal hernia repairs, both open, 1 around 1994 and another around 2001.  The right sided repair done in 2001 required a revision and the nature of that is not clear.    Medications: Aimovig, Relpax, meclizine as needed, metoprolol, Singulair, Protonix, zolpidem    Allergies: Oxycodone caused hives    Social History: He is a former smoker who quit in 2017    Family History: Negative for colon cancer    Review of Systems:  Constitutional: Denies fever, chills, change in weight  Neck: no swollen glands or dysphagia or odynophagia  Respiratory: negative for SOB, cough, hemoptysis or wheezing  Cardiovascular: negative for chest pain, palpitations or peripheral edema  Gastrointestinal: Denies rectal bleeding, hematemesis, change in bowel habit      Physical Exam:  BMI: 25.9  General: alert and oriented, appropriate, no acute distress  Eyes: No scleral icterus, extraocular movements are intact  Neck: Supple without lymphadenopathy or  thyromegaly, trachea is in the midline  Respiratory: There is good bilateral chest expansion, no use of accessory muscles is noted  Cardiovascular: No jugular venous distention or peripheral edema is seen  Gastrointestinal: Soft and benign without mass, no ventral hernia detected    Laboratory data: Most recent hemoglobin 12.1, mildly improved from prior which was 11.8.  Platelets slightly low at 115.  He has had multiple readings in the low 100s although he has had intermixed numbers greater than 200 as well.    Imaging data: No recent relevant data      Assessment and plan:   - Anemia  - History of colon polyps  - Acid reflux  - Options discussed with patient.  I recommended proceeding with EGD and colonoscopy.  Risks including bleeding and perforation discussed, patient is agreeable to proceed.      Vitaly Fields MD  General, Minimally Invasive and Endoscopic Surgery  [facility]    [address], [address]  [place], [place], [address]  P: [phone]  F: [phone]

## 2025-07-14 NOTE — H&P (VIEW-ONLY)
Cc: Anemia    History of presenting illness:   This is a nice, generally healthy 50-year-old gentleman presenting for consideration of endoscopy.  He has recently been found to be mildly anemic.  He was started on iron with some small improvements but remains a bit on the anemic side.  No known rectal bleeding, hematemesis or other blood loss.  He has a history of mild peptic ulcer disease which has been managed with Protonix and no exacerbation of his symptoms.  Last had a colonoscopy done 2019, he believes EGD was done at the same time.  He has had polyps in the past but on the last scope he did not have any findings.  He has had no significant associated weight loss but he has had some weakness and easy fatigability.    Past Medical History: Migraine headaches, history of intermittent thrombocytopenia, paroxysmal supraventricular tachycardia, sleep apnea    Past Surgical History: EGD and colonoscopy I believe most recently done 2019.  History of hand surgery, he has had bilateral inguinal hernia repairs, both open, 1 around 1994 and another around 2001.  The right sided repair done in 2001 required a revision and the nature of that is not clear.    Medications: Aimovig, Relpax, meclizine as needed, metoprolol, Singulair, Protonix, zolpidem    Allergies: Oxycodone caused hives    Social History: He is a former smoker who quit in 2017    Family History: Negative for colon cancer    Review of Systems:  Constitutional: Denies fever, chills, change in weight  Neck: no swollen glands or dysphagia or odynophagia  Respiratory: negative for SOB, cough, hemoptysis or wheezing  Cardiovascular: negative for chest pain, palpitations or peripheral edema  Gastrointestinal: Denies rectal bleeding, hematemesis, change in bowel habit      Physical Exam:  BMI: 25.9  General: alert and oriented, appropriate, no acute distress  Eyes: No scleral icterus, extraocular movements are intact  Neck: Supple without lymphadenopathy or  thyromegaly, trachea is in the midline  Respiratory: There is good bilateral chest expansion, no use of accessory muscles is noted  Cardiovascular: No jugular venous distention or peripheral edema is seen  Gastrointestinal: Soft and benign without mass, no ventral hernia detected    Laboratory data: Most recent hemoglobin 12.1, mildly improved from prior which was 11.8.  Platelets slightly low at 115.  He has had multiple readings in the low 100s although he has had intermixed numbers greater than 200 as well.    Imaging data: No recent relevant data      Assessment and plan:   - Anemia  - History of colon polyps  - Acid reflux  - Options discussed with patient.  I recommended proceeding with EGD and colonoscopy.  Risks including bleeding and perforation discussed, patient is agreeable to proceed.      Vitaly Fields MD  General, Minimally Invasive and Endoscopic Surgery  [facility]    [address], [address]  [place], [place], [address]  P: [phone]  F: [phone]

## 2025-07-22 ENCOUNTER — ANESTHESIA (OUTPATIENT)
Dept: GASTROENTEROLOGY | Facility: HOSPITAL | Age: 50
End: 2025-07-22
Payer: COMMERCIAL

## 2025-07-22 ENCOUNTER — ANESTHESIA EVENT (OUTPATIENT)
Dept: GASTROENTEROLOGY | Facility: HOSPITAL | Age: 50
End: 2025-07-22
Payer: COMMERCIAL

## 2025-07-22 ENCOUNTER — HOSPITAL ENCOUNTER (OUTPATIENT)
Facility: HOSPITAL | Age: 50
Setting detail: HOSPITAL OUTPATIENT SURGERY
Discharge: HOME | End: 2025-07-22
Attending: SURGERY | Admitting: SURGERY
Payer: COMMERCIAL

## 2025-07-22 VITALS
WEIGHT: 162 LBS | SYSTOLIC BLOOD PRESSURE: 115 MMHG | OXYGEN SATURATION: 100 % | BODY MASS INDEX: 24.55 KG/M2 | RESPIRATION RATE: 16 BRPM | HEIGHT: 68 IN | DIASTOLIC BLOOD PRESSURE: 80 MMHG | HEART RATE: 79 BPM

## 2025-07-22 PROCEDURE — 25010000002 LIDOCAINE 2% SOLUTION: Performed by: NURSE ANESTHETIST, CERTIFIED REGISTERED

## 2025-07-22 PROCEDURE — 25810000003 LACTATED RINGERS PER 1000 ML: Performed by: SURGERY

## 2025-07-22 PROCEDURE — 25010000002 GLYCOPYRROLATE 0.2 MG/ML SOLUTION: Performed by: NURSE ANESTHETIST, CERTIFIED REGISTERED

## 2025-07-22 PROCEDURE — 45378 DIAGNOSTIC COLONOSCOPY: CPT | Performed by: SURGERY

## 2025-07-22 PROCEDURE — 43235 EGD DIAGNOSTIC BRUSH WASH: CPT | Performed by: SURGERY

## 2025-07-22 PROCEDURE — 25010000002 PROPOFOL 10 MG/ML EMULSION: Performed by: NURSE ANESTHETIST, CERTIFIED REGISTERED

## 2025-07-22 RX ORDER — LIDOCAINE HYDROCHLORIDE 20 MG/ML
INJECTION, SOLUTION INFILTRATION; PERINEURAL AS NEEDED
Status: DISCONTINUED | OUTPATIENT
Start: 2025-07-22 | End: 2025-07-22 | Stop reason: SURG

## 2025-07-22 RX ORDER — DROPERIDOL 2.5 MG/ML
0.62 INJECTION, SOLUTION INTRAMUSCULAR; INTRAVENOUS
Status: DISCONTINUED | OUTPATIENT
Start: 2025-07-22 | End: 2025-07-22 | Stop reason: HOSPADM

## 2025-07-22 RX ORDER — FLUMAZENIL 0.1 MG/ML
0.2 INJECTION INTRAVENOUS AS NEEDED
Status: DISCONTINUED | OUTPATIENT
Start: 2025-07-22 | End: 2025-07-22 | Stop reason: HOSPADM

## 2025-07-22 RX ORDER — PROMETHAZINE HYDROCHLORIDE 25 MG/1
25 TABLET ORAL ONCE AS NEEDED
Status: DISCONTINUED | OUTPATIENT
Start: 2025-07-22 | End: 2025-07-22 | Stop reason: HOSPADM

## 2025-07-22 RX ORDER — NALOXONE HCL 0.4 MG/ML
0.2 VIAL (ML) INJECTION AS NEEDED
Status: DISCONTINUED | OUTPATIENT
Start: 2025-07-22 | End: 2025-07-22 | Stop reason: HOSPADM

## 2025-07-22 RX ORDER — PROPOFOL 10 MG/ML
VIAL (ML) INTRAVENOUS AS NEEDED
Status: DISCONTINUED | OUTPATIENT
Start: 2025-07-22 | End: 2025-07-22 | Stop reason: SURG

## 2025-07-22 RX ORDER — DIPHENHYDRAMINE HYDROCHLORIDE 50 MG/ML
12.5 INJECTION, SOLUTION INTRAMUSCULAR; INTRAVENOUS
Status: DISCONTINUED | OUTPATIENT
Start: 2025-07-22 | End: 2025-07-22 | Stop reason: HOSPADM

## 2025-07-22 RX ORDER — SODIUM CHLORIDE, SODIUM LACTATE, POTASSIUM CHLORIDE, CALCIUM CHLORIDE 600; 310; 30; 20 MG/100ML; MG/100ML; MG/100ML; MG/100ML
30 INJECTION, SOLUTION INTRAVENOUS CONTINUOUS PRN
Status: DISCONTINUED | OUTPATIENT
Start: 2025-07-22 | End: 2025-07-22 | Stop reason: HOSPADM

## 2025-07-22 RX ORDER — PROMETHAZINE HYDROCHLORIDE 25 MG/1
25 SUPPOSITORY RECTAL ONCE AS NEEDED
Status: DISCONTINUED | OUTPATIENT
Start: 2025-07-22 | End: 2025-07-22 | Stop reason: HOSPADM

## 2025-07-22 RX ORDER — GLYCOPYRROLATE 0.2 MG/ML
INJECTION INTRAMUSCULAR; INTRAVENOUS AS NEEDED
Status: DISCONTINUED | OUTPATIENT
Start: 2025-07-22 | End: 2025-07-22 | Stop reason: SURG

## 2025-07-22 RX ORDER — ONDANSETRON 2 MG/ML
4 INJECTION INTRAMUSCULAR; INTRAVENOUS ONCE AS NEEDED
Status: DISCONTINUED | OUTPATIENT
Start: 2025-07-22 | End: 2025-07-22 | Stop reason: HOSPADM

## 2025-07-22 RX ADMIN — PROPOFOL 100 MG: 10 INJECTION, EMULSION INTRAVENOUS at 14:10

## 2025-07-22 RX ADMIN — LIDOCAINE HYDROCHLORIDE 80 MG: 20 INJECTION, SOLUTION INFILTRATION; PERINEURAL at 14:07

## 2025-07-22 RX ADMIN — SODIUM CHLORIDE, POTASSIUM CHLORIDE, SODIUM LACTATE AND CALCIUM CHLORIDE: 600; 310; 30; 20 INJECTION, SOLUTION INTRAVENOUS at 14:04

## 2025-07-22 RX ADMIN — PROPOFOL 50 MG: 10 INJECTION, EMULSION INTRAVENOUS at 14:14

## 2025-07-22 RX ADMIN — PROPOFOL 100 MG: 10 INJECTION, EMULSION INTRAVENOUS at 14:07

## 2025-07-22 RX ADMIN — PROPOFOL 50 MG: 10 INJECTION, EMULSION INTRAVENOUS at 14:25

## 2025-07-22 RX ADMIN — GLYCOPYRROLATE 0.3 MG: 0.2 INJECTION INTRAMUSCULAR; INTRAVENOUS at 14:17

## 2025-07-22 RX ADMIN — PROPOFOL 50 MG: 10 INJECTION, EMULSION INTRAVENOUS at 14:19

## 2025-07-22 NOTE — ANESTHESIA POSTPROCEDURE EVALUATION
"Patient: Colby Duenas    Procedure Summary       Date: 07/22/25 Room / Location:  [facility] /  [facility]    Anesthesia Start: 1404 Anesthesia Stop: 1430    Procedures:       COLONOSCOPY into cecum and terminal ileum      ESOPHAGOGASTRODUODENOSCOPY (Esophagus) Diagnosis:       Anemia, unspecified type      (Anemia, unspecified type [D64.9])    Surgeons: Vitaly Fields MD Provider: Hugo Ambriz MD    Anesthesia Type: MAC ASA Status: 2            Anesthesia Type: MAC    Vitals  No vitals data found for the desired time range.          Post Anesthesia Care and Evaluation    Patient location during evaluation: bedside  Patient participation: complete - patient participated  Level of consciousness: sleepy but conscious  Pain score: 0  Pain management: adequate    Airway patency: patent  Anesthetic complications: No anesthetic complications    Cardiovascular status: acceptable  Respiratory status: acceptable  Hydration status: acceptable    Comments: /90 (BP Location: Left arm, Patient Position: Sitting)   Pulse 66   Resp 16   Ht 172.7 cm (68\")   Wt 73.5 kg (162 lb)   SpO2 98%   BMI 24.63 kg/m²       "

## 2025-07-22 NOTE — ANESTHESIA PREPROCEDURE EVALUATION
Anesthesia Evaluation     Patient summary reviewed and Nursing notes reviewed   NPO Solid Status: > 8 hours  NPO Liquid Status: > 4 hours           Airway   Mallampati: II  Neck ROM: full  No difficulty expected  Dental      Comment: Permanent bridge upper    Pulmonary     breath sounds clear to auscultation  (+) a smoker Former, asthma,sleep apnea on CPAP  Cardiovascular     Rhythm: regular    (+) hypertension, dysrhythmias Tachycardia, hyperlipidemia      Neuro/Psych  (+) headaches, syncope, psychiatric history PTSD  GI/Hepatic/Renal/Endo    (+) GERD    Musculoskeletal     Abdominal    Substance History      OB/GYN          Other   arthritis,                 Anesthesia Plan    ASA 2     MAC     intravenous induction     Anesthetic plan, risks, benefits, and alternatives have been provided, discussed and informed consent has been obtained with: patient.    CODE STATUS:

## 2025-07-22 NOTE — OP NOTE
Operative Note :   Vitaly Fields MD    Colby Duenas  1975    Procedure Date: 07/22/25    Pre-op Diagnosis:  Anemia    Post-op Diagnosis:  Normal upper endoscopy  Normal colonoscopy to terminal ileum    Procedure:   Esophagogastroduodenoscopy  Colonoscopy to terminal ileum    Surgeon: Vitaly Fields MD      Anesthesia: MAC    Indications:  50-year-old gentleman recently diagnosed with mild anemia.  He has been around 5+ years since his last scopes which she believes were normal at that time.    Findings:   Unremarkable upper endoscopy with no evidence for bleeding identified  Normal colon with no polyps, tumors, growths, diverticula or other mucosal changes to terminal ileum    Recommendations:   Repeat colonoscopy recommended on a screening basis in 10 years  If no improvement in his anemia would recommend either GI consultation for consideration of small bowel capsule endoscopy and/or hematology consult    Description of procedure:    After obtaining informed consent, patient was brought to the endoscopy suite and was sedated.  The flexible gastroscope was introduced through the patient's mouth, passed the cricopharyngeus into the esophagus, beyond the GE junction into the stomach and then past the pylorus and into the duodenum.  The junction of the 2nd and 3rd portion of the duodenum was reached.  The major papilla was normal.  The duodenal bulb and pyloric channel were normal.  The scope was pulled back into the stomach which was unremarkable as was the remainder of the esophagus on withdrawal of the scope.  Retroflexion of the scope was unremarkable in the stomach.  Scope was withdrawn completely.    The patient  was turned around. Digital rectal exam was undertaken and was unremarkable.  The flexible colonoscope was then introduced through the rectum and passed around to the level of the cecum under direct visualization.  External pressure was required to gain access to the cecum.  The ileocecal  valve and appendiceal orifice appeared normal.  The terminal ileum was intubated and the visualized ileal mucosa was normal.  Scope was pulled back into the cecum which was unremarkable as was the remainder of the ascending colon, hepatic flexure, transverse colon, splenic flexure, descending colon, sigmoid colon and rectum.  There were no mass lesions, strictures, diverticula or other findings identified.  The scope was withdrawn completely.    Vitaly iFelds MD  General and Endoscopic Surgery  [facility]    [address], [address]  [place], [place], [address]  P: [phone]  F: [phone]

## 2025-07-22 NOTE — DISCHARGE INSTRUCTIONS
For the rest of the day patient needs to be with a responsible adult.    For the rest of the day DO NOT work, drive, operate heavy machinery, drink alcohol, make important decisions or sign legal documents.    Advance to regular diet as tolerated, if your stomach is upset start with a light/bland diet.    Follow recommendations on procedure report if provided by your doctor.    Call Dr Fields for problems [phone]    If these problems occur come to ER: large amounts of bleeding, trouble breathing, repeated vomiting, severe unrelieved pain, fever or chills.

## 2025-08-06 ENCOUNTER — OFFICE VISIT (OUTPATIENT)
Dept: FAMILY MEDICINE CLINIC | Facility: CLINIC | Age: 50
End: 2025-08-06
Payer: COMMERCIAL

## 2025-08-06 VITALS
BODY MASS INDEX: 23.64 KG/M2 | HEIGHT: 68 IN | TEMPERATURE: 97.2 F | DIASTOLIC BLOOD PRESSURE: 68 MMHG | WEIGHT: 156 LBS | OXYGEN SATURATION: 97 % | HEART RATE: 54 BPM | RESPIRATION RATE: 16 BRPM | SYSTOLIC BLOOD PRESSURE: 104 MMHG

## 2025-08-06 DIAGNOSIS — G43.109 MIGRAINE WITH AURA AND WITHOUT STATUS MIGRAINOSUS, NOT INTRACTABLE: Primary | ICD-10-CM

## 2025-08-06 DIAGNOSIS — E78.2 HYPERLIPIDEMIA, MIXED: ICD-10-CM

## 2025-08-06 DIAGNOSIS — F51.01 IDIOPATHIC INSOMNIA: ICD-10-CM

## 2025-08-06 DIAGNOSIS — E61.1 LOW IRON: ICD-10-CM

## 2025-08-06 DIAGNOSIS — E55.9 VITAMIN D DEFICIENCY: ICD-10-CM

## 2025-08-06 DIAGNOSIS — K21.9 GASTROESOPHAGEAL REFLUX DISEASE WITHOUT ESOPHAGITIS: ICD-10-CM

## 2025-08-06 DIAGNOSIS — F17.201 TOBACCO ABUSE, IN REMISSION: ICD-10-CM

## 2025-08-06 DIAGNOSIS — D22.9 ATYPICAL NEVI: ICD-10-CM

## 2025-08-06 DIAGNOSIS — F51.01 PRIMARY INSOMNIA: ICD-10-CM

## 2025-08-06 DIAGNOSIS — I47.10 PSVT (PAROXYSMAL SUPRAVENTRICULAR TACHYCARDIA): ICD-10-CM

## 2025-08-06 DIAGNOSIS — R73.01 IMPAIRED FASTING GLUCOSE: ICD-10-CM

## 2025-08-06 PROCEDURE — 99214 OFFICE O/P EST MOD 30 MIN: CPT | Performed by: PHYSICIAN ASSISTANT

## 2025-08-06 RX ORDER — AZITHROMYCIN 250 MG/1
TABLET, FILM COATED ORAL
Qty: 6 TABLET | Refills: 1 | Status: SHIPPED | OUTPATIENT
Start: 2025-08-06

## 2025-08-06 RX ORDER — ERENUMAB-AOOE 140 MG/ML
1 INJECTION, SOLUTION SUBCUTANEOUS
Qty: 1 ML | Refills: 12 | Status: SHIPPED | OUTPATIENT
Start: 2025-08-06 | End: 2025-08-06 | Stop reason: SDUPTHER

## 2025-08-06 RX ORDER — ERENUMAB-AOOE 140 MG/ML
1 INJECTION, SOLUTION SUBCUTANEOUS
Qty: 1 ML | Refills: 12 | Status: SHIPPED | OUTPATIENT
Start: 2025-08-06

## 2025-08-06 RX ORDER — ZOLPIDEM TARTRATE 12.5 MG/1
12.5 TABLET, FILM COATED, EXTENDED RELEASE ORAL NIGHTLY PRN
Qty: 30 TABLET | Refills: 2 | Status: SHIPPED | OUTPATIENT
Start: 2025-08-06

## 2025-08-07 LAB
25(OH)D3+25(OH)D2 SERPL-MCNC: 32.4 NG/ML (ref 30–100)
BASOPHILS # BLD AUTO: 0.06 10*3/MM3 (ref 0–0.2)
BASOPHILS NFR BLD AUTO: 0.8 % (ref 0–1.5)
BUN SERPL-MCNC: 11 MG/DL (ref 6–20)
BUN/CREAT SERPL: 11.2 (ref 7–25)
CALCIUM SERPL-MCNC: 9.6 MG/DL (ref 8.6–10.5)
CHLORIDE SERPL-SCNC: 103 MMOL/L (ref 98–107)
CO2 SERPL-SCNC: 23 MMOL/L (ref 22–29)
CREAT SERPL-MCNC: 0.98 MG/DL (ref 0.76–1.27)
EGFRCR SERPLBLD CKD-EPI 2021: 93.4 ML/MIN/1.73
EOSINOPHIL # BLD AUTO: 0.29 10*3/MM3 (ref 0–0.4)
EOSINOPHIL NFR BLD AUTO: 3.8 % (ref 0.3–6.2)
ERYTHROCYTE [DISTWIDTH] IN BLOOD BY AUTOMATED COUNT: 12.8 % (ref 12.3–15.4)
FERRITIN SERPL-MCNC: 402 NG/ML (ref 30–400)
GLUCOSE SERPL-MCNC: 78 MG/DL (ref 65–99)
HBA1C MFR BLD: 5.4 % (ref 4.8–5.6)
HCT VFR BLD AUTO: 42.1 % (ref 37.5–51)
HGB BLD-MCNC: 13.9 G/DL (ref 13–17.7)
IMM GRANULOCYTES # BLD AUTO: 0.03 10*3/MM3 (ref 0–0.05)
IMM GRANULOCYTES NFR BLD AUTO: 0.4 % (ref 0–0.5)
IRON SATN MFR SERPL: 25 % (ref 20–50)
IRON SERPL-MCNC: 87 MCG/DL (ref 59–158)
LYMPHOCYTES # BLD AUTO: 1.98 10*3/MM3 (ref 0.7–3.1)
LYMPHOCYTES NFR BLD AUTO: 26.2 % (ref 19.6–45.3)
MCH RBC QN AUTO: 30.2 PG (ref 26.6–33)
MCHC RBC AUTO-ENTMCNC: 33 G/DL (ref 31.5–35.7)
MCV RBC AUTO: 91.5 FL (ref 79–97)
MONOCYTES # BLD AUTO: 0.72 10*3/MM3 (ref 0.1–0.9)
MONOCYTES NFR BLD AUTO: 9.5 % (ref 5–12)
NEUTROPHILS # BLD AUTO: 4.47 10*3/MM3 (ref 1.7–7)
NEUTROPHILS NFR BLD AUTO: 59.3 % (ref 42.7–76)
PLATELET # BLD AUTO: 161 10*3/MM3 (ref 140–450)
POTASSIUM SERPL-SCNC: 4.5 MMOL/L (ref 3.5–5.2)
RBC # BLD AUTO: 4.6 10*6/MM3 (ref 4.14–5.8)
SODIUM SERPL-SCNC: 138 MMOL/L (ref 136–145)
TIBC SERPL-MCNC: 352 MCG/DL
UIBC SERPL-MCNC: 265 MCG/DL (ref 112–346)
WBC # BLD AUTO: 7.55 10*3/MM3 (ref 3.4–10.8)
WRITTEN AUTHORIZATION: NORMAL

## (undated) DEVICE — ADAPT CLN BIOGUARD AIR/H2O DISP

## (undated) DEVICE — MSK PROC CURAPLEX O2 2/ADAPT 7FT

## (undated) DEVICE — DRSNG WND GZ CURAD OIL EMULSION 3X3IN STRL

## (undated) DEVICE — LN SMPL CO2 SHTRM SD STREAM W/M LUER

## (undated) DEVICE — SKIN PREP TRAY W/CHG: Brand: MEDLINE INDUSTRIES, INC.

## (undated) DEVICE — BLCK/BITE BLOX W/DENTL/RIM W/STRAP 54F

## (undated) DEVICE — KT ORCA ORCAPOD DISP STRL

## (undated) DEVICE — TUBING, SUCTION, 1/4" X 10', STRAIGHT: Brand: MEDLINE

## (undated) DEVICE — PK ARTHSCP SHLDR TOWER 40

## (undated) DEVICE — TBG ARTHSCP PT W CONN/REDUC 8FT

## (undated) DEVICE — KT POSTN ARM TRIMANO BEACH CHR W/DRP

## (undated) DEVICE — DRAPE,SHOULDER,BEACH ULTRAGARD: Brand: MEDLINE

## (undated) DEVICE — ARM SLING: Brand: DEROYAL

## (undated) DEVICE — BLD SHAVER BONECUTTER 5MM 13CM

## (undated) DEVICE — ABL ASP APOLLO RF XL 90D

## (undated) DEVICE — GLV SURG BIOGEL LTX PF 6 1/2

## (undated) DEVICE — PATIENT RETURN ELECTRODE, SINGLE-USE, CONTACT QUALITY MONITORING, ADULT, WITH 9FT CORD, FOR PATIENTS WEIGING OVER 33LBS. (15KG): Brand: MEGADYNE

## (undated) DEVICE — SENSR O2 OXIMAX FNGR A/ 18IN NONSTR